# Patient Record
Sex: MALE | Race: WHITE | NOT HISPANIC OR LATINO | Employment: OTHER | URBAN - METROPOLITAN AREA
[De-identification: names, ages, dates, MRNs, and addresses within clinical notes are randomized per-mention and may not be internally consistent; named-entity substitution may affect disease eponyms.]

---

## 2017-01-27 ENCOUNTER — TELEPHONE (OUTPATIENT)
Dept: GASTROENTEROLOGY | Facility: CLINIC | Age: 32
End: 2017-01-27

## 2017-02-07 ENCOUNTER — HOSPITAL ENCOUNTER (OUTPATIENT)
Dept: RADIOLOGY | Facility: HOSPITAL | Age: 32
Discharge: HOME | End: 2017-02-07
Attending: FAMILY MEDICINE
Payer: COMMERCIAL

## 2017-02-07 ENCOUNTER — OFFICE VISIT (OUTPATIENT)
Dept: FAMILY MEDICINE | Facility: CLINIC | Age: 32
End: 2017-02-07
Payer: COMMERCIAL

## 2017-02-07 VITALS
BODY MASS INDEX: 30.52 KG/M2 | SYSTOLIC BLOOD PRESSURE: 118 MMHG | HEART RATE: 112 BPM | TEMPERATURE: 99 F | DIASTOLIC BLOOD PRESSURE: 78 MMHG | HEIGHT: 72 IN | WEIGHT: 225.31 LBS

## 2017-02-07 DIAGNOSIS — Z90.49 STATUS POST CHOLECYSTECTOMY: ICD-10-CM

## 2017-02-07 DIAGNOSIS — N39.0 URINARY TRACT INFECTION WITHOUT HEMATURIA, SITE UNSPECIFIED: ICD-10-CM

## 2017-02-07 DIAGNOSIS — R11.0 NAUSEA: ICD-10-CM

## 2017-02-07 DIAGNOSIS — H66.90 OTITIS MEDIA, UNSPECIFIED CHRONICITY, UNSPECIFIED LATERALITY, UNSPECIFIED OTITIS MEDIA TYPE: ICD-10-CM

## 2017-02-07 DIAGNOSIS — L84 CALLUS: ICD-10-CM

## 2017-02-07 DIAGNOSIS — R07.9 CHEST PAIN, UNSPECIFIED TYPE: ICD-10-CM

## 2017-02-07 DIAGNOSIS — Z83.3 FAMILY HISTORY OF DIABETES MELLITUS: ICD-10-CM

## 2017-02-07 DIAGNOSIS — R50.9 FEVER, UNSPECIFIED FEVER CAUSE: Primary | ICD-10-CM

## 2017-02-07 LAB — GLUCOSE SERPL-MCNC: 99 MG/DL (ref 70–110)

## 2017-02-07 PROCEDURE — 93005 ELECTROCARDIOGRAM TRACING: CPT | Mod: S$GLB,,, | Performed by: FAMILY MEDICINE

## 2017-02-07 PROCEDURE — 99000 SPECIMEN HANDLING OFFICE-LAB: CPT | Mod: S$GLB,,, | Performed by: FAMILY MEDICINE

## 2017-02-07 PROCEDURE — 82948 REAGENT STRIP/BLOOD GLUCOSE: CPT | Mod: S$GLB,,, | Performed by: FAMILY MEDICINE

## 2017-02-07 PROCEDURE — 36416 COLLJ CAPILLARY BLOOD SPEC: CPT | Mod: 59,S$GLB,, | Performed by: FAMILY MEDICINE

## 2017-02-07 PROCEDURE — 96372 THER/PROPH/DIAG INJ SC/IM: CPT | Mod: S$GLB,,, | Performed by: FAMILY MEDICINE

## 2017-02-07 PROCEDURE — 93010 ELECTROCARDIOGRAM REPORT: CPT | Mod: S$GLB,,, | Performed by: INTERNAL MEDICINE

## 2017-02-07 PROCEDURE — 99204 OFFICE O/P NEW MOD 45 MIN: CPT | Mod: 25,S$GLB,, | Performed by: FAMILY MEDICINE

## 2017-02-07 PROCEDURE — 71020 XR CHEST PA AND LATERAL: CPT | Mod: TC,PO

## 2017-02-07 PROCEDURE — 71020 XR CHEST PA AND LATERAL: CPT | Mod: 26,,, | Performed by: RADIOLOGY

## 2017-02-07 PROCEDURE — 99999 PR PBB SHADOW E&M-EST. PATIENT-LVL IV: CPT | Mod: PBBFAC,,, | Performed by: FAMILY MEDICINE

## 2017-02-07 PROCEDURE — 87086 URINE CULTURE/COLONY COUNT: CPT

## 2017-02-07 RX ORDER — CEFTRIAXONE 250 MG/1
250 INJECTION, POWDER, FOR SOLUTION INTRAMUSCULAR; INTRAVENOUS ONCE
Status: COMPLETED | OUTPATIENT
Start: 2017-02-07 | End: 2017-02-07

## 2017-02-07 RX ORDER — CEPHALEXIN 500 MG/1
500 CAPSULE ORAL 4 TIMES DAILY
Qty: 40 CAPSULE | Refills: 0 | Status: SHIPPED | OUTPATIENT
Start: 2017-02-07 | End: 2017-02-11

## 2017-02-07 RX ORDER — ONDANSETRON 8 MG/1
8 TABLET, ORALLY DISINTEGRATING ORAL EVERY 12 HOURS PRN
Qty: 12 TABLET | Refills: 2 | Status: SHIPPED | OUTPATIENT
Start: 2017-02-07 | End: 2017-03-01

## 2017-02-07 RX ORDER — LIDOCAINE HYDROCHLORIDE 10 MG/ML
1 INJECTION, SOLUTION EPIDURAL; INFILTRATION; INTRACAUDAL; PERINEURAL
Status: COMPLETED | OUTPATIENT
Start: 2017-02-07 | End: 2017-02-07

## 2017-02-07 RX ADMIN — LIDOCAINE HYDROCHLORIDE 10 MG: 10 INJECTION, SOLUTION EPIDURAL; INFILTRATION; INTRACAUDAL; PERINEURAL at 09:02

## 2017-02-07 RX ADMIN — CEFTRIAXONE 250 MG: 250 INJECTION, POWDER, FOR SOLUTION INTRAMUSCULAR; INTRAVENOUS at 09:02

## 2017-02-07 NOTE — PROGRESS NOTES
Nguyễn Gale Jr. is a 31 y.o. male     Chief Complaint:  Patient is suffering from fevers sweating ear pain and abdominal pain on and off over the last 3-4 weeks.  Source of history: Patient  Past Medical History   Diagnosis Date    Anxiety     GERD (gastroesophageal reflux disease)     Tobacco use     patient mentioned that he had a cholecystectomy done over a year ago and still had some pain and was supposed to go back to have a duct explored for possible trapped stones.  Patient admits that he never went to have this done and is concerned that maybe some of the pain is feeling now might be related to a trapped stones in a residual bile duct  Patient  reports that he has been smoking Cigarettes.  He has a 5.00 pack-year smoking history. He does not have any smokeless tobacco history on file. He reports that he drinks alcohol. He reports that he uses illicit drugs, including Marijuana, about 7 times per week.  Family History   Problem Relation Age of Onset    No Known Problems Mother     Peripheral vascular disease Father     Diabetes Father     Heart disease Father     Hypertension Father     Hyperlipidemia Father     Cirrhosis Maternal Grandfather     Liver disease Maternal Grandfather     Heart disease Paternal Grandfather     Diabetes Paternal Grandfather     Hypertension Paternal Grandfather     Hyperlipidemia Paternal Grandfather     Colon cancer Paternal Grandfather     Colon polyps Paternal Grandfather     Glaucoma Maternal Grandmother     Amblyopia Neg Hx     Blindness Neg Hx     Cataracts Neg Hx     Macular degeneration Neg Hx     Retinal detachment Neg Hx     Strabismus Neg Hx     Celiac disease Neg Hx     Crohn's disease Neg Hx     Esophageal cancer Neg Hx     Stomach cancer Neg Hx     Ulcerative colitis Neg Hx      ROS:                                                                                GENERAL: No fever, chills, fatigability or weight loss. .  SKIN: No  rashes, itching or changes in color or texture of skin.  HEAD: No headaches or recent head trauma.  EYES: Visual acuity fine. No photophobia, ocular pain or diplopia.  EARS: Denies ear pain, discharge or vertigo.  Patient is been experiencing dizziness on and off over the last few days  NOSE: No loss of smell, no epistaxis or postnasal drip.  MOUTH & THROAT: No hoarseness or change in voice. No excessive gum bleeding.  NODES: Denies swollen glands.  CHEST: Denies GARZA, cyanosis, wheezing, cough and sputum production.  CARDIOVASCULAR: Denies chest pain, PND, orthopnea or reduced exercise tolerance.  ABDOMEN: Appetite fine. No weight loss. Denies diarrhea, abdominal pain, hematemesis or blood in stool.  URINARY: No flank pain, dysuria or hematuria. Frequency of urination has developed over the last few days.  Patient denies any blood or penile discharge.  PERIPHERAL VASCULAR: No claudication or cyanosis.  MUSCULOSKELETAL: No complaints  NEUROLOGIC: No history of seizures, paralysis, alteration of gait or coordination.    OBJECTIVE:  APPEARANCE: Well nourished, well developed, in no acute distress.   VS:  see nurses notes 2/7/2017  SKIN: No lesions, good turgor, no rashes.   HEENT: Right TM erythematous swollen and bulging, left TM slightly pink, ear canals clear bilaterally nasal mucosa injected maxillary sinuses mildly tender percussion throughout mild postnasal drip  NECK: Supple nontender, no carotid bruits, no thyromegaly.  NODES: Normal.  CHEST: Clear bilaterally, with good respiratory excursion, no evidence of respiratory distress.  CARDIOVASCULAR: S1, S2, regular rate and rhythm without murmur.  ABDOMEN: Soft nontender no hepatosplenomegaly, no guarding or rebound, no pulsatile mass.  PERIPHERAL VASCULAR: Distal pulses palpable throughout, normal capillary refill in all distal extremities, no edema.  MUSCULOSKELETAL: No abnormalities noted.  BACK: No scoliosis, no spasm, cervical, thoracic, lumbar spine  nontender to palpation  NEUROLOGIC: Cranial nerves II through XII grossly intact, motor exam 5 out of 5 on distal extremities, no gait disturbances, sensation intact in all distal extremities  MENTAL STATUS: Patient oriented x3, normal affect, normal mood    ASSESSMENT/PLAN:   Nguyễn was seen today for fever, gi problem and constant sweating.    Diagnoses and all orders for this visit:    Fever, unspecified fever cause  -     CBC auto differential; Future  -     TSH; Future  -     Lipid panel; Future  -     Comprehensive metabolic panel; Future  -     Hemoglobin A1c; Future    Chest pain, unspecified type  -     X-Ray Chest PA And Lateral; Future  -     IN OFFICE EKG 12-LEAD (to Muse)    Urinary tract infection without hematuria, site unspecified  -     cephALEXin (KEFLEX) 500 MG capsule; Take 1 capsule (500 mg total) by mouth 4 (four) times daily.  -     cefTRIAXone injection 250 mg; Inject 250 mg into the muscle once.  -     Urine culture  -     lidocaine (PF) 10 mg/ml (1%) injection 10 mg; 1 mL (10 mg total) by Other route one time.    Otitis media, unspecified chronicity, unspecified laterality, unspecified otitis media type  -     cephALEXin (KEFLEX) 500 MG capsule; Take 1 capsule (500 mg total) by mouth 4 (four) times daily.  -     cefTRIAXone injection 250 mg; Inject 250 mg into the muscle once.  -     lidocaine (PF) 10 mg/ml (1%) injection 10 mg; 1 mL (10 mg total) by Other route one time.    Nausea  -     ondansetron (ZOFRAN-ODT) 8 MG TbDL; Take 1 tablet (8 mg total) by mouth every 12 (twelve) hours as needed.    Status post cholecystectomy  -     Ambulatory consult to Gastroenterology    Family history of diabetes mellitus  -     POCT glucose    Callus  -     Ambulatory consult to Podiatry       we'll contact the patient results of additional testing are available we'll make further determinations at that time.

## 2017-02-07 NOTE — PROGRESS NOTES
Ceftriaxone 250mg given to left upper outer quad gluteus, per MD's orders, patient tolerated well, advise patient to wait 15min after shot is given for any adverse reaction.

## 2017-02-07 NOTE — MR AVS SNAPSHOT
[facility]  [address] [facility], [address]  [facility] [address]  Phone: [phone]  Fax: [phone]                  Nguyễn Gale Jr.   2017 8:00 AM   Office Visit    Description:  Male : 1985   Provider:  Rosa Clinton MD   Department:  [facility]           Reason for Visit     Fever     GI Problem     constant sweating           Diagnoses this Visit        Comments    Fever, unspecified fever cause    -  Primary     Chest pain, unspecified type         Urinary tract infection without hematuria, site unspecified         Otitis media, unspecified chronicity, unspecified laterality, unspecified otitis media type         Nausea         Status post cholecystectomy         Family history of diabetes mellitus                To Do List           Goals (5 Years of Data)     None       These Medications        Disp Refills Start End    cephALEXin (KEFLEX) 500 MG capsule 40 capsule 0 2017    Take 1 capsule (500 mg total) by mouth 4 (four) times daily. - Oral    Pharmacy: [facility] [address] [facility] Ph #: [phone]       ondansetron (ZOFRAN-ODT) 8 MG TbDL 12 tablet 2 2017     Take 1 tablet (8 mg total) by mouth every 12 (twelve) hours as needed. - Oral    Pharmacy: [facility] [address] AT [facility] Ph #: [phone]         [facility] On Call     [facility] On [facility] Nurse Care Line -  Assistance  Registered nurses in the [facility] Center provide clinical advisement, health education, appointment booking, and other advisory services.  Call for this free service at [phone].             Medications           Message regarding Medications     Verify the changes and/or additions to your medication regime listed below are the same as discussed with your  clinician today.  If any of these changes or additions are incorrect, please notify your healthcare provider.        START taking these NEW medications        Refills    cephALEXin (KEFLEX) 500 MG capsule 0    Sig: Take 1 capsule (500 mg total) by mouth 4 (four) times daily.    Class: Normal    Route: Oral    ondansetron (ZOFRAN-ODT) 8 MG TbDL 2    Sig: Take 1 tablet (8 mg total) by mouth every 12 (twelve) hours as needed.    Class: Normal    Route: Oral      These medications were administered today        Dose Freq    cefTRIAXone injection 250 mg 250 mg Once    Sig: Inject 250 mg into the muscle once.    Class: Normal    Route: Intramuscular           Verify that the below list of medications is an accurate representation of the medications you are currently taking.  If none reported, the list may be blank. If incorrect, please contact your healthcare provider. Carry this list with you in case of emergency.           Current Medications     bacitracin 500 unit/gram Oint Apply topically 2 (two) times daily.    levocetirizine (XYZAL) 5 MG tablet Take 1 tablet (5 mg total) by mouth every evening.    cephALEXin (KEFLEX) 500 MG capsule Take 1 capsule (500 mg total) by mouth 4 (four) times daily.    ondansetron (ZOFRAN-ODT) 8 MG TbDL Take 1 tablet (8 mg total) by mouth every 12 (twelve) hours as needed.           Clinical Reference Information           Your Vitals Were     BP Pulse Temp Height Weight BMI    118/78 (BP Location: Left arm) 112 99.3 °F (37.4 °C) 6' (1.829 m) 102.2 kg (225 lb 5 oz) 30.56 kg/m2      Blood Pressure          Most Recent Value    BP  118/78      Allergies as of 2/7/2017     No Known Allergies      Immunizations Administered on Date of Encounter - 2/7/2017     None      Orders Placed During Today's Visit      Normal Orders This Visit    Ambulatory consult to Gastroenterology     IN OFFICE EKG 12-LEAD (to Muse)     POCT glucose     Urine culture     Future Labs/Procedures Expected by Expires     CBC auto differential  2/7/2017 4/8/2018    Comprehensive metabolic panel  2/7/2017 4/8/2018    Hemoglobin A1c  2/7/2017 4/8/2018    Lipid panel  2/7/2017 4/8/2018    TSH  2/7/2017 4/8/2018    X-Ray Chest PA And Lateral  2/7/2017 2/7/2018 2/7/2017  9:03 AM - Norma Sen LPN      Component Results     Component Value Flag Ref Range Units Status    POC Glucose 99  70 - 110 mg/dL Final            [facility] Sign-Up     Activating your [facility] account is as easy as 1-2-3!     1) Visit [email or web address], select Sign Up Now, enter this activation code and your date of birth, then select Next.  [email or web address]  Expires: 2/13/2017  4:05 PM      2) Create a username and password to use when you visit [facility] in the future and select a security question in case you lose your password and select Next.    3) Enter your e-mail address and click Sign Up!    Additional Information  If you have questions, please e-mail [email or web address] or call [phone] to talk to our [facility] staff. Remember, [facility] is NOT to be used for urgent needs. For medical emergencies, dial 911.         Smoking Cessation     If you would like to quit smoking:   You may be eligible for free services if you are a [place] resident and started smoking cigarettes before September 1, 1988.  Call the Smoking Cessation Trust ([facility]) toll free at (513) 784-3390 or (647) 883-6262.   Call 5-190-QUIT-NOW if you do not meet the above criteria.            Language Assistance Services     ATTENTION: Language assistance services are available, free of charge. Please call [phone].      ATENCIÓN: Si habla español, tiene a samson disposición servicios gratuitos de asistencia lingüística. Llame al [phone].     CHÚ Ý: N?u b?n nói Ti?ng Vi?t, có các d?ch v? h? tr? ngôn ng? mi?n phí dành cho b?n. G?i s? [phone].         [facility] complies with applicable Federal civil rights laws and does not discriminate on the  basis of race, color, national origin, age, disability, or sex.

## 2017-02-08 LAB — BACTERIA UR CULT: NO GROWTH

## 2017-02-09 ENCOUNTER — OFFICE VISIT (OUTPATIENT)
Dept: GASTROENTEROLOGY | Facility: CLINIC | Age: 32
End: 2017-02-09
Payer: COMMERCIAL

## 2017-02-09 VITALS
WEIGHT: 226.44 LBS | HEIGHT: 72 IN | HEART RATE: 111 BPM | DIASTOLIC BLOOD PRESSURE: 77 MMHG | BODY MASS INDEX: 30.67 KG/M2 | SYSTOLIC BLOOD PRESSURE: 131 MMHG

## 2017-02-09 DIAGNOSIS — R10.9 ABDOMINAL PAIN, UNSPECIFIED LOCATION: ICD-10-CM

## 2017-02-09 DIAGNOSIS — R79.89 LFT ELEVATION: Primary | ICD-10-CM

## 2017-02-09 PROCEDURE — 99214 OFFICE O/P EST MOD 30 MIN: CPT | Mod: S$GLB,,, | Performed by: INTERNAL MEDICINE

## 2017-02-09 PROCEDURE — 99999 PR PBB SHADOW E&M-EST. PATIENT-LVL III: CPT | Mod: PBBFAC,,, | Performed by: INTERNAL MEDICINE

## 2017-02-09 PROCEDURE — 1160F RVW MEDS BY RX/DR IN RCRD: CPT | Mod: S$GLB,,, | Performed by: INTERNAL MEDICINE

## 2017-02-09 RX ORDER — DICYCLOMINE HYDROCHLORIDE 20 MG/1
20 TABLET ORAL EVERY 6 HOURS
COMMUNITY
End: 2017-07-06 | Stop reason: ALTCHOICE

## 2017-02-09 NOTE — LETTER
February 13, 2017      Rosa Clinton MD  [address] [facility]  [address]  [facility] [address]           [facility]  [address] [facility]  [address]  [facility] [address]  Phone: [phone]          Patient: Nguyễn Gale Jr.   MR Number: [id number]   YOB: 1985   Date of Visit: 2/9/2017       Dear Dr. Rosa Clinton:    Thank you for referring Nguyễn Gale to me for evaluation. Attached you will find relevant portions of my assessment and plan of care.    If you have questions, please do not hesitate to call me. I look forward to following Nguyễn Gale along with you.    Sincerely,    Drake Sanderson MD    Enclosure  CC:  No Recipients    If you would like to receive this communication electronically, please contact [email or web address] or [phone] to request more information on [employer] access.    For providers and/or their staff who would like to refer a patient to [facility], please contact us through our one-stop-shop provider referral line, [facility], [place], at [phone].    If you feel you have received this communication in error or would no longer like to receive these types of communications, please e-mail [email or web address]

## 2017-02-09 NOTE — PROGRESS NOTES
Subjective:       Patient ID: Nguyễn Gale Jr. is a 31 y.o. male.    Chief Complaint: Abdominal Pain and Gastroesophageal Reflux    This is a 32yo male here for evaluation of abdominal pain.      Abdominal Pain   This is a recurrent problem. The current episode started more than 1 month ago. The onset quality is sudden. The problem occurs intermittently. The problem has been unchanged. The pain is located in the RUQ (feels similar to pain with prior cholelithiasis). The pain is at a severity of 5/10. The pain is moderate. The quality of the pain is colicky. The abdominal pain does not radiate. Pertinent negatives include no constipation, diarrhea, fever, hematuria, nausea or vomiting. Nothing aggravates the pain. The pain is relieved by nothing. He has tried nothing for the symptoms. The treatment provided no relief. Prior diagnostic workup includes GI consult. His past medical history is significant for abdominal surgery. There is no history of PUD.     Review of Systems   Constitutional: Negative for chills and fever.   HENT: Negative for postnasal drip and trouble swallowing.    Eyes: Negative for pain and visual disturbance.   Respiratory: Negative for cough, choking and shortness of breath.    Cardiovascular: Negative for chest pain and leg swelling.   Gastrointestinal: Negative for abdominal distention, abdominal pain, anal bleeding, blood in stool, constipation, diarrhea, nausea, rectal pain and vomiting.   Endocrine: Negative for cold intolerance and heat intolerance.   Genitourinary: Negative for difficulty urinating and hematuria.   Neurological: Negative for dizziness and numbness.   Hematological: Negative for adenopathy. Does not bruise/bleed easily.   Psychiatric/Behavioral: Negative for agitation and confusion.       Objective:      Physical Exam   Constitutional: He is oriented to person, place, and time. He appears well-developed and well-nourished. No distress.   HENT:   Head: Normocephalic.    Eyes: Conjunctivae are normal. No scleral icterus.   Neck: No tracheal deviation present. No thyromegaly present.   Cardiovascular: Normal rate, regular rhythm and normal heart sounds.  Exam reveals no gallop and no friction rub.    No murmur heard.  Pulmonary/Chest: Effort normal and breath sounds normal. He has no wheezes. He has no rales.   Abdominal: Soft. Bowel sounds are normal. He exhibits no distension. There is no tenderness. There is no rebound and no guarding.   Musculoskeletal: Normal range of motion. He exhibits no edema or tenderness.   Neurological: He is alert and oriented to person, place, and time.   Skin: He is not diaphoretic.   Psychiatric: He has a normal mood and affect. His behavior is normal.       Labs:   Assessment:       1. LFT elevation    2. Abdominal pain, unspecified location        Plan:   1. MRCP  2. Continue Bentyl

## 2017-02-11 ENCOUNTER — OFFICE VISIT (OUTPATIENT)
Dept: INTERNAL MEDICINE | Facility: CLINIC | Age: 32
End: 2017-02-11
Payer: COMMERCIAL

## 2017-02-11 VITALS
WEIGHT: 226.88 LBS | DIASTOLIC BLOOD PRESSURE: 83 MMHG | HEIGHT: 72 IN | TEMPERATURE: 98 F | SYSTOLIC BLOOD PRESSURE: 151 MMHG | BODY MASS INDEX: 30.73 KG/M2

## 2017-02-11 DIAGNOSIS — L03.315 CELLULITIS OF PERINEUM: Primary | ICD-10-CM

## 2017-02-11 PROCEDURE — 99214 OFFICE O/P EST MOD 30 MIN: CPT | Mod: S$GLB,,, | Performed by: FAMILY MEDICINE

## 2017-02-11 PROCEDURE — 99999 PR PBB SHADOW E&M-EST. PATIENT-LVL III: CPT | Mod: PBBFAC,,, | Performed by: FAMILY MEDICINE

## 2017-02-11 RX ORDER — METHYLPREDNISOLONE 4 MG/1
TABLET ORAL
Qty: 30 TABLET | Refills: 0 | Status: SHIPPED | OUTPATIENT
Start: 2017-02-11 | End: 2017-03-01 | Stop reason: ALTCHOICE

## 2017-02-11 RX ORDER — SULFAMETHOXAZOLE AND TRIMETHOPRIM 800; 160 MG/1; MG/1
1 TABLET ORAL 2 TIMES DAILY
Qty: 20 TABLET | Refills: 0 | Status: SHIPPED | OUTPATIENT
Start: 2017-02-11 | End: 2017-02-16 | Stop reason: ALTCHOICE

## 2017-02-11 NOTE — PROGRESS NOTES
Subjective:       Patient ID: Nguyễn Gale Jr. is a 31 y.o. male.    Chief Complaint: Penis Pain (no pain red and swollen with rash)    HPI Comments: Disclaimer: This note has been generated using voice-recognition software. There may be typographical errors that have been missed during proof-reading    Vision is 31-year-old who was previously seen for a possible UTI and ear infection, and is presently taking Keflex 500 mg 3 times a day.  He developed a pruritic rash several days ago, and states that his girlfriend has a similar rash, which is previously treated as scabies.  He had noted the lesions over the genital area, and over the last 24-48 hours, noted the onset of swelling, redness along with pain and itching over the genital area.  No penile discharge.  No vesicular lesions noted by the patient.    Penis Pain   The patient's primary symptoms include penile pain and scrotal swelling. The patient's pertinent negatives include no testicular pain. Associated symptoms include a rash.     Review of Systems   Genitourinary: Positive for penile pain, penile swelling and scrotal swelling. Negative for decreased urine volume, difficulty urinating and testicular pain.   Skin: Positive for color change and rash.       Objective:      Physical Exam   Constitutional: He appears well-developed and well-nourished. No distress.   HENT:   Right Ear: Tympanic membrane and ear canal normal.   Left Ear: Tympanic membrane and ear canal normal.   Mouth/Throat: Uvula is midline, oropharynx is clear and moist and mucous membranes are normal. No posterior oropharyngeal erythema.   Genitourinary:   Genitourinary Comments: Confluent, erythematous rash involving penis and scrotal area, no vesicular lesions   Skin:   Several lesions, slightly raised, erythematous, in linear fashion over upper, lower body       Assessment:       1. Cellulitis of perineum        Plan:       1.  Stop Keflex, start Bactrim  2.  Medrol dosepak  3.  F/u  2-3 days

## 2017-02-11 NOTE — MR AVS SNAPSHOT
[facility]   [facility]  [place] [address]  Phone: [phone]  Fax: [phone]                  Nguyễn Gale Jr.   2017 8:20 AM   Office Visit    Description:  Male : 1985   Provider:  Natan Murphy MD   Department:  [facility]           Reason for Visit     Penis Pain           Diagnoses this Visit        Comments    Cellulitis of perineum    -  Primary            To Do List           Future Appointments        Provider Department Dept Phone    2017 8:45 AM Adamaris Cohen DPM [facility] [phone]    2017 9:45 AM [facility] [phone]      Goals (5 Years of Data)     None       These Medications        Disp Refills Start End    sulfamethoxazole-trimethoprim 800-160mg (BACTRIM DS) 800-160 mg Tab 20 tablet 0 2017    Take 1 tablet by mouth 2 (two) times daily. - Oral    [facility] [address] [facility] [address] Ph #: [phone]       methylPREDNISolone (MEDROL DOSEPACK) 4 mg tablet 30 tablet 0 2017     Take as directed    [facility] [address] [facility] [address] Ph #: [phone]         [facility]     [facility] On [facility] Nurse Care Line -  Assistance  Registered nurses in the [facility] Center provide clinical advisement, health education, appointment booking, and other advisory services.  Call for this free service at [phone].             Medications           Message regarding Medications     Verify the changes and/or additions to your medication regime listed below are the same as discussed with your clinician today.  If any of these changes or additions are incorrect, please notify your healthcare provider.        START taking these NEW medications        Refills     sulfamethoxazole-trimethoprim 800-160mg (BACTRIM DS) 800-160 mg Tab 0    Sig: Take 1 tablet by mouth 2 (two) times daily.    Class: Normal    Route: Oral    methylPREDNISolone (MEDROL DOSEPACK) 4 mg tablet 0    Sig: Take as directed    Class: Normal      STOP taking these medications     cephALEXin (KEFLEX) 500 MG capsule Take 1 capsule (500 mg total) by mouth 4 (four) times daily.           Verify that the below list of medications is an accurate representation of the medications you are currently taking.  If none reported, the list may be blank. If incorrect, please contact your healthcare provider. Carry this list with you in case of emergency.           Current Medications     dicyclomine (BENTYL) 20 mg tablet Take 20 mg by mouth every 6 (six) hours.    ondansetron (ZOFRAN-ODT) 8 MG TbDL Take 1 tablet (8 mg total) by mouth every 12 (twelve) hours as needed.    methylPREDNISolone (MEDROL DOSEPACK) 4 mg tablet Take as directed    sulfamethoxazole-trimethoprim 800-160mg (BACTRIM DS) 800-160 mg Tab Take 1 tablet by mouth 2 (two) times daily.           Clinical Reference Information           Your Vitals Were     BP Temp Height Weight BMI    151/83 (BP Location: Right arm, Patient Position: Sitting, BP Method: Automatic) 98.3 °F (36.8 °C) (Oral) 6' (1.829 m) 102.9 kg (226 lb 13.7 oz) 30.77 kg/m2      Blood Pressure          Most Recent Value    BP  (!)  151/83      Allergies as of 2/11/2017     No Known Allergies      Immunizations Administered on Date of Encounter - 2/11/2017     None      [facility] Sign-Up     Activating your [facility] account is as easy as 1-2-3!     1) Visit [email or web address], select Sign Up Now, enter this activation code and your date of birth, then select Next.  [email or web address]  Expires: 2/13/2017  4:05 PM      2) Create a username and password to use when you visit [facility] in the future and select a security question in case you lose your password and select Next.    3) Enter your e-mail  address and click Sign Up!    Additional Information  If you have questions, please e-mail [email or web address] or call [phone] to talk to our [facility] staff. Remember, [facility] is NOT to be used for urgent needs. For medical emergencies, dial 911.         Instructions    Call in 2-3 days       Smoking Cessation     If you would like to quit smoking:   You may be eligible for free services if you are a [place] resident and started smoking cigarettes before September 1, 1988.  Call the Smoking Cessation Trust ([employer]) toll free at (206) 097-8822 or (589) 827-2926.   Call 6-688-QUIT-NOW if you do not meet the above criteria.            Language Assistance Services     ATTENTION: Language assistance services are available, free of charge. Please call [phone].      ATENCIÓN: Si josefina stephens, tiene a samson disposición servicios gratuitos de asistencia lingüística. Llame al [phone].     CHÚ Ý: N?u b?n nói Ti?ng Vi?t, có các d?ch v? h? tr? ngôn ng? mi?n phí dành cho b?n. G?i s? [phone].         [facility] complies with applicable Federal civil rights laws and does not discriminate on the basis of race, color, national origin, age, disability, or sex.

## 2017-02-16 ENCOUNTER — OFFICE VISIT (OUTPATIENT)
Dept: PODIATRY | Facility: CLINIC | Age: 32
End: 2017-02-16
Payer: COMMERCIAL

## 2017-02-16 VITALS
HEART RATE: 96 BPM | HEIGHT: 72 IN | SYSTOLIC BLOOD PRESSURE: 135 MMHG | BODY MASS INDEX: 30.07 KG/M2 | WEIGHT: 222 LBS | RESPIRATION RATE: 18 BRPM | DIASTOLIC BLOOD PRESSURE: 82 MMHG

## 2017-02-16 DIAGNOSIS — B07.0 PLANTAR WART: Primary | ICD-10-CM

## 2017-02-16 DIAGNOSIS — L85.1 PLANTAR POROKERATOSIS, ACQUIRED: ICD-10-CM

## 2017-02-16 PROCEDURE — 99999 PR PBB SHADOW E&M-EST. PATIENT-LVL III: CPT | Mod: PBBFAC,,, | Performed by: PODIATRIST

## 2017-02-16 PROCEDURE — 99204 OFFICE O/P NEW MOD 45 MIN: CPT | Mod: S$GLB,,, | Performed by: PODIATRIST

## 2017-02-16 NOTE — MR AVS SNAPSHOT
[facility]   [facility]  [place] [address]  Phone: [phone]                  Nguyễn Gale Jr.   2017 8:45 AM   Office Visit    Description:  Male : 1985   Provider:  Adamaris Cohen DPM   Department:  [facility]           Reason for Visit     PCP     Foot Problem     Callouses           Diagnoses this Visit        Comments    Plantar wart    -  Primary     Plantar porokeratosis, acquired                To Do List           Future Appointments        Provider Department Dept Phone    2017 9:45 AM [facility] [phone]    3/2/2017 8:45 AM Adamaris Cohen DPM [facility] [phone]      Goals (5 Years of Data)     None       These Medications        Disp Refills Start End    salicylic acid-lactic acid 17 % external solution 14 mL 1 2017     Apply topically once daily. Apply to the left heel daily, occlude with a small piece of duct tape.  Change daily. - Topical (Top)    Pharmacy: [facility] [id number]  [address] AT [facility] #: [phone]         [facility] On Call     [facility] On [facility] Nurse Care Line -  Assistance  Registered nurses in the [facility] On Call Center provide clinical advisement, health education, appointment booking, and other advisory services.  Call for this free service at [phone].             Medications           Message regarding Medications     Verify the changes and/or additions to your medication regime listed below are the same as discussed with your clinician today.  If any of these changes or additions are incorrect, please notify your healthcare provider.        START taking these NEW medications        Refills    salicylic acid-lactic acid 17 % external solution 1    Sig: Apply topically once daily. Apply to the left heel daily, occlude with a small piece of duct tape.  Change daily.    Class:  Normal    Route: Topical (Top)      STOP taking these medications     sulfamethoxazole-trimethoprim 800-160mg (BACTRIM DS) 800-160 mg Tab Take 1 tablet by mouth 2 (two) times daily.           Verify that the below list of medications is an accurate representation of the medications you are currently taking.  If none reported, the list may be blank. If incorrect, please contact your healthcare provider. Carry this list with you in case of emergency.           Current Medications     dicyclomine (BENTYL) 20 mg tablet Take 20 mg by mouth every 6 (six) hours.    methylPREDNISolone (MEDROL DOSEPACK) 4 mg tablet Take as directed    ondansetron (ZOFRAN-ODT) 8 MG TbDL Take 1 tablet (8 mg total) by mouth every 12 (twelve) hours as needed.    salicylic acid-lactic acid 17 % external solution Apply topically once daily. Apply to the left heel daily, occlude with a small piece of duct tape.  Change daily.           Clinical Reference Information           Your Vitals Were     BP Pulse Resp Height Weight BMI    135/82 96 18 6' (1.829 m) 100.7 kg (222 lb) 30.11 kg/m2      Blood Pressure          Most Recent Value    BP  135/82      Allergies as of 2/16/2017     No Known Allergies      Immunizations Administered on Date of Encounter - 2/16/2017     None      [facility] Sign-Up     Activating your [facility] account is as easy as 1-2-3!     1) Visit [email or web address], select Sign Up Now, enter this activation code and your date of birth, then select Next.  [email or web address]  Expires: 4/2/2017  9:20 AM      2) Create a username and password to use when you visit [facility] in the future and select a security question in case you lose your password and select Next.    3) Enter your e-mail address and click Sign Up!    Additional Information  If you have questions, please e-mail [email or web address] or call [phone] to talk to our [facility] staff. Remember, [facility] is NOT to be used for urgent needs. For medical emergencies, dial  601.         Smoking Cessation     If you would like to quit smoking:   You may be eligible for free services if you are a [place] resident and started smoking cigarettes before September 1, 1988.  Call the Smoking Cessation Trust (SCT) toll free at (200) 066-9515 or (071) 294-4678.   Call 1-800-QUIT-NOW if you do not meet the above criteria.            Language Assistance Services     ATTENTION: Language assistance services are available, free of charge. Please call [phone].      ATENCIÓN: Si habla angelo, tiene a samson disposición servicios gratuitos de asistencia lingüística. Llame al [phone].     CHÚ Ý: N?u b?n nói Ti?ng Vi?t, có các d?ch v? h? tr? ngôn ng? mi?n phí dành cho b?n. G?i s? [phone].         [facility] complies with applicable Federal civil rights laws and does not discriminate on the basis of race, color, national origin, age, disability, or sex.

## 2017-02-16 NOTE — PROGRESS NOTES
CC:   Painful Left heel      HPI:   Nguyễn Gale Jr. is a 31 y.o. male with complaints of  left heel pain, which appears to be present for about 15 years.   Patient reports denies injury to the area.  Patient states the pain occurs when weight bearin.    Treatment tried at home: none yet.  He has not seen a doctor for this pain yet.          Past Medical History   Diagnosis Date    Anxiety     GERD (gastroesophageal reflux disease)     Tobacco use            Review of patient's allergies indicates:  No Known Allergies          ROS:   General ROS: negative  Respiratory ROS: no cough, shortness of breath, or wheezing  Cardiovascular ROS: no chest pain or dyspnea on exertion  Musculoskeletal ROS: negative  Neurological ROS: no TIA or stroke symptoms  Dermatological ROS: negative      EXAM:     Vitals:    02/16/17 0903   BP: 135/82   Pulse: 96   Resp: 18   Weight: 100.7 kg (222 lb)   Height: 6' (1.829 m)        General: Patient is WD/WN, alert and oriented x 3 and in no apparent distress.     Left  Lower extremity exam:  Vascular: Dorsalis pedis and posterior tibial pulses are 2/4 .  3 seconds capillary refill time and toes are warm to touch.   There is no edema.   Neurological:  Light touch, proprioception, and sharp/dull sensation are all intact.   No focal deficits  Dermatological:      left heel: nucleated hyperkeratosis, flat verrucous-appearing but no pinpoint bleeding noted.  +tenderness to direct palpation.  approx 2mm x 2mm.   No redness, bruising or swelling.   Musculoskeletal:  Muscle strength is 5/5 in all groups .  Metatarsophalangeal, subtalar, and ankle range of motion are within normal limits without crepitus.         ASSESSMENT:  1. Plantar wart  salicylic acid-lactic acid 17 % external solution   2. Plantar porokeratosis, acquired  salicylic acid-lactic acid 17 % external solution          PLAN:  Patient education/counseling.   Salicylic acid to be applied daily by patient at home, occlude with  duct tape and change daily.  Return in about 2 weeks (around 3/2/2017) for follow up heel pain, or sooner if concerned. may consider surgical excision if no improvement.   Patient is amenable to plan.

## 2017-02-20 ENCOUNTER — LAB VISIT (OUTPATIENT)
Dept: LAB | Facility: HOSPITAL | Age: 32
End: 2017-02-20
Attending: FAMILY MEDICINE
Payer: COMMERCIAL

## 2017-02-20 ENCOUNTER — OFFICE VISIT (OUTPATIENT)
Dept: FAMILY MEDICINE | Facility: CLINIC | Age: 32
End: 2017-02-20
Payer: COMMERCIAL

## 2017-02-20 VITALS
SYSTOLIC BLOOD PRESSURE: 112 MMHG | DIASTOLIC BLOOD PRESSURE: 70 MMHG | BODY MASS INDEX: 29.38 KG/M2 | HEART RATE: 120 BPM | TEMPERATURE: 99 F | WEIGHT: 216.94 LBS | HEIGHT: 72 IN

## 2017-02-20 DIAGNOSIS — R94.5 ABNORMAL LIVER FUNCTION: ICD-10-CM

## 2017-02-20 DIAGNOSIS — H66.90 OTITIS, UNSPECIFIED LATERALITY: ICD-10-CM

## 2017-02-20 DIAGNOSIS — E87.1 HYPONATREMIA: ICD-10-CM

## 2017-02-20 DIAGNOSIS — R21 RASH: ICD-10-CM

## 2017-02-20 DIAGNOSIS — R50.9 FEVER, UNSPECIFIED FEVER CAUSE: ICD-10-CM

## 2017-02-20 DIAGNOSIS — R79.89 ABNORMAL LIVER FUNCTION TESTS: ICD-10-CM

## 2017-02-20 DIAGNOSIS — H66.90 OTITIS MEDIA, UNSPECIFIED CHRONICITY, UNSPECIFIED LATERALITY, UNSPECIFIED OTITIS MEDIA TYPE: ICD-10-CM

## 2017-02-20 DIAGNOSIS — R21 RASH AND NONSPECIFIC SKIN ERUPTION: Primary | ICD-10-CM

## 2017-02-20 LAB
ALBUMIN SERPL BCP-MCNC: 3.8 G/DL
ALBUMIN SERPL BCP-MCNC: 3.8 G/DL
ALP SERPL-CCNC: 109 U/L
ALP SERPL-CCNC: 109 U/L
ALT SERPL W/O P-5'-P-CCNC: 75 U/L
ALT SERPL W/O P-5'-P-CCNC: 75 U/L
ANION GAP SERPL CALC-SCNC: 9 MMOL/L
ANION GAP SERPL CALC-SCNC: 9 MMOL/L
AST SERPL-CCNC: 45 U/L
AST SERPL-CCNC: 45 U/L
BASOPHILS # BLD AUTO: 0.11 K/UL
BASOPHILS NFR BLD: 1.2 %
BILIRUB SERPL-MCNC: 0.8 MG/DL
BILIRUB SERPL-MCNC: 0.8 MG/DL
BUN SERPL-MCNC: 11 MG/DL
BUN SERPL-MCNC: 11 MG/DL
CALCIUM SERPL-MCNC: 9.3 MG/DL
CALCIUM SERPL-MCNC: 9.3 MG/DL
CHLORIDE SERPL-SCNC: 102 MMOL/L
CHLORIDE SERPL-SCNC: 102 MMOL/L
CO2 SERPL-SCNC: 24 MMOL/L
CO2 SERPL-SCNC: 24 MMOL/L
CREAT SERPL-MCNC: 1 MG/DL
CREAT SERPL-MCNC: 1 MG/DL
DIFFERENTIAL METHOD: ABNORMAL
EOSINOPHIL # BLD AUTO: 0 K/UL
EOSINOPHIL NFR BLD: 0.4 %
ERYTHROCYTE [DISTWIDTH] IN BLOOD BY AUTOMATED COUNT: 13.3 %
EST. GFR  (AFRICAN AMERICAN): >60 ML/MIN/1.73 M^2
EST. GFR  (AFRICAN AMERICAN): >60 ML/MIN/1.73 M^2
EST. GFR  (NON AFRICAN AMERICAN): >60 ML/MIN/1.73 M^2
EST. GFR  (NON AFRICAN AMERICAN): >60 ML/MIN/1.73 M^2
GLUCOSE SERPL-MCNC: 97 MG/DL
GLUCOSE SERPL-MCNC: 97 MG/DL
HCT VFR BLD AUTO: 47.6 %
HGB BLD-MCNC: 16 G/DL
LYMPHOCYTES # BLD AUTO: 3.6 K/UL
LYMPHOCYTES NFR BLD: 37.9 %
MCH RBC QN AUTO: 31.8 PG
MCHC RBC AUTO-ENTMCNC: 33.6 %
MCV RBC AUTO: 95 FL
MONOCYTES # BLD AUTO: 1.1 K/UL
MONOCYTES NFR BLD: 12 %
NEUTROPHILS # BLD AUTO: 4.5 K/UL
NEUTROPHILS NFR BLD: 48.5 %
PLATELET # BLD AUTO: 295 K/UL
PMV BLD AUTO: 10.7 FL
POTASSIUM SERPL-SCNC: 4.7 MMOL/L
POTASSIUM SERPL-SCNC: 4.7 MMOL/L
PROT SERPL-MCNC: 8.2 G/DL
PROT SERPL-MCNC: 8.2 G/DL
RBC # BLD AUTO: 5.03 M/UL
SODIUM SERPL-SCNC: 135 MMOL/L
SODIUM SERPL-SCNC: 135 MMOL/L
WBC # BLD AUTO: 9.42 K/UL

## 2017-02-20 PROCEDURE — 99214 OFFICE O/P EST MOD 30 MIN: CPT | Mod: 25,S$GLB,, | Performed by: FAMILY MEDICINE

## 2017-02-20 PROCEDURE — 85025 COMPLETE CBC W/AUTO DIFF WBC: CPT

## 2017-02-20 PROCEDURE — 96372 THER/PROPH/DIAG INJ SC/IM: CPT | Mod: S$GLB,,, | Performed by: FAMILY MEDICINE

## 2017-02-20 PROCEDURE — 99999 PR PBB SHADOW E&M-EST. PATIENT-LVL III: CPT | Mod: PBBFAC,,, | Performed by: FAMILY MEDICINE

## 2017-02-20 PROCEDURE — 80053 COMPREHEN METABOLIC PANEL: CPT

## 2017-02-20 PROCEDURE — 36415 COLL VENOUS BLD VENIPUNCTURE: CPT | Mod: PO

## 2017-02-20 RX ORDER — TRIAMCINOLONE ACETONIDE 40 MG/ML
40 INJECTION, SUSPENSION INTRA-ARTICULAR; INTRAMUSCULAR
Status: COMPLETED | OUTPATIENT
Start: 2017-02-20 | End: 2017-02-20

## 2017-02-20 RX ORDER — CEFTRIAXONE 250 MG/1
250 INJECTION, POWDER, FOR SOLUTION INTRAMUSCULAR; INTRAVENOUS
Status: COMPLETED | OUTPATIENT
Start: 2017-02-20 | End: 2017-02-20

## 2017-02-20 RX ADMIN — CEFTRIAXONE 250 MG: 250 INJECTION, POWDER, FOR SOLUTION INTRAMUSCULAR; INTRAVENOUS at 04:02

## 2017-02-20 RX ADMIN — LIDOCAINE HYDROCHLORIDE 10 MG: 10 INJECTION, SOLUTION EPIDURAL; INFILTRATION; INTRACAUDAL; PERINEURAL at 04:02

## 2017-02-20 RX ADMIN — TRIAMCINOLONE ACETONIDE 40 MG: 40 INJECTION, SUSPENSION INTRA-ARTICULAR; INTRAMUSCULAR at 04:02

## 2017-02-20 NOTE — PROGRESS NOTES
Ceftriaxone 250mg and Kenalog 40mg given IM per MD's orders, patient tolerated well. advise patient to wait 15min after shot is given for any adverse reaction.

## 2017-02-20 NOTE — MR AVS SNAPSHOT
[facility]  [address] [facility], [address]  [place] [address]  Phone: [phone]  Fax: [phone]                  Nguyễn Gale Jr.   2017 4:00 PM   Office Visit    Description:  Male : 1985   Provider:  Rosa Clinton MD   Department:  [facility]           Reason for Visit     Rash           Diagnoses this Visit        Comments    Rash and nonspecific skin eruption    -  Primary     Abnormal liver function         Otitis, unspecified laterality                To Do List           Future Appointments        Provider Department Dept Phone    3/2/2017 8:45 AM Adamaris Cohen DPM [place] [facility] [phone]      Goals (5 Years of Data)     None      PURCHASE these Medications (No prescription required)        Start End    colloidal oatmeal 1 % Lotn 2017     Sig - Route: Apply 1 application topically 2 (two) times daily as needed (aveeno). - Topical (Top)    Class: OTC      [facility] On [facility]     [facility] On Call Nurse Care Line -  Assistance  Registered nurses in the [facility] Center provide clinical advisement, health education, appointment booking, and other advisory services.  Call for this free service at [phone].             Medications           Message regarding Medications     Verify the changes and/or additions to your medication regime listed below are the same as discussed with your clinician today.  If any of these changes or additions are incorrect, please notify your healthcare provider.        START taking these NEW medications        Refills    colloidal oatmeal 1 % Lotn 6    Sig: Apply 1 application topically 2 (two) times daily as needed (aveeno).    Class: OTC    Route: Topical (Top)      These medications were administered today        Dose Freq    triamcinolone acetonide injection 40 mg 40 mg Clinic/HOD 1 time    Sig: Inject 1 mL (40 mg total) into the muscle one time.    Class: Normal    Route:  Intramuscular    cefTRIAXone injection 250 mg 250 mg Clinic/HOD 1 time    Sig: Inject 250 mg into the muscle one time.    Class: Normal    Route: Intramuscular           Verify that the below list of medications is an accurate representation of the medications you are currently taking.  If none reported, the list may be blank. If incorrect, please contact your healthcare provider. Carry this list with you in case of emergency.           Current Medications     colloidal oatmeal 1 % Lotn Apply 1 application topically 2 (two) times daily as needed (aveeno).    dicyclomine (BENTYL) 20 mg tablet Take 20 mg by mouth every 6 (six) hours.    methylPREDNISolone (MEDROL DOSEPACK) 4 mg tablet Take as directed    ondansetron (ZOFRAN-ODT) 8 MG TbDL Take 1 tablet (8 mg total) by mouth every 12 (twelve) hours as needed.    salicylic acid-lactic acid 17 % external solution Apply topically once daily. Apply to the left heel daily, occlude with a small piece of duct tape.  Change daily.           Clinical Reference Information           Your Vitals Were     BP Pulse Temp Height Weight BMI    112/70 (BP Location: Right arm) 120 99.1 °F (37.3 °C) 6' (1.829 m) 98.4 kg (216 lb 14.9 oz) 29.42 kg/m2      Blood Pressure          Most Recent Value    BP  112/70      Allergies as of 2/20/2017     Plantain (Abdiaziz)      Immunizations Administered on Date of Encounter - 2/20/2017     None      Orders Placed During Today's Visit     Future Labs/Procedures Expected by Expires    Comprehensive metabolic panel  2/20/2017 4/21/2018      Smoking Cessation     If you would like to quit smoking:   You may be eligible for free services if you are a [place] resident and started smoking cigarettes before September 1, 1988.  Call the Smoking Cessation Trust (SCT) toll free at (222) 054-6462 or (799) 154-7060.   Call -800-QUIT-NOW if you do not meet the above criteria.            Language Assistance Services     ATTENTION: Language assistance services  are available, free of charge. Please call [phone].      ATENCIÓN: Si habla jose jañol, tiene a samson disposición servicios gratuitos de asistencia lingüística. Llame al [phone].     CHÚ Ý: N?u b?n nói Ti?ng Vi?t, có các d?ch v? h? tr? ngôn ng? mi?n phí dành cho b?n. G?i s? [phone].         [facility] complies with applicable Federal civil rights laws and does not discriminate on the basis of race, color, national origin, age, disability, or sex.

## 2017-02-21 RX ORDER — LIDOCAINE HYDROCHLORIDE 10 MG/ML
1 INJECTION, SOLUTION EPIDURAL; INFILTRATION; INTRACAUDAL; PERINEURAL
Status: COMPLETED | OUTPATIENT
Start: 2017-02-21 | End: 2017-02-20

## 2017-02-21 NOTE — PROGRESS NOTES
Subjective:       Patient ID: Nguyễn Gale Jr. is a 31 y.o. male.    Chief Complaint: Rash (entire body) broke out the night  after cutting down some mainor off a fence in his yard. Pt went in the   Hot tub and said it made worse after the heat. Pt still having ear pain and fever  HPI see above  Review of Systems   Constitutional: Positive for fever.   HENT: Positive for congestion, ear pain and rhinorrhea.    Eyes: Negative.    Respiratory: Positive for cough.    Cardiovascular: Negative.    Gastrointestinal: Negative.    Endocrine: Negative.    Genitourinary: Negative.    Musculoskeletal: Negative.    Skin: Positive for color change and rash.   Allergic/Immunologic: Positive for environmental allergies.   Neurological: Negative.    Hematological: Negative.    Psychiatric/Behavioral: Negative.        Objective:      Physical Exam   Constitutional: He appears well-developed and well-nourished.   HENT:   Head: Normocephalic and atraumatic.   Right Ear: Hearing, external ear and ear canal normal. Tympanic membrane is injected and erythematous.   Left Ear: Hearing, external ear and ear canal normal. Tympanic membrane is injected and erythematous.   Nose: No mucosal edema. Right sinus exhibits no maxillary sinus tenderness and no frontal sinus tenderness. Left sinus exhibits no maxillary sinus tenderness and no frontal sinus tenderness.   Eyes: Conjunctivae, EOM and lids are normal. Pupils are equal, round, and reactive to light. Lids are everted and swept, no foreign bodies found.   Neck: Normal range of motion. Neck supple. No thyromegaly present.   Cardiovascular: Normal rate, regular rhythm, normal heart sounds and intact distal pulses.    Pulmonary/Chest: Effort normal and breath sounds normal. No respiratory distress. He has no wheezes. He has no rales. He exhibits no tenderness.   Abdominal: Soft. Bowel sounds are normal. He exhibits no distension. There is no tenderness.   Skin: Skin is warm, dry and intact.  Rash noted. Rash is maculopapular.        Areas demarcated on the graph represent areas of maculopapular rash with some confluence   Nursing note and vitals reviewed.      Assessment:       1. Rash and nonspecific skin eruption    2. Abnormal liver function    3. Otitis, unspecified laterality    4. Abnormal liver function tests    5. Hyponatremia    6. Fever, unspecified fever cause        Plan:     we'll obtain, comprehensive metabolic profile, CBC to reassess abnormal labs.  Refer to the med card dated 2/20/2017 for medications recommended  We will contact patient with results when available.  triamcinolone acetonide injection 40 mg 40 mg Clinic/HOD 1 time      Sig: Inject 1 mL (40 mg total) into the muscle one time.     Class: Normal     Route: Intramuscular     cefTRIAXone injection 250 mg 250 mg Clinic/HOD 1 time     Sig: Inject 250 mg into the muscle one time.     Class: Normal     Route: Intramuscular

## 2017-02-24 ENCOUNTER — TELEPHONE (OUTPATIENT)
Dept: GASTROENTEROLOGY | Facility: CLINIC | Age: 32
End: 2017-02-24

## 2017-02-24 NOTE — TELEPHONE ENCOUNTER
Left message on patient's voicemail to inform him of approval and that someone from scheduling should be contacting him in regards to his portion of MRI. Asked that patient return phone call to office if any questions or concerns.  ----- Message from Drake Sanderson MD sent at 2/24/2017  8:45 AM CST -----  Approval number for MRI [id number]

## 2017-02-27 ENCOUNTER — HOSPITAL ENCOUNTER (OUTPATIENT)
Dept: RADIOLOGY | Facility: HOSPITAL | Age: 32
Discharge: HOME | End: 2017-02-27
Attending: INTERNAL MEDICINE
Payer: COMMERCIAL

## 2017-02-27 DIAGNOSIS — R79.89 LFT ELEVATION: ICD-10-CM

## 2017-02-27 PROCEDURE — 76376 3D RENDER W/INTRP POSTPROCES: CPT | Mod: 26,,, | Performed by: RADIOLOGY

## 2017-02-27 PROCEDURE — 74181 MRI ABDOMEN W/O CONTRAST: CPT | Mod: 26,,, | Performed by: RADIOLOGY

## 2017-02-27 PROCEDURE — 74181 MRI ABDOMEN W/O CONTRAST: CPT | Mod: TC

## 2017-03-01 ENCOUNTER — OFFICE VISIT (OUTPATIENT)
Dept: INTERNAL MEDICINE | Facility: CLINIC | Age: 32
End: 2017-03-01
Payer: COMMERCIAL

## 2017-03-01 VITALS
HEIGHT: 71 IN | WEIGHT: 222.69 LBS | BODY MASS INDEX: 31.18 KG/M2 | HEART RATE: 102 BPM | SYSTOLIC BLOOD PRESSURE: 100 MMHG | TEMPERATURE: 99 F | DIASTOLIC BLOOD PRESSURE: 62 MMHG

## 2017-03-01 DIAGNOSIS — L50.8 URTICARIA, ACUTE: ICD-10-CM

## 2017-03-01 DIAGNOSIS — Z20.7 SCABIES EXPOSURE: Primary | ICD-10-CM

## 2017-03-01 PROCEDURE — 99214 OFFICE O/P EST MOD 30 MIN: CPT | Mod: S$GLB,,, | Performed by: FAMILY MEDICINE

## 2017-03-01 PROCEDURE — 99999 PR PBB SHADOW E&M-EST. PATIENT-LVL III: CPT | Mod: PBBFAC,,, | Performed by: FAMILY MEDICINE

## 2017-03-01 PROCEDURE — 1160F RVW MEDS BY RX/DR IN RCRD: CPT | Mod: S$GLB,,, | Performed by: FAMILY MEDICINE

## 2017-03-01 RX ORDER — PERMETHRIN 50 MG/G
CREAM TOPICAL ONCE
Qty: 60 G | Refills: 1 | Status: SHIPPED | OUTPATIENT
Start: 2017-03-01 | End: 2017-03-01

## 2017-03-01 RX ORDER — PREDNISONE 20 MG/1
TABLET ORAL
Qty: 15 TABLET | Refills: 0 | Status: SHIPPED | OUTPATIENT
Start: 2017-03-01 | End: 2017-03-10

## 2017-03-01 NOTE — PROGRESS NOTES
Subjective:   Patient ID: Nguyễn Gale Jr. is a 31 y.o. male.    Chief Complaint: Rash    Cordial 32 yo male with ongoing rash with multiple doctor visits and rash is still not better. What is news is that his girlfriend was diagnosed with scabies recently. He shows me many lesions that are consistent with scabies but also has a neck, trunk, and ext rashes that are fine and maculopapular in nature and red.  Very pruritic. No fevers or chills. No rash on palms soles.  Rash   This is a chronic problem. The current episode started 1 to 4 weeks ago. The problem is unchanged. The affected locations include the neck, chest, torso, back, left elbow, left arm, left shoulder, abdomen, left wrist, left hand, left fingers, left hip, left buttock, left upper leg, left lower leg, left ankle, left foot, right wrist, right hand, right elbow, right arm, right axilla, right shoulder, right foot, right ankle, right lowerleg, right upper leg, right buttock, right hip and right fingers. The rash is characterized by redness and itchiness. Pertinent negatives include no anorexia, congestion, cough, diarrhea, eye pain, facial edema, fatigue, fever, joint pain, nail changes, rhinorrhea, shortness of breath, sore throat or vomiting. Past treatments include oral steroids, antibiotics and antihistamine. The treatment provided no relief. There is no history of allergies, asthma, eczema or varicella.         Patient queried and denies any further complaints.    ALLERGIES AND MEDICATIONS: updated and reviewed.  Review of patient's allergies indicates:   Allergen Reactions    Plantain (jaime)      Butterfly vine       Current Outpatient Prescriptions:     dicyclomine (BENTYL) 20 mg tablet, Take 20 mg by mouth every 6 (six) hours., Disp: , Rfl:     permethrin (ELIMITE) 5 % cream, Apply topically once., Disp: 60 g, Rfl: 1    predniSONE (DELTASONE) 20 MG tablet, 3 tablets daily for 5 days then stop., Disp: 15 tablet, Rfl: 0    Review of  "Systems   Constitutional: Negative for fatigue and fever.   HENT: Negative for congestion, rhinorrhea and sore throat.    Eyes: Negative for pain and redness.   Respiratory: Negative for cough and shortness of breath.    Cardiovascular: Negative for chest pain and leg swelling.   Gastrointestinal: Negative for anorexia, diarrhea and vomiting.   Endocrine: Negative for polyphagia and polyuria.   Genitourinary: Negative for dysuria and flank pain.   Musculoskeletal: Negative for joint pain, myalgias and neck stiffness.   Skin: Positive for rash. Negative for nail changes and wound.   Allergic/Immunologic: Negative for food allergies and immunocompromised state.   Neurological: Negative for facial asymmetry, light-headedness and headaches.   Hematological: Negative for adenopathy. Does not bruise/bleed easily.   Psychiatric/Behavioral: Negative for dysphoric mood and hallucinations.       Objective:     Vitals:    03/01/17 1627   BP: 100/62   Pulse: 102   Temp: 98.5 °F (36.9 °C)   Weight: 101 kg (222 lb 10.6 oz)   Height: 5' 11" (1.803 m)   PainSc:   2     Body mass index is 31.06 kg/(m^2).    Physical Exam   Constitutional: He is oriented to person, place, and time. He appears well-developed and well-nourished.   HENT:   Head: Normocephalic and atraumatic.   Right Ear: External ear normal.   Left Ear: External ear normal.   Nose: Nose normal.   Mouth/Throat: Oropharynx is clear and moist.   Cardiovascular: Normal rate, regular rhythm and normal heart sounds.    Pulmonary/Chest: Effort normal and breath sounds normal.   Neurological: He is alert and oriented to person, place, and time.   Skin: Skin is warm and dry. Burn and rash noted. No abrasion, no bruising, no ecchymosis, no laceration, no lesion, no petechiae and no purpura noted. Rash is maculopapular and urticarial. Rash is not macular, not papular, not nodular, not pustular and not vesicular. No erythema.        Pt with some lesions on hands and arms that are " maculopapular and scaling and are not inconsistent with scabies. All the other areas appear urticarial.    Psychiatric: He has a normal mood and affect. His behavior is normal.   Nursing note and vitals reviewed.      Assessment and Plan:   Nguyễn was seen today for rash.    Diagnoses and all orders for this visit:    Scabies exposure  Education on eradication given in detail and pt expressed understanding as evidenced by his brief summary back to me.  -     permethrin (ELIMITE) 5 % cream; Apply topically once.  FU if not better at all in 2 days--ok to call.    Urticaria, acute--as a result of the scabies bites.  --otc benadryl 50mg po q4 hrs when not working or driving  --otc zyrtec 10mg daily for 10-14 days  --o0tc zantac 150mg po bid for 10-14 days.  -     predniSONE (DELTASONE) 20 MG tablet; 3 tablets daily for 5 days then stop.    Time spent in the evaluation and management of this patient exceeded 45 min and greater than 50% of this time was in face-to-face education regarding diagnoses, medications, plan, and follow-up.      Return in about 2 days (around 3/3/2017).    THIS NOTE WILL BE SHARED WITH THE PATIENT.

## 2017-03-10 ENCOUNTER — LAB VISIT (OUTPATIENT)
Dept: LAB | Facility: HOSPITAL | Age: 32
End: 2017-03-10
Attending: INTERNAL MEDICINE
Payer: COMMERCIAL

## 2017-03-10 ENCOUNTER — OFFICE VISIT (OUTPATIENT)
Dept: GASTROENTEROLOGY | Facility: CLINIC | Age: 32
End: 2017-03-10
Payer: COMMERCIAL

## 2017-03-10 VITALS
WEIGHT: 225.5 LBS | BODY MASS INDEX: 31.57 KG/M2 | HEIGHT: 71 IN | SYSTOLIC BLOOD PRESSURE: 151 MMHG | HEART RATE: 84 BPM | DIASTOLIC BLOOD PRESSURE: 73 MMHG

## 2017-03-10 DIAGNOSIS — R79.89 LFT ELEVATION: ICD-10-CM

## 2017-03-10 DIAGNOSIS — K80.20 SYMPTOMATIC CHOLELITHIASIS: Primary | ICD-10-CM

## 2017-03-10 LAB
ALBUMIN SERPL BCP-MCNC: 3.8 G/DL
ALP SERPL-CCNC: 71 U/L
ALT SERPL W/O P-5'-P-CCNC: 41 U/L
AST SERPL-CCNC: 24 U/L
BILIRUB DIRECT SERPL-MCNC: 0.2 MG/DL
BILIRUB SERPL-MCNC: 0.6 MG/DL
PROT SERPL-MCNC: 7.3 G/DL

## 2017-03-10 PROCEDURE — 99214 OFFICE O/P EST MOD 30 MIN: CPT | Mod: S$GLB,,, | Performed by: INTERNAL MEDICINE

## 2017-03-10 PROCEDURE — 99999 PR PBB SHADOW E&M-EST. PATIENT-LVL II: CPT | Mod: PBBFAC,,, | Performed by: INTERNAL MEDICINE

## 2017-03-10 PROCEDURE — 1160F RVW MEDS BY RX/DR IN RCRD: CPT | Mod: S$GLB,,, | Performed by: INTERNAL MEDICINE

## 2017-03-10 PROCEDURE — 80076 HEPATIC FUNCTION PANEL: CPT

## 2017-03-10 PROCEDURE — 36415 COLL VENOUS BLD VENIPUNCTURE: CPT

## 2017-03-10 NOTE — PROGRESS NOTES
Subjective:       Patient ID: Nguyễn Gale Jr. is a 31 y.o. male.    Chief Complaint: Medicare AWV Follow Up    This is a 32yo male here for evaluation of abdominal pain.  He underwent an MRCP which was negative for retained stone. His symptoms are much improved, no current pain. He averages two alcoholic beverages a day.     Abdominal Pain   This is a recurrent problem. The current episode started more than 1 month ago. The onset quality is sudden. The problem occurs intermittently. The problem has been unchanged. The pain is located in the RUQ (feels similar to pain with prior cholelithiasis). The pain is at a severity of 5/10. The pain is moderate. The quality of the pain is colicky. The abdominal pain does not radiate. Pertinent negatives include no constipation, diarrhea, fever, hematuria, nausea or vomiting. Nothing aggravates the pain. The pain is relieved by nothing. He has tried nothing for the symptoms. The treatment provided no relief. Prior diagnostic workup includes GI consult. His past medical history is significant for abdominal surgery. There is no history of PUD.     Review of Systems   Constitutional: Negative for chills and fever.   HENT: Negative for postnasal drip and trouble swallowing.    Eyes: Negative for pain and visual disturbance.   Respiratory: Negative for cough, choking and shortness of breath.    Cardiovascular: Negative for chest pain and leg swelling.   Gastrointestinal: Positive for abdominal pain. Negative for abdominal distention, anal bleeding, blood in stool, constipation, diarrhea, nausea, rectal pain and vomiting.   Endocrine: Negative for cold intolerance and heat intolerance.   Genitourinary: Negative for difficulty urinating and hematuria.   Neurological: Negative for dizziness and numbness.   Hematological: Negative for adenopathy. Does not bruise/bleed easily.   Psychiatric/Behavioral: Negative for agitation and confusion.       Objective:      Physical Exam    Constitutional: He is oriented to person, place, and time. He appears well-developed and well-nourished. No distress.   HENT:   Head: Normocephalic.   Eyes: Conjunctivae are normal. No scleral icterus.   Neck: No tracheal deviation present. No thyromegaly present.   Cardiovascular: Normal rate, regular rhythm and normal heart sounds.  Exam reveals no gallop and no friction rub.    No murmur heard.  Pulmonary/Chest: Effort normal and breath sounds normal. He has no wheezes. He has no rales.   Abdominal: Soft. Bowel sounds are normal. He exhibits no distension. There is no tenderness. There is no rebound and no guarding.   Musculoskeletal: Normal range of motion. He exhibits no edema or tenderness.   Neurological: He is alert and oriented to person, place, and time.   Skin: He is not diaphoretic.   Psychiatric: He has a normal mood and affect. His behavior is normal.       MRCP reviewed, images/report - no stone visualized  Labs: ALT 75  Assessment:       1. Symptomatic cholelithiasis    2. LFT elevation        Plan:   1. Repeat LFTs  2. Passed stone, false negative MRCP, SOD all possibilities  3. Continue Bentyl

## 2017-03-13 ENCOUNTER — TELEPHONE (OUTPATIENT)
Dept: GASTROENTEROLOGY | Facility: CLINIC | Age: 32
End: 2017-03-13

## 2017-03-13 NOTE — TELEPHONE ENCOUNTER
Spoke with patient to inform him that liver function has normalized. Advised patient to contact office if he is still having symptoms. Patient verbalized understanding.    ----- Message from Drake Sanderson MD sent at 3/10/2017 12:50 PM CST -----  Liver function has normalized, with further pain I'd like him to contact me

## 2017-07-06 ENCOUNTER — HOSPITAL ENCOUNTER (OUTPATIENT)
Dept: RADIOLOGY | Facility: HOSPITAL | Age: 32
Discharge: HOME | End: 2017-07-06
Attending: FAMILY MEDICINE
Payer: COMMERCIAL

## 2017-07-06 ENCOUNTER — OFFICE VISIT (OUTPATIENT)
Dept: FAMILY MEDICINE | Facility: CLINIC | Age: 32
End: 2017-07-06
Payer: COMMERCIAL

## 2017-07-06 VITALS
BODY MASS INDEX: 32.55 KG/M2 | HEIGHT: 72 IN | DIASTOLIC BLOOD PRESSURE: 92 MMHG | TEMPERATURE: 99 F | WEIGHT: 240.31 LBS | SYSTOLIC BLOOD PRESSURE: 120 MMHG

## 2017-07-06 DIAGNOSIS — S29.9XXS TRAUMA OF CHEST, SEQUELA: Primary | ICD-10-CM

## 2017-07-06 DIAGNOSIS — S99.921A FOOT TRAUMA, RIGHT, INITIAL ENCOUNTER: ICD-10-CM

## 2017-07-06 PROCEDURE — 73630 X-RAY EXAM OF FOOT: CPT | Mod: TC,PO,RT

## 2017-07-06 PROCEDURE — 99213 OFFICE O/P EST LOW 20 MIN: CPT | Mod: S$GLB,,, | Performed by: FAMILY MEDICINE

## 2017-07-06 PROCEDURE — 99999 PR PBB SHADOW E&M-EST. PATIENT-LVL III: CPT | Mod: PBBFAC,,, | Performed by: FAMILY MEDICINE

## 2017-07-06 PROCEDURE — 73630 X-RAY EXAM OF FOOT: CPT | Mod: 26,RT,, | Performed by: RADIOLOGY

## 2017-07-06 RX ORDER — TRAMADOL HYDROCHLORIDE 50 MG/1
50 TABLET ORAL EVERY 8 HOURS PRN
Qty: 60 TABLET | Refills: 0 | Status: SHIPPED | OUTPATIENT
Start: 2017-07-06 | End: 2017-07-16

## 2017-07-07 NOTE — PROGRESS NOTES
Subjective:       Patient ID: Nguyễn Gale Jr. is a 31 y.o. male.    Chief Complaint: Foot Pain (right foot)   patient lost his footing while walking and twisted his foot approximately 2 weeks ago and noticed   pain on the dorsal lateral portion of his foot is not getting any better, and gets worse throughout the day with standing.  HPI see above  Review of Systems   Constitutional: Negative.    HENT: Negative.    Eyes: Negative.    Respiratory: Negative.    Cardiovascular: Negative.    Gastrointestinal: Negative.    Endocrine: Negative.    Genitourinary: Negative.    Musculoskeletal: Positive for arthralgias and joint swelling.   Skin: Negative.    Allergic/Immunologic: Negative.    Neurological: Negative.    Hematological: Negative.    Psychiatric/Behavioral: Negative.        Objective:      Physical Exam   Constitutional: He appears well-developed and well-nourished.   HENT:   Head: Normocephalic and atraumatic.   Eyes: Conjunctivae and EOM are normal. Pupils are equal, round, and reactive to light.   Pulmonary/Chest: Effort normal.   Musculoskeletal: He exhibits tenderness and deformity.        Right ankle: Normal. He exhibits normal range of motion, no swelling and no deformity. No tenderness.        Left ankle: Normal.        Right foot: There is decreased range of motion, tenderness, swelling and deformity.        Left foot: Normal.   Nursing note and vitals reviewed.      Assessment:       1. Foot pain   2. Foot trauma, right, initial encounter        Plan:     results of the x-rays were discussed with the patient at this appointment    x-rays obtained of the right foot did not show any specific fractures  Refer to the med card dated 7/6/2017   tramadol (ULTRAM) 50 mg tablet prescribed to be used for pain as directed.  Recommend patient wear a good firm shoe elevate foot when able

## 2017-08-29 ENCOUNTER — OFFICE VISIT (OUTPATIENT)
Dept: FAMILY MEDICINE | Facility: CLINIC | Age: 32
End: 2017-08-29
Payer: COMMERCIAL

## 2017-08-29 ENCOUNTER — OFFICE VISIT (OUTPATIENT)
Dept: ORTHOPEDICS | Facility: CLINIC | Age: 32
End: 2017-08-29
Payer: COMMERCIAL

## 2017-08-29 ENCOUNTER — HOSPITAL ENCOUNTER (OUTPATIENT)
Dept: RADIOLOGY | Facility: HOSPITAL | Age: 32
Discharge: HOME | End: 2017-08-29
Attending: NURSE PRACTITIONER
Payer: COMMERCIAL

## 2017-08-29 VITALS
SYSTOLIC BLOOD PRESSURE: 123 MMHG | HEIGHT: 72 IN | BODY MASS INDEX: 31.74 KG/M2 | HEART RATE: 75 BPM | WEIGHT: 234.38 LBS | DIASTOLIC BLOOD PRESSURE: 68 MMHG

## 2017-08-29 VITALS
HEART RATE: 90 BPM | HEIGHT: 72 IN | SYSTOLIC BLOOD PRESSURE: 120 MMHG | TEMPERATURE: 99 F | BODY MASS INDEX: 32.25 KG/M2 | WEIGHT: 238.13 LBS | DIASTOLIC BLOOD PRESSURE: 86 MMHG

## 2017-08-29 DIAGNOSIS — M25.521 RIGHT ELBOW PAIN: Primary | ICD-10-CM

## 2017-08-29 DIAGNOSIS — M25.521 RIGHT ELBOW PAIN: ICD-10-CM

## 2017-08-29 PROCEDURE — 99999 PR PBB SHADOW E&M-EST. PATIENT-LVL III: CPT | Mod: PBBFAC,,, | Performed by: PHYSICIAN ASSISTANT

## 2017-08-29 PROCEDURE — 73080 X-RAY EXAM OF ELBOW: CPT | Mod: 26,RT,, | Performed by: RADIOLOGY

## 2017-08-29 PROCEDURE — 99214 OFFICE O/P EST MOD 30 MIN: CPT | Mod: S$GLB,,, | Performed by: NURSE PRACTITIONER

## 2017-08-29 PROCEDURE — 3008F BODY MASS INDEX DOCD: CPT | Mod: S$GLB,,, | Performed by: NURSE PRACTITIONER

## 2017-08-29 PROCEDURE — 99499 UNLISTED E&M SERVICE: CPT | Mod: S$GLB,,, | Performed by: PHYSICIAN ASSISTANT

## 2017-08-29 PROCEDURE — 99999 PR PBB SHADOW E&M-EST. PATIENT-LVL IV: CPT | Mod: PBBFAC,,, | Performed by: NURSE PRACTITIONER

## 2017-08-29 PROCEDURE — 73080 X-RAY EXAM OF ELBOW: CPT | Mod: TC,PO,RT

## 2017-08-29 RX ORDER — INDOMETHACIN 75 MG/1
75 CAPSULE, EXTENDED RELEASE ORAL 2 TIMES DAILY WITH MEALS
Qty: 60 CAPSULE | Refills: 1 | Status: SHIPPED | OUTPATIENT
Start: 2017-08-29 | End: 2017-10-03

## 2017-08-29 NOTE — PROGRESS NOTES
Subjective:       Patient ID: Nguyễn Gale Jr. is a 31 y.o. male.    Chief Complaint: Elbow Pain (Right elbow)    Pt here with right elbow pain, pt states pain started 2-3d ago and has gotten worse.  Pt states that he went to play golf last week and was throwing paper baseballs and he thinks that is what aggravated his elbow.  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  No fever or chills, no redness.  Pt states that he took 3 Excedrin this am around 7 am for pain and it didn't help      Elbow Pain   Associated symptoms include arthralgias. Pertinent negatives include no chills, fatigue, fever, joint swelling, myalgias, neck pain or rash.     Past Medical History:   Diagnosis Date    Anxiety     GERD (gastroesophageal reflux disease)     Tobacco use      Past Surgical History:   Procedure Laterality Date    CHOLECYSTECTOMY       Social History     Social History Narrative    No narrative on file     Family History   Problem Relation Age of Onset    No Known Problems Mother     Peripheral vascular disease Father     Diabetes Father     Heart disease Father     Hypertension Father     Hyperlipidemia Father     Cirrhosis Maternal Grandfather     Liver disease Maternal Grandfather     Heart disease Paternal Grandfather     Diabetes Paternal Grandfather     Hypertension Paternal Grandfather     Hyperlipidemia Paternal Grandfather     Colon cancer Paternal Grandfather     Colon polyps Paternal Grandfather     Glaucoma Maternal Grandmother     Amblyopia Neg Hx     Blindness Neg Hx     Cataracts Neg Hx     Macular degeneration Neg Hx     Retinal detachment Neg Hx     Strabismus Neg Hx     Celiac disease Neg Hx     Crohn's disease Neg Hx     Esophageal cancer Neg Hx     Stomach cancer Neg Hx     Ulcerative colitis Neg Hx      Vitals:    08/29/17 0946   BP: 120/86   Pulse: 90  "  Temp: 98.5 °F (36.9 °C)   Weight: 108 kg (238 lb 1.6 oz)   Height: 6' (1.829 m)   PainSc:   6     Outpatient Encounter Prescriptions as of 8/29/2017   Medication Sig Dispense Refill    indomethacin (INDOCIN SR) 75 mg CpSR CR capsule Take 1 capsule (75 mg total) by mouth 2 (two) times daily with meals. 60 capsule 1     No facility-administered encounter medications on file as of 8/29/2017.      Wt Readings from Last 3 Encounters:   08/29/17 108 kg (238 lb 1.6 oz)   07/06/17 109 kg (240 lb 4.8 oz)   03/10/17 102.3 kg (225 lb 8.5 oz)     Last 3 sets of Vitals    Vitals - 1 value per visit 3/10/2017 7/6/2017 8/29/2017   SYSTOLIC 151 120 120   DIASTOLIC 73 92 86   PULSE 84 - 90   TEMPERATURE - 98.5 98.5   RESPIRATIONS - - -   Weight (lb) 225.53 240.3 238.1   Weight (kg) 102.3 109 108   HEIGHT 5' 11" 6' 0" 6' 0"   BODY MASS INDEX 31.46 32.59 32.29   VISIT REPORT - - -   Pain Score  0 6 6   Some recent data might be hidden     No results found for: CBC  Review of Systems   Constitutional: Negative for chills, fatigue and fever.   Musculoskeletal: Positive for arthralgias. Negative for joint swelling, myalgias, neck pain and neck stiffness.   Skin: Negative for color change and rash.   Hematological: Negative for adenopathy.   Psychiatric/Behavioral: Negative for sleep disturbance.       Objective:      Physical Exam   Constitutional: He is oriented to person, place, and time. He appears well-developed and well-nourished. No distress.   HENT:   Head: Normocephalic and atraumatic.   Eyes: Conjunctivae are normal. Pupils are equal, round, and reactive to light.   Pulmonary/Chest: Effort normal.   Musculoskeletal:        Right elbow: He exhibits normal range of motion, no swelling, no effusion, no deformity and no laceration. Tenderness found. Medial epicondyle tenderness noted. No radial head, no lateral epicondyle and no olecranon process tenderness noted.   Neurological: He is alert and oriented to person, place, and " time.   Skin: Skin is warm and dry. Capillary refill takes less than 2 seconds. He is not diaphoretic.   Psychiatric: He has a normal mood and affect. His behavior is normal. Judgment and thought content normal.   Nursing note and vitals reviewed.      Assessment:       1. Right elbow pain        Plan:         Ace wrap applied after xrays obtained    Nguyễn was seen today for elbow pain.    Diagnoses and all orders for this visit:    Right elbow pain  -     X-Ray Elbow Complete Right; Future  -     indomethacin (INDOCIN SR) 75 mg CpSR CR capsule; Take 1 capsule (75 mg total) by mouth 2 (two) times daily with meals.  -     Ambulatory referral to Orthopedics  -     POCT Apply ace wrap      Patient Instructions       Understanding Medial Epicondylitis    Several muscles attach to the arm at the elbow joint. The tough bands of tissue that attach muscle to bones are called tendons. The bone in the upper arm has knobs on the farthest end called epicondyles. Tendons attach some arm muscles to these knobs. The tissues in this area can become irritated.  Epicondylitis is the medical term for a painful elbow over the epicondyle. Medial refers to the inner side of the elbow. Medial epicondylitis is sometimes called golfers elbow.     How to say it  VIOLETA-traci-[facility]-OKAS-dye-lie-tis   Causes of medial epicondylitis  A painful inner elbow may be caused by:  · Using an elbow or hand the same way over and over  · Using poor form or too much force in a sport such as golf, tennis, or baseball  · Lifting too heavy a weight  · Other injuries to the arm or elbow  Symptoms of medial epicondylitis  · Pain or tenderness on the inside of the elbow that may travel down the forearm  · Pain when moving the wrist  · Pain or weakness when gripping something  · A crackling sound or grating feeling when moving the elbow  Treatment for medial epicondylitis  Treatments may include:  · Avoiding or changing the action that caused the problem.  This helps prevent irritating the tissues more.  · Prescription or over-the-counter pain medicines. These help reduce inflammation, swelling, and pain.  · Cold or heat packs. These help reduce pain and swelling.  · Stretching and other exercises. These improve flexibility and strength.  · Physical therapy. This may include exercises or other treatments.  · Injections of medicine. This may relieve symptoms.  If other treatments do not relieve symptoms, you may need surgery.  Possible complications  If you dont give your elbow time to heal, symptoms may return or get worse. Follow your healthcare providers instructions on resting and treating your elbow.     When to call your healthcare provider  Call your healthcare provider right away if you have any of these:  · Fever of 100.4°F (38°C) or higher, or as directed  · Redness, swelling, or warmth that gets worse  · Symptoms that dont get better with prescribed medicines, or get worse  · New symptoms   Date Last Reviewed: 3/10/2016  © 8630-5107 [employer]. [address] [place] [address], [place] [address]. All rights reserved. This information is not intended as a substitute for professional medical care. Always follow your healthcare professional's instructions.

## 2017-08-29 NOTE — PROGRESS NOTES
Patient currently being treated for medial epicondylitis in proper fashion.  Second concern revolve around old fractures of the radial head possibly on an inability to fully extend the elbow for this we will consult to Dr. Cano for evaluation.  This will be a no charge visit

## 2017-08-29 NOTE — PATIENT INSTRUCTIONS
Understanding Medial Epicondylitis    Several muscles attach to the arm at the elbow joint. The tough bands of tissue that attach muscle to bones are called tendons. The bone in the upper arm has knobs on the farthest end called epicondyles. Tendons attach some arm muscles to these knobs. The tissues in this area can become irritated.  Epicondylitis is the medical term for a painful elbow over the epicondyle. Medial refers to the inner side of the elbow. Medial epicondylitis is sometimes called golfers elbow.     How to say it  VIOLETA-traci-[facility]-ks[facility]-IREJ-dye-lie-tis   Causes of medial epicondylitis  A painful inner elbow may be caused by:  · Using an elbow or hand the same way over and over  · Using poor form or too much force in a sport such as golf, tennis, or baseball  · Lifting too heavy a weight  · Other injuries to the arm or elbow  Symptoms of medial epicondylitis  · Pain or tenderness on the inside of the elbow that may travel down the forearm  · Pain when moving the wrist  · Pain or weakness when gripping something  · A crackling sound or grating feeling when moving the elbow  Treatment for medial epicondylitis  Treatments may include:  · Avoiding or changing the action that caused the problem. This helps prevent irritating the tissues more.  · Prescription or over-the-counter pain medicines. These help reduce inflammation, swelling, and pain.  · Cold or heat packs. These help reduce pain and swelling.  · Stretching and other exercises. These improve flexibility and strength.  · Physical therapy. This may include exercises or other treatments.  · Injections of medicine. This may relieve symptoms.  If other treatments do not relieve symptoms, you may need surgery.  Possible complications  If you dont give your elbow time to heal, symptoms may return or get worse. Follow your healthcare providers instructions on resting and treating your elbow.     When to call your healthcare provider  Call your  healthcare provider right away if you have any of these:  · Fever of 100.4°F (38°C) or higher, or as directed  · Redness, swelling, or warmth that gets worse  · Symptoms that dont get better with prescribed medicines, or get worse  · New symptoms   Date Last Reviewed: 3/10/2016  © 4657-9228 [employer]. [address] [place] [address], [place] [address]. All rights reserved. This information is not intended as a substitute for professional medical care. Always follow your healthcare professional's instructions.

## 2017-08-29 NOTE — PROGRESS NOTES
Two identifiers verified, did complete right elbow x-rays. As per NP Filipe's orders. Ace wrap applied to right elbow with adequate allowance for circulation. Patient was  instructed on wrapping technique and checking allowance for circulation by inserting two fingers between skin and wrap. He verbalizes understanding.

## 2017-08-29 NOTE — LETTER
August 29, 2017      Christina Dent, FNP-C  [address] [facility]  [address]  [facility] [address]           [facility]  [address], [address]  [facility] [address]  Phone: [phone]          Patient: Nguyễn Gale Jr.   MR Number: [id number]   YOB: 1985   Date of Visit: 8/29/2017       Dear Christina Dent:    Thank you for referring Nguyễn Gale to me for evaluation. Attached you will find relevant portions of my assessment and plan of care.    If you have questions, please do not hesitate to call me. I look forward to following Nguyễn Gale along with you.    Sincerely,    Esvin Bhandari PA-C    Enclosure  CC:  No Recipients    If you would like to receive this communication electronically, please contact [email or web address] or [phone] to request more information on [employer] Link access.    For providers and/or their staff who would like to refer a patient to [facility], please contact us through our one-stop-shop provider referral line, [facility] [place], at [phone].    If you feel you have received this communication in error or would no longer like to receive these types of communications, please e-mail [email or web address]

## 2017-10-03 ENCOUNTER — OFFICE VISIT (OUTPATIENT)
Dept: ORTHOPEDICS | Facility: CLINIC | Age: 32
End: 2017-10-03
Payer: COMMERCIAL

## 2017-10-03 VITALS
SYSTOLIC BLOOD PRESSURE: 135 MMHG | HEIGHT: 72 IN | BODY MASS INDEX: 31.15 KG/M2 | HEART RATE: 102 BPM | DIASTOLIC BLOOD PRESSURE: 78 MMHG | WEIGHT: 230 LBS

## 2017-10-03 DIAGNOSIS — M25.521 RIGHT ELBOW PAIN: Primary | ICD-10-CM

## 2017-10-03 PROCEDURE — 99999 PR PBB SHADOW E&M-EST. PATIENT-LVL II: CPT | Mod: PBBFAC,,, | Performed by: ORTHOPAEDIC SURGERY

## 2017-10-03 PROCEDURE — 99203 OFFICE O/P NEW LOW 30 MIN: CPT | Mod: S$GLB,,, | Performed by: ORTHOPAEDIC SURGERY

## 2017-10-03 NOTE — PROGRESS NOTES
H&P  Orthopaedics    SUBJECTIVE:     History of Present Illness:  Patient is a 31 y.o. male RHD with right elbow pain and decreased ROM for ~15yrs.  He had questionable h/o trauma prior to that, but is unsure if he ever broke his arm or underwent treatment for an injury.  He has noticed a mechanical block to ROM, both in flexion/extension and pronation/supination.  He has pain when attempting to do recreational activities such as throw a baseball or golf.  Denies numbness/paresthesias.    Scheduled Meds:  Continuous Infusions:  PRN Meds:    Review of patient's allergies indicates:   Allergen Reactions    Plantain (jaime)      Butterfly vine       Past Medical History:   Diagnosis Date    Anxiety     GERD (gastroesophageal reflux disease)     Tobacco use      Past Surgical History:   Procedure Laterality Date    CHOLECYSTECTOMY       Family History   Problem Relation Age of Onset    No Known Problems Mother     Peripheral vascular disease Father     Diabetes Father     Heart disease Father     Hypertension Father     Hyperlipidemia Father     Cirrhosis Maternal Grandfather     Liver disease Maternal Grandfather     Heart disease Paternal Grandfather     Diabetes Paternal Grandfather     Hypertension Paternal Grandfather     Hyperlipidemia Paternal Grandfather     Colon cancer Paternal Grandfather     Colon polyps Paternal Grandfather     Glaucoma Maternal Grandmother     Amblyopia Neg Hx     Blindness Neg Hx     Cataracts Neg Hx     Macular degeneration Neg Hx     Retinal detachment Neg Hx     Strabismus Neg Hx     Celiac disease Neg Hx     Crohn's disease Neg Hx     Esophageal cancer Neg Hx     Stomach cancer Neg Hx     Ulcerative colitis Neg Hx      Social History   Substance Use Topics    Smoking status: Current Every Day Smoker     Packs/day: 1.00     Years: 5.00     Types: Cigarettes    Smokeless tobacco: Never Used    Alcohol use 0.0 oz/week      Comment: Socially, not weekly,  occasionally only         Review of Systems:  Patient denies constitutional symptoms, cardiac symptoms, respiratory symptoms, GI symptoms.  The remainder of the musculoskeletal ROS is included in the HPI.      OBJECTIVE:     Vital Signs (Most Recent)  Pulse: 102 (10/03/17 1347)  BP: 135/78 (10/03/17 1347)    Physical Exam:  Gen:  No acute distress  CV:  Peripherally well-perfused.  Pulses 2+ bilaterally.  Lungs:  Normal respiratory effort.  Abdomen:  Soft, non-tender, non-distended  Head/Neck:  Normocephalic.  Atraumatic. No TTP, AROM and PROM intact without pain  Neuro:  CN intact without deficit, SILT throughout B/L Upper & Lower Extremities      MSK:  RUE: no gross deformity; ttp to radial head, olecranon, medial epicondyle; ROM: lacks 30 of extension, flex to 130, pronation 60, supination 20; mechanical blocks at extremes of ROM; AIN/PIN/radial/ulnar/median nerves intact; NVI; cap refill <3sec; stable to varus/valgus stress    Laboratory:  No results found for this or any previous visit (from the past 72 hour(s)).    Diagnostic Results:  Xrays: diffuse osteophytes to right elbow; ~30 degree angular deformity to right radial neck    ASSESSMENT/PLAN:     A/P: Nguyễn Gale . is a 31 y.o. 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  This is likely 2/2 trauma, given his young age.  His mechanical block to motion and his pain are likely from the osteophytes and deformity.  Discussed treatment at length with patient.  OK procedure can be performed to increase ROM, this can be done arthroscopically.  Will refer to sports department for this.  Angular deformity of the radial neck may need to be addressed as well.  Discussed with patient that this would most likely need to be with resection vs radial head arthroplasty.          Chirag Auguste M.D. PGY3  Orthopedic Surgery Resident

## 2017-10-09 NOTE — PROGRESS NOTES
I have personally taken the history and examined this patient. I agree with the resident's note as stated above. Refer to Dr. So for OK procedure arthroscopic

## 2019-07-24 ENCOUNTER — HOSPITAL ENCOUNTER (OUTPATIENT)
Dept: RADIOLOGY | Facility: HOSPITAL | Age: 34
Discharge: HOME | End: 2019-07-24
Attending: FAMILY MEDICINE
Payer: COMMERCIAL

## 2019-07-24 ENCOUNTER — OFFICE VISIT (OUTPATIENT)
Dept: PRIMARY CARE CLINIC | Facility: CLINIC | Age: 34
End: 2019-07-24
Payer: COMMERCIAL

## 2019-07-24 ENCOUNTER — TELEPHONE (OUTPATIENT)
Dept: GASTROENTEROLOGY | Facility: CLINIC | Age: 34
End: 2019-07-24

## 2019-07-24 ENCOUNTER — TELEPHONE (OUTPATIENT)
Dept: PRIMARY CARE CLINIC | Facility: CLINIC | Age: 34
End: 2019-07-24

## 2019-07-24 VITALS
SYSTOLIC BLOOD PRESSURE: 108 MMHG | BODY MASS INDEX: 33.77 KG/M2 | RESPIRATION RATE: 20 BRPM | TEMPERATURE: 98 F | WEIGHT: 249.31 LBS | DIASTOLIC BLOOD PRESSURE: 80 MMHG | HEIGHT: 72 IN

## 2019-07-24 DIAGNOSIS — K21.9 GASTROESOPHAGEAL REFLUX DISEASE, ESOPHAGITIS PRESENCE NOT SPECIFIED: Primary | ICD-10-CM

## 2019-07-24 DIAGNOSIS — Z87.19 HISTORY OF GALLSTONES: ICD-10-CM

## 2019-07-24 DIAGNOSIS — M25.521 RIGHT ELBOW PAIN: ICD-10-CM

## 2019-07-24 PROCEDURE — 73080 X-RAY EXAM OF ELBOW: CPT | Mod: TC,PN,RT

## 2019-07-24 PROCEDURE — 99999 PR PBB SHADOW E&M-EST. PATIENT-LVL IV: CPT | Mod: PBBFAC,,, | Performed by: FAMILY MEDICINE

## 2019-07-24 PROCEDURE — 73080 XR ELBOW COMPLETE 3 VIEW RIGHT: ICD-10-PCS | Mod: 26,RT,, | Performed by: RADIOLOGY

## 2019-07-24 PROCEDURE — 99214 OFFICE O/P EST MOD 30 MIN: CPT | Mod: S$GLB,,, | Performed by: FAMILY MEDICINE

## 2019-07-24 PROCEDURE — 73080 X-RAY EXAM OF ELBOW: CPT | Mod: 26,RT,, | Performed by: RADIOLOGY

## 2019-07-24 PROCEDURE — 3008F BODY MASS INDEX DOCD: CPT | Mod: CPTII,S$GLB,, | Performed by: FAMILY MEDICINE

## 2019-07-24 PROCEDURE — 3008F PR BODY MASS INDEX (BMI) DOCUMENTED: ICD-10-PCS | Mod: CPTII,S$GLB,, | Performed by: FAMILY MEDICINE

## 2019-07-24 PROCEDURE — 99214 PR OFFICE/OUTPT VISIT, EST, LEVL IV, 30-39 MIN: ICD-10-PCS | Mod: S$GLB,,, | Performed by: FAMILY MEDICINE

## 2019-07-24 PROCEDURE — 99999 PR PBB SHADOW E&M-EST. PATIENT-LVL IV: ICD-10-PCS | Mod: PBBFAC,,, | Performed by: FAMILY MEDICINE

## 2019-07-24 RX ORDER — PANTOPRAZOLE SODIUM 40 MG/1
TABLET, DELAYED RELEASE ORAL
Qty: 90 TABLET | Refills: 0 | Status: SHIPPED | OUTPATIENT
Start: 2019-07-24 | End: 2019-11-25 | Stop reason: SDUPTHER

## 2019-07-24 RX ORDER — SUCRALFATE 1 G/1
1 TABLET ORAL
Qty: 28 TABLET | Refills: 0 | Status: SHIPPED | OUTPATIENT
Start: 2019-07-24 | End: 2019-07-31

## 2019-07-24 RX ORDER — DICYCLOMINE HYDROCHLORIDE 10 MG/1
10 CAPSULE ORAL
Qty: 120 CAPSULE | Refills: 0 | Status: SHIPPED | OUTPATIENT
Start: 2019-07-24 | End: 2019-08-23

## 2019-07-24 NOTE — TELEPHONE ENCOUNTER
----- Message from Federico Munoz MD sent at 7/24/2019  1:32 PM CDT -----  A chronic dislocation seen on the elbow xray per the radiology report. No fractures. F/u with orthopedist as scheduled today. Encourage signing up soon for Gary. thx

## 2019-07-24 NOTE — TELEPHONE ENCOUNTER
----- Message from Perla Matias sent at 7/24/2019 11:48 AM CDT -----  Contact: [phone]/ pt information  Dr.Michael Munoz has placed a referral for the patient to be seen with Gastroenterology. The patient is established with  and would like to resume care with provider. The patient is not in a rush but there was no availability with provider to schedule. The patient will be placed on the waitlist, however when schedule opens up please call patient to schedule.     Gastroesophageal reflux disease, esophagitis presence not specified [K21.9]  History of gallstones [Z87.19]

## 2019-07-24 NOTE — PROGRESS NOTES
Subjective:   Patient ID: Nguyễn Gale Jr. is a 33 y.o. male.    Chief Complaint: GI Problem (stomach pain)      HPI  32 yo male w ho lap chol about 2015 and subsequent mri ercp per Dr. Sanderson 2017 here with contd occ RUQ abd pain that often radiates to right shoulder blade. Past ercp did not show residual stone in cbd. Pt also having severe reflux not necessarily always related to ruq pain. Sometimes reflux is so bad he does have emesis.  No hematemesis. No hematochezia or melena.   No weight loss. BMs overall nl.     Also has chronic dislocation of right elbow and would like referral.      Patient queried and denies any further complaints.      ALLERGIES AND MEDICATIONS: updated and reviewed.  Review of patient's allergies indicates:   Allergen Reactions    Plantain (jaime)      Butterfly vine       Current Outpatient Medications:     dicyclomine (BENTYL) 10 MG capsule, Take 1 capsule (10 mg total) by mouth 4 (four) times daily before meals and nightly., Disp: 120 capsule, Rfl: 0    pantoprazole (PROTONIX) 40 MG tablet, One po bid x 14 days then one po daily in am on empty stomach, Disp: 90 tablet, Rfl: 0    sucralfate (CARAFATE) 1 gram tablet, Take 1 tablet (1 g total) by mouth 4 (four) times daily before meals and nightly. for 7 days, Disp: 28 tablet, Rfl: 0    Review of Systems   Constitutional: Negative for activity change, appetite change, chills, diaphoresis, fatigue, fever and unexpected weight change.   HENT: Negative for congestion, ear discharge, ear pain, facial swelling, hearing loss, nosebleeds, postnasal drip, rhinorrhea, sinus pressure, sneezing, sore throat, tinnitus, trouble swallowing and voice change.    Eyes: Negative for photophobia, pain, discharge, redness, itching and visual disturbance.   Respiratory: Negative for cough, chest tightness, shortness of breath and wheezing.    Cardiovascular: Negative for chest pain, palpitations and leg swelling.   Gastrointestinal: Negative for  abdominal distention, abdominal pain, anal bleeding, blood in stool, constipation, diarrhea, nausea, rectal pain and vomiting.   Endocrine: Negative for cold intolerance, heat intolerance, polydipsia, polyphagia and polyuria.   Genitourinary: Negative for difficulty urinating, dysuria and flank pain.   Musculoskeletal: Negative for arthralgias, back pain, joint swelling, myalgias and neck pain.   Skin: Negative for rash.   Neurological: Negative for dizziness, tremors, seizures, syncope, speech difficulty, weakness, light-headedness, numbness and headaches.   Psychiatric/Behavioral: Negative for behavioral problems, confusion, decreased concentration, dysphoric mood, sleep disturbance and suicidal ideas. The patient is not nervous/anxious and is not hyperactive.        Objective:     Vitals:    07/24/19 1058   BP: 108/80   Resp: 20   Temp: 98.1 °F (36.7 °C)   Weight: 113.1 kg (249 lb 5.4 oz)   Height: 6' (1.829 m)   PainSc:   3     Body mass index is 33.82 kg/m².    Physical Exam   Constitutional: He appears well-developed and well-nourished.   HENT:   Head: Normocephalic and atraumatic.   Right Ear: Hearing, tympanic membrane, external ear and ear canal normal. No drainage or swelling. No decreased hearing is noted.   Left Ear: Hearing, tympanic membrane, external ear and ear canal normal. No drainage or swelling. No decreased hearing is noted.   Nose: Nose normal. No rhinorrhea.   Mouth/Throat: Oropharynx is clear and moist. No oropharyngeal exudate, posterior oropharyngeal edema or posterior oropharyngeal erythema.   Eyes: Pupils are equal, round, and reactive to light. Conjunctivae, EOM and lids are normal. Right eye exhibits no discharge and no exudate. Left eye exhibits no discharge and no exudate. Right conjunctiva is not injected. Left conjunctiva is not injected.   Neck: Trachea normal and full passive range of motion without pain. Normal carotid pulses, no hepatojugular reflux and no JVD present. Carotid  bruit is not present. No neck rigidity. No edema and no erythema present. No thyroid mass and no thyromegaly present.   Cardiovascular: Normal rate, regular rhythm and normal heart sounds.   Pulmonary/Chest: Effort normal. No respiratory distress.   Abdominal: Soft. Normal appearance and bowel sounds are normal. There is no tenderness. There is negative Landin's sign.   Lymphadenopathy:     He has no cervical adenopathy.   Neurological: He is alert.   Skin: Skin is warm and dry.   Psychiatric: He has a normal mood and affect. His speech is normal and behavior is normal.   Nursing note and vitals reviewed.      Assessment and Plan:   Nguyễn was seen today for gi problem.    Diagnoses and all orders for this visit:    Gastroesophageal reflux disease, esophagitis presence not specified  -     Ambulatory consult to Gastroenterology  -     CBC auto differential; Future  -     Comprehensive metabolic panel; Future    History of gallstones  -     Ambulatory consult to Gastroenterology  -     CBC auto differential; Future  -     Comprehensive metabolic panel; Future    Right elbow pain  -     X-Ray Elbow Complete Right; Future  -     Ambulatory Referral to Orthopedics    Other orders  -     pantoprazole (PROTONIX) 40 MG tablet; One po bid x 14 days then one po daily in am on empty stomach  -     sucralfate (CARAFATE) 1 gram tablet; Take 1 tablet (1 g total) by mouth 4 (four) times daily before meals and nightly. for 7 days  -     dicyclomine (BENTYL) 10 MG capsule; Take 1 capsule (10 mg total) by mouth 4 (four) times daily before meals and nightly.    Time spent in the evaluation and management of this patient exceeded 45min and greater than 50% of this time was in face-to-face education regarding diagnoses, medications, plan, and follow-up.      Follow up in about 2 weeks (around 8/7/2019).    THIS NOTE WILL BE SHARED WITH THE PATIENT.

## 2019-07-25 ENCOUNTER — TELEPHONE (OUTPATIENT)
Dept: PRIMARY CARE CLINIC | Facility: CLINIC | Age: 34
End: 2019-07-25

## 2019-07-25 NOTE — TELEPHONE ENCOUNTER
----- Message from Federico Munoz MD sent at 7/25/2019  9:03 AM CDT -----  Labs are normal. He should sign up for [facility] and was given info about this. Thank you!

## 2019-07-26 ENCOUNTER — OFFICE VISIT (OUTPATIENT)
Dept: SPORTS MEDICINE | Facility: CLINIC | Age: 34
End: 2019-07-26
Payer: COMMERCIAL

## 2019-07-26 VITALS
HEART RATE: 76 BPM | SYSTOLIC BLOOD PRESSURE: 143 MMHG | BODY MASS INDEX: 33.72 KG/M2 | WEIGHT: 249 LBS | HEIGHT: 72 IN | DIASTOLIC BLOOD PRESSURE: 89 MMHG

## 2019-07-26 DIAGNOSIS — M25.521 RIGHT ELBOW PAIN: ICD-10-CM

## 2019-07-26 PROCEDURE — 97110 PR THERAPEUTIC EXERCISES: ICD-10-PCS | Mod: S$GLB,,, | Performed by: ORTHOPAEDIC SURGERY

## 2019-07-26 PROCEDURE — 99999 PR PBB SHADOW E&M-EST. PATIENT-LVL III: ICD-10-PCS | Mod: PBBFAC,,, | Performed by: ORTHOPAEDIC SURGERY

## 2019-07-26 PROCEDURE — 97110 THERAPEUTIC EXERCISES: CPT | Mod: S$GLB,,, | Performed by: ORTHOPAEDIC SURGERY

## 2019-07-26 PROCEDURE — 99203 OFFICE O/P NEW LOW 30 MIN: CPT | Mod: S$GLB,,, | Performed by: ORTHOPAEDIC SURGERY

## 2019-07-26 PROCEDURE — 3008F PR BODY MASS INDEX (BMI) DOCUMENTED: ICD-10-PCS | Mod: CPTII,S$GLB,, | Performed by: ORTHOPAEDIC SURGERY

## 2019-07-26 PROCEDURE — 99203 PR OFFICE/OUTPT VISIT, NEW, LEVL III, 30-44 MIN: ICD-10-PCS | Mod: S$GLB,,, | Performed by: ORTHOPAEDIC SURGERY

## 2019-07-26 PROCEDURE — 99999 PR PBB SHADOW E&M-EST. PATIENT-LVL III: CPT | Mod: PBBFAC,,, | Performed by: ORTHOPAEDIC SURGERY

## 2019-07-26 PROCEDURE — 3008F BODY MASS INDEX DOCD: CPT | Mod: CPTII,S$GLB,, | Performed by: ORTHOPAEDIC SURGERY

## 2019-07-26 NOTE — LETTER
July 26, 2019      Federico Munoz MD  [address]  [facility] [address]           [facility]  [address]  [facility] [address]  Phone: [phone]          Patient: Nguyễn Gale Jr.   MR Number: [id number]   YOB: 1985   Date of Visit: 7/26/2019       Dear Dr. Federico Munoz:    Thank you for referring Nguyễn Gale to me for evaluation. Attached you will find relevant portions of my assessment and plan of care.    If you have questions, please do not hesitate to call me. I look forward to following Nguyễn Gale along with you.    Sincerely,    Sharon So MD    Enclosure  CC:  No Recipients    If you would like to receive this communication electronically, please contact [email or web address] or [phone] to request more information on [facility] access.    For providers and/or their staff who would like to refer a patient to [facility], please contact us through our one-stop-shop provider referral line, [facility], at [phone].    If you feel you have received this communication in error or would no longer like to receive these types of communications, please e-mail [email or web address]

## 2019-07-26 NOTE — PROGRESS NOTES
CC Right elbow pain    Patient is a 31 y.o. male RHD  with right elbow pain and decreased ROM for ~15yrs.  He had questionable h/o trauma prior to that, but is unsure if he ever broke as a child his arm or underwent treatment for an injury.  He has noticed a mechanical block to ROM, both in flexion/extension and pronation/supination.      He has pain when attempting to do recreational activities such as throw a baseball or golf.  Denies numbness/paresthesias.    affecting ADLS         Review of Systems   Constitution: Negative. Negative for chills, fever and night sweats.   HENT: Negative for congestion and headaches.    Eyes: Negative for blurred vision, left vision loss and right vision loss.   Cardiovascular: Negative for chest pain and syncope.   Respiratory: Negative for cough and shortness of breath.    Endocrine: Negative for polydipsia, polyphagia and polyuria.   Hematologic/Lymphatic: Negative for bleeding problem. Does not bruise/bleed easily.   Skin: Negative for dry skin, itching and rash.   Musculoskeletal: Negative for falls and muscle weakness.   Gastrointestinal: Negative for abdominal pain and bowel incontinence.   Genitourinary: Negative for bladder incontinence and nocturia.   Neurological: Negative for disturbances in coordination, loss of balance and seizures.   Psychiatric/Behavioral: Negative for depression. The patient does not have insomnia.    Allergic/Immunologic: Negative for hives and persistent infections.     PAST MEDICAL HISTORY:   Past Medical History:   Diagnosis Date    Anxiety     GERD (gastroesophageal reflux disease)     Tobacco use      PAST SURGICAL HISTORY:   Past Surgical History:   Procedure Laterality Date    CHOLECYSTECTOMY      CHOLECYSTECTOMY-LAPAROSCOPIC N/A 8/11/2015    Performed by Semaj Ferguson Jr., MD at [facility] OR [address] [place] [address]     FAMILY HISTORY:   Family History   Problem Relation Age of Onset    No Known Problems Mother     Peripheral vascular  disease Father     Diabetes Father     Heart disease Father     Hypertension Father     Hyperlipidemia Father     Cirrhosis Maternal Grandfather     Liver disease Maternal Grandfather     Heart disease Paternal Grandfather     Diabetes Paternal Grandfather     Hypertension Paternal Grandfather     Hyperlipidemia Paternal Grandfather     Colon cancer Paternal Grandfather     Colon polyps Paternal Grandfather     Glaucoma Maternal Grandmother     Amblyopia Neg Hx     Blindness Neg Hx     Cataracts Neg Hx     Macular degeneration Neg Hx     Retinal detachment Neg Hx     Strabismus Neg Hx     Celiac disease Neg Hx     Crohn's disease Neg Hx     Esophageal cancer Neg Hx     Stomach cancer Neg Hx     Ulcerative colitis Neg Hx      SOCIAL HISTORY:   Social History     Socioeconomic History    Marital status: Single     Spouse name: Not on file    Number of children: Not on file    Years of education: Not on file    Highest education level: Not on file   Occupational History    Occupation:     Social Needs    Financial resource strain: Not on file    Food insecurity:     Worry: Not on file     Inability: Not on file    Transportation needs:     Medical: Not on file     Non-medical: Not on file   Tobacco Use    Smoking status: Current Every Day Smoker     Packs/day: 1.00     Years: 5.00     Pack years: 5.00     Types: Cigarettes    Smokeless tobacco: Never Used   Substance and Sexual Activity    Alcohol use: Yes     Alcohol/week: 0.0 oz     Comment: Socially, not weekly, occasionally only     Drug use: Yes     Frequency: 7.0 times per week     Types: Marijuana    Sexual activity: Yes     Partners: Female   Lifestyle    Physical activity:     Days per week: Not on file     Minutes per session: Not on file    Stress: Not on file   Relationships    Social connections:     Talks on phone: Not on file     Gets together: Not on file     Attends [employer] service: Not on file      Active member of club or organization: Not on file     Attends meetings of clubs or organizations: Not on file     Relationship status: Not on file   Other Topics Concern    Not on file   Social History Narrative    Not on file       MEDICATIONS:   Current Outpatient Medications:     dicyclomine (BENTYL) 10 MG capsule, Take 1 capsule (10 mg total) by mouth 4 (four) times daily before meals and nightly., Disp: 120 capsule, Rfl: 0    pantoprazole (PROTONIX) 40 MG tablet, One po bid x 14 days then one po daily in am on empty stomach, Disp: 90 tablet, Rfl: 0    sucralfate (CARAFATE) 1 gram tablet, Take 1 tablet (1 g total) by mouth 4 (four) times daily before meals and nightly. for 7 days, Disp: 28 tablet, Rfl: 0  ALLERGIES:   Review of patient's allergies indicates:   Allergen Reactions    Plantain (jaime)      Butterfly vine       VITAL SIGNS: BP (!) 143/89   Pulse 76   Ht 6' (1.829 m)   Wt 112.9 kg (249 lb)   BMI 33.77 kg/m²        PHYSICAL EXAM:  General: Patient appears alert and oriented x 3.  Mood is pleasant.  Observation of ears, eyes and nose reveal no gross abnormalities. HEENT: NCAT, sclera nonicteric  Lungs: Respirations are equal and unlabored.  Well nourished, in no acute distress and ambulates with a non-antalgic gait with no assistive devices.    Skin: Skin intact bilaterally. Sensation intact bilaterally. Compartments soft. No evidence of edema, infection, or induration.     Right ELBOW / WRIST EXTREMITY EXAM:    OBSERVATION / INSPECTION    Swelling  none    Deformity  none  Discoloration  none     Scars   none    Atrophy  none    TENDERNESS / CREPITUS (T / C):           T / C        Medial epicondyle   + / -    Med. (Ulnar) collateral ligament  - / -    Flexor pronator Musculature   - / -   Biceps tendon    - / -   Head of radius    - / -    Lateral epicondyle   + / -    Extensor Musculature   - / -   Brachioradialis   - / -   Triceps tendon   - / -   Triceps muscle   - / -   Olecranon    - /  -   Olecranon bursa   - / -   Cubital fossa    - / -   Anterior jointline   - / -   Radial tunnel    -/ -             ROM: ('*' = with pain)    Right Elbow  AROM (PROM)     Extension   35 deg  (35 deg)   Flexion   120 deg (120 deg)         Pronation  90 deg  (90 deg)      Supination   80 deg  (80 deg)                 Left Elbow  AROM (PROM)     Extension   0 deg  (5 deg)   Flexion   145 deg (145 deg)         Pronation  80 deg  (80 deg)      Supination   50 deg  (50 deg)            Right Wrist  AROM (PROM)   Extension   80 deg (85 deg)   Flexion   80 deg (85 deg)         Ulnar Deviation   35 deg (40 deg)  Radial Deviation 35 deg (40 deg)             Left Wrist   AROM (PROM)     Extension   80 deg (85 deg)   Flexion   80 deg (85 deg)         Ulnar Deviation   35 deg (40 deg)  Radial Deviation 35 deg (40 deg)        STRENGTH: ('*' = with pain)    Elbow Flexion:   5/5  Elbow Extension:  5/5  Wrist Flexion:   5/5  Wrist Extension:  5/5  :    5/5  Intrinsics:   5/5  EPL (Ext. pollicis longus): 5/5  Pinch Mechanism:  5/5    ELBOW EXAMINATION:  See above noted areas of tenderness.   Test for Ligamentous Instability - UCL normal  Test for Ligamentous Instability - LUCL normal  PLRI       neg  Tinel's (Percussion) Test - Cubital  neg  Tennis Elbow Test    neg  Golfer's Elbow Test    neg  Radial Capitellar Grind Test   neg  Valgus/Extension Overload Test  neg  Resisted Long Finger Extension Pain neg  Moving Valgus    neg  Forearm pain with resisted supination neg  Yeargeson' s (elbow pain)   neg  Hook test     neg    WRIST EXAMINATION:  See above noted areas of tenderness.   Finkelstein's Test   neg  Tinel's Test - Carpal Tunnel  neg  Phalen's Test    neg  Median Nerve Compression Test neg  Ulnar-sided Compression Test neg  LT Ballottment Test   neg  Snuff box tenderness   neg  Manley's Test   neg  LT Instability    neg  Hook of Hamate Tenderness  neg     EXTREMITY NEURO-VASCULAR EXAMINATION: Sensation grossly intact to  light touch all dermatomal regions. DTR 2+ Biceps, Triceps, BR and Negative Sandor's sign. Grossly intact motor function at Elbow, Wrist and Hand. Distal pulses radial and ulnar 2+, brisk cap refill, symmetric.    Other Findings:  RUE: no gross deformity; ttp to radial head, olecranon, medial epicondyle;        mechanical blocks at extremes of ROM; AIN/PIN/radial/ulnar/median nerves intact; NVI; cap refill <3sec; stable to varus/valgus stress       Xrays: (AP, lateral, oblique) Right elbow ordered and reviewed by me personally today: no evidence dislocation.  Osseous structures appear intact.  diffuse osteophytes to right elbow; ~30 degree angular deformity to right radial neck       ASSESSMENT/PLAN:      ASSESSMENT:  Right elbow pain, lateral and medial epicondylitis, likely source of most of his pain  With deformity and osteophytes      PLAN:  Prince Twist bar  Stretches instructed by me  HEP 18234 - I, instructed and demonstrated a tennis elbow HEP. The patient then demonstrated understanding of exercises and proper technique. This program was performed for 16 minutes.   NSAIDs  RTC 2 months    Possible scope in future discussed

## 2019-11-25 RX ORDER — PANTOPRAZOLE SODIUM 40 MG/1
TABLET, DELAYED RELEASE ORAL
Qty: 90 TABLET | Refills: 0 | Status: SHIPPED | OUTPATIENT
Start: 2019-11-25 | End: 2021-05-26

## 2019-11-25 NOTE — TELEPHONE ENCOUNTER
----- Message from Marsha De Paz sent at 11/25/2019  9:38 AM CST -----  Contact: Starr(girlfriend) [phone]  Patient is calling for an RX refill or new RX.  Is this a refill or new RX:  new  RX name and strength: dicyclomine (BENTYL) 10 MG capsule  Directions (copy/paste from chart):  Take 1 capsule (10 mg total) by mouth 4 (four) times daily before meals and nightly  Is this a 30 day or 90 day RX:    Local pharmacy or mail order pharmacy:  local  Pharmacy name and phone # (copy/paste from chart):   [facility] #[id number] [place], [place] - [address] AT [facility] [address] [phone] (Phone)  [phone] (Fax)  Comments:      Patient is calling for an RX refill or new RX.  Is this a refill or new RX:  new  RX name and strength: pantoprazole (PROTONIX) 40 MG tablet  Directions (copy/paste from chart):   One po bid x 14 days then one po daily in am on empty stomach  Is this a 30 day or 90 day RX:    Local pharmacy or mail order pharmacy:  local  Pharmacy name and phone # (copy/paste from chart):   "[facility] #[id number] [facility], [place] - [address] AT [facility] [address] [phone] (Phone)  [phone] (Fax)  Comments:

## 2019-11-26 ENCOUNTER — TELEPHONE (OUTPATIENT)
Dept: PRIMARY CARE CLINIC | Facility: CLINIC | Age: 34
End: 2019-11-26

## 2019-11-26 NOTE — TELEPHONE ENCOUNTER
----- Message from Federico Munoz MD sent at 11/25/2019  1:36 PM CST -----  protonix refilled. Needs egd to look for ulcers, poss pre-cancerous lesions if med needs to be contd 1-2 more mo

## 2020-03-01 ENCOUNTER — OFFICE VISIT (OUTPATIENT)
Dept: URGENT CARE | Facility: CLINIC | Age: 35
End: 2020-03-01
Payer: COMMERCIAL

## 2020-03-01 VITALS
WEIGHT: 249 LBS | HEART RATE: 111 BPM | RESPIRATION RATE: 16 BRPM | BODY MASS INDEX: 33.72 KG/M2 | DIASTOLIC BLOOD PRESSURE: 93 MMHG | HEIGHT: 72 IN | SYSTOLIC BLOOD PRESSURE: 150 MMHG | OXYGEN SATURATION: 95 % | TEMPERATURE: 102 F

## 2020-03-01 DIAGNOSIS — R50.9 FEVER, UNSPECIFIED FEVER CAUSE: ICD-10-CM

## 2020-03-01 DIAGNOSIS — J10.1 INFLUENZA A: Primary | ICD-10-CM

## 2020-03-01 LAB
CTP QC/QA: YES
FLUAV AG NPH QL: POSITIVE
FLUBV AG NPH QL: NEGATIVE

## 2020-03-01 PROCEDURE — 87804 INFLUENZA ASSAY W/OPTIC: CPT | Mod: QW,S$GLB,, | Performed by: NURSE PRACTITIONER

## 2020-03-01 PROCEDURE — 99214 PR OFFICE/OUTPT VISIT, EST, LEVL IV, 30-39 MIN: ICD-10-PCS | Mod: 25,S$GLB,, | Performed by: NURSE PRACTITIONER

## 2020-03-01 PROCEDURE — 99214 OFFICE O/P EST MOD 30 MIN: CPT | Mod: 25,S$GLB,, | Performed by: NURSE PRACTITIONER

## 2020-03-01 PROCEDURE — 87804 POCT INFLUENZA A/B: ICD-10-PCS | Mod: QW,S$GLB,, | Performed by: NURSE PRACTITIONER

## 2020-03-01 RX ORDER — ONDANSETRON 4 MG/1
TABLET, ORALLY DISINTEGRATING ORAL
Qty: 10 TABLET | Refills: 0 | Status: SHIPPED | OUTPATIENT
Start: 2020-03-01 | End: 2021-05-26

## 2020-03-01 RX ORDER — OSELTAMIVIR PHOSPHATE 75 MG/1
75 CAPSULE ORAL 2 TIMES DAILY
Qty: 10 CAPSULE | Refills: 0 | Status: SHIPPED | OUTPATIENT
Start: 2020-03-01 | End: 2020-03-06

## 2020-03-01 RX ORDER — IBUPROFEN 200 MG
600 TABLET ORAL
Status: COMPLETED | OUTPATIENT
Start: 2020-03-01 | End: 2020-03-01

## 2020-03-01 RX ADMIN — Medication 600 MG: at 11:03

## 2020-03-01 NOTE — PROGRESS NOTES
Subjective:       Patient ID: Nguyễn Gale Jr. is a 34 y.o. male.    Vitals:  height is 6' (1.829 m) and weight is 112.9 kg (249 lb). His oral temperature is 101.5 °F (38.6 °C) (abnormal). His blood pressure is 150/93 (abnormal) and his pulse is 111 (abnormal). His respiration is 16 and oxygen saturation is 95%.     Chief Complaint: Sinus Problem    Patient reports sinus symptoms started 2 days ago.    Sinus Problem   This is a new problem. Episode onset: days. The problem has been gradually worsening since onset. The maximum temperature recorded prior to his arrival was 102 - 102.9 F. His pain is at a severity of 5/10. Associated symptoms include chills, congestion (head and chest), coughing, diaphoresis, headaches, sinus pressure, sneezing and a sore throat. Pertinent negatives include no ear pain or shortness of breath. Treatments tried: Day Quil Tylenol last dose at 9:00am today Emergent C. The treatment provided no relief.       Constitution: Positive for chills, sweating and fever (102 at home). Negative for fatigue.   HENT: Positive for congestion (head and chest), sinus pain, sinus pressure and sore throat. Negative for ear pain, postnasal drip and voice change.    Neck: Negative for painful lymph nodes.   Eyes: Negative for eye redness.   Respiratory: Positive for cough and sputum production (sometimes clear). Negative for chest tightness, bloody sputum, COPD, shortness of breath, stridor, wheezing and asthma.    Gastrointestinal: Negative for nausea and vomiting.   Musculoskeletal: Positive for muscle ache.   Skin: Negative for rash.   Allergic/Immunologic: Positive for sneezing. Negative for seasonal allergies and asthma.   Neurological: Positive for headaches.   Hematologic/Lymphatic: Negative for swollen lymph nodes.       Objective:      Physical Exam   Constitutional: He is oriented to person, place, and time. He appears well-developed and well-nourished. He is cooperative.  Non-toxic appearance.  He does not have a sickly appearance. He does not appear ill. No distress.   HENT:   Head: Normocephalic and atraumatic.   Right Ear: Hearing, tympanic membrane, external ear and ear canal normal.   Left Ear: Hearing, tympanic membrane, external ear and ear canal normal.   Nose: Rhinorrhea present. No mucosal edema or nasal deformity. No epistaxis. Right sinus exhibits no maxillary sinus tenderness and no frontal sinus tenderness. Left sinus exhibits no maxillary sinus tenderness and no frontal sinus tenderness.   Mouth/Throat: Uvula is midline, oropharynx is clear and moist and mucous membranes are normal. No trismus in the jaw. Normal dentition. No uvula swelling. No oropharyngeal exudate, posterior oropharyngeal edema or posterior oropharyngeal erythema.   Eyes: Conjunctivae and lids are normal. No scleral icterus.   Neck: Trachea normal, full passive range of motion without pain and phonation normal. Neck supple. No neck rigidity. No edema and no erythema present.   Cardiovascular: Normal rate, regular rhythm, normal heart sounds, intact distal pulses and normal pulses.   Pulmonary/Chest: Effort normal and breath sounds normal. No respiratory distress. He has no decreased breath sounds. He has no wheezes. He has no rhonchi.   Abdominal: Normal appearance.   Musculoskeletal: Normal range of motion. He exhibits no edema or deformity.   Neurological: He is alert and oriented to person, place, and time. He exhibits normal muscle tone. Coordination normal.   Skin: Skin is warm, dry, intact, not diaphoretic and not pale.   Psychiatric: He has a normal mood and affect. His speech is normal and behavior is normal. Judgment and thought content normal. Cognition and memory are normal.   Nursing note and vitals reviewed.    Results for orders placed or performed in visit on 03/01/20   POCT Influenza A/B   Result Value Ref Range    Rapid Influenza A Ag Positive (A) Negative    Rapid Influenza B Ag Negative Negative      Acceptable Yes          Assessment:       1. Influenza A    2. Fever, unspecified fever cause        Plan:         Influenza A  -     oseltamivir (TAMIFLU) 75 MG capsule; Take 1 capsule (75 mg total) by mouth 2 (two) times daily. for 5 days  Dispense: 10 capsule; Refill: 0  -     ondansetron (ZOFRAN-ODT) 4 MG TbDL; One tablet sublingual bid prn nausea  Dispense: 10 tablet; Refill: 0    Fever, unspecified fever cause  -     POCT Influenza A/B  -     ibuprofen tablet 600 mg      Patient Instructions     Tamiflu as directed, unless side effects are not tolerated then please stop medication.  Zofran as needed for nausea.  Increase fluids.  Rest.  Alternate Ibuprofen and Tylenol as needed for fever or body aches.    Handwashing.  See handout sheet.  12 hr Mucinex DM as needed for cough otc.  Follow up with PCP as needed.  Return here or go to the ER for worsening symptoms.  The Flu (Influenza)     The virus that causes the flu spreads through the air in droplets when someone who has the flu coughs, sneezes, laughs, or talks.   The flu (influenza) is an infection that affects your respiratory tract. This tract is made up of your mouth, nose, and lungs, and the passages between them. Unlike a cold, the flu can make you very ill. And it can lead to pneumonia, a serious lung infection. The flu can have serious complications and even cause death.  Who is at risk for the flu?  Anyone can get the flu. But you are more likely to become infected if you:  · Have a weakened immune system  · Work in a healthcare setting where you may be exposed to flu germs  · Live or work with someone who has the flu  · Havent had an annual flu shot  How does the flu spread?  The flu is caused by a virus. The virus spreads through the air in droplets when someone who has the flu coughs, sneezes, laughs, or talks. You can become infected when you inhale these viruses directly. You can also become infected when you touch a surface on  which the droplets have landed and then transfer the germs to your eyes, nose, or mouth. Touching used tissues, or sharing utensils, drinking glasses, or a toothbrush from an infected person can expose you to flu viruses, too.  What are the symptoms of the flu?  Flu symptoms tend to come on quickly and may last a few days to a few weeks. They include:  · Fever usually higher than 100.4°F  (38°C) and chills  · Sore throat and headache  · Dry cough  · Runny nose  · Tiredness and weakness  · Muscle aches  Who is at risk for flu complications?  For some people, the flu can be very serious. The risk for complications is greater for:  · Children younger than age 5  · Adults ages 65 and older  · People with a chronic illness such as diabetes or heart, kidney, or lung disease  · People who live in a nursing home or long-term care facility   How is the flu treated?  The flu usually gets better after 7 days or so. In some cases, your healthcare provider may prescribe an antiviral medicine. This may help you get well a little sooner. For the medicine to help, you need to take it as soon as possible (ideally within 48 hours) after your symptoms start. If you develop pneumonia or other serious illness, you may need to stay in the hospital.  Easing flu symptoms  · Drink lots of fluids such as water, juice, and warm soup. A good rule is to drink enough so that you urinate your normal amount.  · Get plenty of rest.  · Ask your healthcare provider what to take for fever and pain.  · Call your provider if your fever is 100.4°F (38°C) or higher, or you become dizzy, lightheaded, or short of breath.  Taking steps to protect others  · Wash your hands often, especially after coughing or sneezing. Or clean your hands with an alcohol-based hand  containing at least 60% alcohol.  · Cough or sneeze into a tissue. Then throw the tissue away and wash your hands. If you dont have a tissue, cough and sneeze into your elbow.  · Stay home  until at least 24 hours after you no longer have a fever or chills. Be sure the fever isnt being hidden by fever-reducing medicine.  · Dont share food, utensils, drinking glasses, or a toothbrush with others.  · Ask your healthcare provider if others in your household should get antiviral medicine to help them avoid infection.  How can the flu be prevented?  · One of the best ways to avoid the flu is to get a flu vaccine each year. The virus that causes the flu changes from year to year. For that reason, healthcare providers recommend getting the flu vaccine each year, as soon as it's available in your area. The vaccine is given as a shot. Your healthcare provider can tell you which vaccine is right for you. A nasal spray is also available but is not recommended for the 7553-5673 flu season. The CDC says this is because the nasal spray did not seem to protect against the flu over the last several flu seasons. In the past, it was meant for people ages 2 to 49.  · Wash your hands often. Frequent handwashing is a proven way to help prevent infection.  · Carry an alcohol-based hand gel containing at least 60% alcohol. Use it when you can't use soap and water. Then wash your hands as soon as you can.  · Avoid touching your eyes, nose, and mouth.  · At home and work, clean phones, computer keyboards, and toys often with disinfectant wipes.  · If possible, avoid close contact with others who have the flu or symptoms of the flu.  Handwashing tips  Handwashing is one of the best ways to prevent many common infections. If you are caring for or visiting someone with the flu, wash your hands each time you enter and leave the room. Follow these steps:  · Use warm water and plenty of soap. Rub your hands together well.  · Clean the whole hand, including under your nails, between your fingers, and up the wrists.  · Wash for at least 15 seconds.  · Rinse, letting the water run down your fingers, not up your wrists.  · Dry your  hands well. Use a paper towel to turn off the faucet and open the door.  Using alcohol-based hand   Alcohol-based hand  are also a good choice. Use them when you can't use soap and water. Follow these steps:  · Squeeze about a tablespoon of gel into the palm of one hand.  · Rub your hands together briskly, cleaning the backs of your hands, the palms, between your fingers, and up the wrists.  · Rub until the gel is gone and your hands are completely dry.  Preventing the flu in healthcare settings  The flu is a special concern for people in hospitals and long-term care facilities. To help prevent the spread of flu, many hospitals and nursing homes take these steps:  · Healthcare providers wash their hands or use an alcohol-based hand  before and after treating each patient.  · People with the flu have private rooms and bathrooms or share a room with someone with the same infection.  · People who are at high risk for the flu but don't have it are encouraged to get the flu and pneumonia vaccines.  · All healthcare workers are encouraged or required to get flu shots.   Date Last Reviewed: 12/1/2016  © 8399-2478 [employer]. [address] [place] [address], [place] [address]. All rights reserved. This information is not intended as a substitute for professional medical care. Always follow your healthcare professional's instructions.

## 2020-03-01 NOTE — PATIENT INSTRUCTIONS
Tamiflu as directed, unless side effects are not tolerated then please stop medication.  Zofran as needed for nausea.  Increase fluids.  Rest.  Alternate Ibuprofen and Tylenol as needed for fever or body aches.    Handwashing.  See handout sheet.  12 hr Mucinex DM as needed for cough otc.  Follow up with PCP as needed.  Return here or go to the ER for worsening symptoms.  The Flu (Influenza)     The virus that causes the flu spreads through the air in droplets when someone who has the flu coughs, sneezes, laughs, or talks.   The flu (influenza) is an infection that affects your respiratory tract. This tract is made up of your mouth, nose, and lungs, and the passages between them. Unlike a cold, the flu can make you very ill. And it can lead to pneumonia, a serious lung infection. The flu can have serious complications and even cause death.  Who is at risk for the flu?  Anyone can get the flu. But you are more likely to become infected if you:  · Have a weakened immune system  · Work in a healthcare setting where you may be exposed to flu germs  · Live or work with someone who has the flu  · Havent had an annual flu shot  How does the flu spread?  The flu is caused by a virus. The virus spreads through the air in droplets when someone who has the flu coughs, sneezes, laughs, or talks. You can become infected when you inhale these viruses directly. You can also become infected when you touch a surface on which the droplets have landed and then transfer the germs to your eyes, nose, or mouth. Touching used tissues, or sharing utensils, drinking glasses, or a toothbrush from an infected person can expose you to flu viruses, too.  What are the symptoms of the flu?  Flu symptoms tend to come on quickly and may last a few days to a few weeks. They include:  · Fever usually higher than 100.4°F  (38°C) and chills  · Sore throat and headache  · Dry cough  · Runny nose  · Tiredness and weakness  · Muscle aches  Who is at risk  for flu complications?  For some people, the flu can be very serious. The risk for complications is greater for:  · Children younger than age 5  · Adults ages 65 and older  · People with a chronic illness such as diabetes or heart, kidney, or lung disease  · People who live in a nursing home or long-term care facility   How is the flu treated?  The flu usually gets better after 7 days or so. In some cases, your healthcare provider may prescribe an antiviral medicine. This may help you get well a little sooner. For the medicine to help, you need to take it as soon as possible (ideally within 48 hours) after your symptoms start. If you develop pneumonia or other serious illness, you may need to stay in the hospital.  Easing flu symptoms  · Drink lots of fluids such as water, juice, and warm soup. A good rule is to drink enough so that you urinate your normal amount.  · Get plenty of rest.  · Ask your healthcare provider what to take for fever and pain.  · Call your provider if your fever is 100.4°F (38°C) or higher, or you become dizzy, lightheaded, or short of breath.  Taking steps to protect others  · Wash your hands often, especially after coughing or sneezing. Or clean your hands with an alcohol-based hand  containing at least 60% alcohol.  · Cough or sneeze into a tissue. Then throw the tissue away and wash your hands. If you dont have a tissue, cough and sneeze into your elbow.  · Stay home until at least 24 hours after you no longer have a fever or chills. Be sure the fever isnt being hidden by fever-reducing medicine.  · Dont share food, utensils, drinking glasses, or a toothbrush with others.  · Ask your healthcare provider if others in your household should get antiviral medicine to help them avoid infection.  How can the flu be prevented?  · One of the best ways to avoid the flu is to get a flu vaccine each year. The virus that causes the flu changes from year to year. For that reason, healthcare  providers recommend getting the flu vaccine each year, as soon as it's available in your area. The vaccine is given as a shot. Your healthcare provider can tell you which vaccine is right for you. A nasal spray is also available but is not recommended for the 4092-9486 flu season. The CDC says this is because the nasal spray did not seem to protect against the flu over the last several flu seasons. In the past, it was meant for people ages 2 to 49.  · Wash your hands often. Frequent handwashing is a proven way to help prevent infection.  · Carry an alcohol-based hand gel containing at least 60% alcohol. Use it when you can't use soap and water. Then wash your hands as soon as you can.  · Avoid touching your eyes, nose, and mouth.  · At home and work, clean phones, computer keyboards, and toys often with disinfectant wipes.  · If possible, avoid close contact with others who have the flu or symptoms of the flu.  Handwashing tips  Handwashing is one of the best ways to prevent many common infections. If you are caring for or visiting someone with the flu, wash your hands each time you enter and leave the room. Follow these steps:  · Use warm water and plenty of soap. Rub your hands together well.  · Clean the whole hand, including under your nails, between your fingers, and up the wrists.  · Wash for at least 15 seconds.  · Rinse, letting the water run down your fingers, not up your wrists.  · Dry your hands well. Use a paper towel to turn off the faucet and open the door.  Using alcohol-based hand   Alcohol-based hand  are also a good choice. Use them when you can't use soap and water. Follow these steps:  · Squeeze about a tablespoon of gel into the palm of one hand.  · Rub your hands together briskly, cleaning the backs of your hands, the palms, between your fingers, and up the wrists.  · Rub until the gel is gone and your hands are completely dry.  Preventing the flu in healthcare settings  The flu  is a special concern for people in hospitals and long-term care facilities. To help prevent the spread of flu, many hospitals and nursing homes take these steps:  · Healthcare providers wash their hands or use an alcohol-based hand  before and after treating each patient.  · People with the flu have private rooms and bathrooms or share a room with someone with the same infection.  · People who are at high risk for the flu but don't have it are encouraged to get the flu and pneumonia vaccines.  · All healthcare workers are encouraged or required to get flu shots.   Date Last Reviewed: 12/1/2016  © 3368-4833 [employer]. [address] [place] [address], [place] [address]. All rights reserved. This information is not intended as a substitute for professional medical care. Always follow your healthcare professional's instructions.

## 2020-07-09 ENCOUNTER — OFFICE VISIT (OUTPATIENT)
Dept: URGENT CARE | Facility: CLINIC | Age: 35
End: 2020-07-09
Payer: COMMERCIAL

## 2020-07-09 VITALS
TEMPERATURE: 96 F | DIASTOLIC BLOOD PRESSURE: 81 MMHG | SYSTOLIC BLOOD PRESSURE: 120 MMHG | HEIGHT: 72 IN | BODY MASS INDEX: 33.86 KG/M2 | RESPIRATION RATE: 20 BRPM | OXYGEN SATURATION: 98 % | HEART RATE: 91 BPM | WEIGHT: 250 LBS

## 2020-07-09 DIAGNOSIS — J02.9 SORE THROAT: ICD-10-CM

## 2020-07-09 DIAGNOSIS — Z20.822 CLOSE EXPOSURE TO COVID-19 VIRUS: Primary | ICD-10-CM

## 2020-07-09 DIAGNOSIS — R07.89 CHEST TIGHTNESS: ICD-10-CM

## 2020-07-09 PROCEDURE — 99214 PR OFFICE/OUTPT VISIT, EST, LEVL IV, 30-39 MIN: ICD-10-PCS | Mod: S$GLB,,, | Performed by: NURSE PRACTITIONER

## 2020-07-09 PROCEDURE — 99214 OFFICE O/P EST MOD 30 MIN: CPT | Mod: S$GLB,,, | Performed by: NURSE PRACTITIONER

## 2020-07-09 PROCEDURE — U0003 INFECTIOUS AGENT DETECTION BY NUCLEIC ACID (DNA OR RNA); SEVERE ACUTE RESPIRATORY SYNDROME CORONAVIRUS 2 (SARS-COV-2) (CORONAVIRUS DISEASE [COVID-19]), AMPLIFIED PROBE TECHNIQUE, MAKING USE OF HIGH THROUGHPUT TECHNOLOGIES AS DESCRIBED BY CMS-2020-01-R: HCPCS

## 2020-07-09 NOTE — PROGRESS NOTES
Subjective:       Patient ID: Nguyễn Gale Jr. is a 34 y.o. male.    Vitals:  height is 6' (1.829 m) and weight is 113.4 kg (250 lb). His temperature is 96.3 °F (35.7 °C). His blood pressure is 120/81 and his pulse is 91. His respiration is 20 and oxygen saturation is 98%.     Chief Complaint: COVID-19 Concerns    34 year old male c/o sore throat & chest discomfort.  He only wants to get COVID testing.     Sore Throat   This is a new problem. The current episode started in the past 7 days. The problem has been unchanged. The pain is worse on the right side. There has been no fever. The pain is at a severity of 3/10. The pain is mild. Associated symptoms include swollen glands. Pertinent negatives include no abdominal pain, congestion, coughing, diarrhea, drooling, ear discharge, ear pain, headaches, hoarse voice, plugged ear sensation, neck pain, shortness of breath, stridor, trouble swallowing or vomiting. He has had no exposure to strep or mono. He has tried nothing for the symptoms.       Constitution: Negative for chills, sweating, fatigue and fever.   HENT: Positive for sore throat. Negative for ear pain, ear discharge, drooling, congestion, sinus pain, sinus pressure, trouble swallowing and voice change.    Neck: Negative for neck pain and painful lymph nodes.   Cardiovascular: Negative for chest trauma and leg swelling.   Eyes: Negative for eye redness.   Respiratory: Positive for chest tightness. Negative for sleep apnea, cough, sputum production, bloody sputum, COPD, shortness of breath, stridor, wheezing and asthma.    Gastrointestinal: Negative for abdominal pain, nausea, vomiting, diarrhea and heartburn.   Musculoskeletal: Negative for pain with walking and muscle ache.   Skin: Negative for rash.   Allergic/Immunologic: Negative for seasonal allergies and asthma.   Neurological: Negative for light-headedness, passing out and headaches.   Hematologic/Lymphatic: Negative for swollen lymph nodes and  history of blood clots.   Psychiatric/Behavioral: Negative for nervous/anxious. The patient is not nervous/anxious.        Objective:      Physical Exam   Constitutional: He is oriented to person, place, and time. He appears well-developed. He is cooperative.  Non-toxic appearance. He does not appear ill. No distress.   HENT:   Head: Normocephalic and atraumatic.   Right Ear: Hearing, tympanic membrane, external ear and ear canal normal.   Left Ear: Hearing, tympanic membrane, external ear and ear canal normal.   Nose: Nose normal. No mucosal edema, rhinorrhea or nasal deformity. No epistaxis. Right sinus exhibits no maxillary sinus tenderness and no frontal sinus tenderness. Left sinus exhibits no maxillary sinus tenderness and no frontal sinus tenderness.   Mouth/Throat: Uvula is midline, oropharynx is clear and moist and mucous membranes are normal. No trismus in the jaw. Normal dentition. No uvula swelling. No oropharyngeal exudate, posterior oropharyngeal edema or posterior oropharyngeal erythema.   Eyes: Conjunctivae and lids are normal. No scleral icterus.   Neck: Trachea normal, full passive range of motion without pain and phonation normal. Neck supple. No neck rigidity. No edema and no erythema present.   Cardiovascular: Normal rate, regular rhythm, normal heart sounds and normal pulses.   Pulmonary/Chest: Effort normal and breath sounds normal. No respiratory distress. He has no decreased breath sounds. He has no rhonchi.   Lung clear to ascultation     Comments: Lung clear to ascultation     Abdominal: Normal appearance.   Musculoskeletal: Normal range of motion.         General: No deformity.   Neurological: He is alert and oriented to person, place, and time. He exhibits normal muscle tone. Coordination normal.   Skin: Skin is warm, dry, intact, not diaphoretic and not pale.   Psychiatric: His speech is normal and behavior is normal. Judgment and thought content normal.   Nursing note and vitals  reviewed.        Assessment:       1. Close Exposure to Covid-19 Virus    2. Sore throat    3. Chest tightness        Plan:         Close Exposure to Covid-19 Virus    Sore throat  -     COVID-19 Routine Screening    Chest tightness      Patient Instructions   Increase fluid intake.     Use ursula vapor rub to help with chest congestion.     Tylenol or ibuprofen for minor pain.    Refrain from smoking as it could exacerbate symptoms.   Instructions for Patients with Confirmed or Suspected COVID-19    If you are awaiting your test result, you will either be called or it will be released to the patient portal.  If you have any questions about your test, please visit [email or web address] or call our COVID-19 information line at [phone].      Preventing the Spread of Coronavirus Disease 2019 (COVID-19) in Homes and Residential Communities -- Patients     Prevention steps for people with confirmed or suspected COVID-19 (including persons under investigation) who do not need to be hospitalized and people with confirmed COVID-19 who were hospitalized and determined to be medically stable to go home.      Stay home except to get medical care.    Separate yourself from other people and animals in your home.    Call ahead before visiting your doctor.    Wear a face mask.    Cover your coughs and sneezes.    Clean your hands often.    Avoid sharing personal household items.    Clean all high-touch surfaces every day.    Monitor your symptoms. Seek prompt medical attention if your illness is worsening (e.g., difficulty breathing). Before seeking care, call your healthcare provider.    If you have a medical emergency and must call 911, notify the dispatcher that you have or are being evaluated for COVID-19. If possible, put on a face mask before emergency medical services arrive.    Use the following symptom-based strategy to return to normal activity following a suspected or confirmed case of COVID-19.  Continue isolation until:   o At least 3 days (72 hours) have passed since recovery defined as resolution of fever without the use of fever-reducing medications and improvement in respiratory symptoms (e.g. cough, shortness of breath), and   o At least 10 days have passed since symptoms first appeared.     Precautions for household members, intimate partners and caregivers in a non-healthcare setting of a patient with symptomatic laboratory-confirmed COVID-19 or a patient under investigation.     Household members, intimate partners and caregivers in a non-healthcare setting may have close contact with a person with symptomatic, laboratory-confirmed COVID-19 or a person under investigation. Close contacts should monitor their health; they should call their healthcare provider right away if they develop symptoms suggestive of COVID-19 (e.g., fever, cough, shortness of breath). Close contacts should also follow these recommendations:     · Stay home for the duration of the time recommended by healthcare provider, except to get medical care. Separate yourself from other people and animals in the home.  · Monitor the patients symptoms. If the patient is getting sicker, call his or her healthcare provider. If the patient has a medical emergency and you need to call 911, notify the dispatch personnel that the patient has or is being evaluated for COVID-19.   · Wear a facemask when around other people such as sharing a room or vehicle and before entering a healthcare provider's office.  · Cover coughs and sneezes with a tissue. Throw used tissues in a lined trash can immediately and wash hands.  · Clean hands often with soap and water for at least 20 seconds or with an alcohol-based hand , rubbing hands together until they feel dry. Avoid touching your eyes, nose, and mouth with unwashed hands.  · Clean all high-touch; surfaces every day, including counters, tabletops, doorknobs, bathroom fixtures, toilets,  phones, keyboards, tablets, bedside tables, etc. Use a household cleaning spray or wipe according to label instructions.  · Avoid sharing personal household items such as dishes, drinking glasses, cups, towels, bedding, etc. After these items are used, they should be washed thoroughly with soap and water.  · Use the following symptom-based strategy to return to normal activity following a suspected or confirmed case of COVID-19. Continue isolation until:   · At least 3 days (72 hours) have passed since recovery defined as resolution of fever without the use of fever-reducing medications and improvement in respiratory symptoms (e.g. cough, shortness of breath), and   · At least 10 days have passed since symptoms first appeared.

## 2020-07-09 NOTE — PATIENT INSTRUCTIONS
Increase fluid intake.     Use ursula vapor rub to help with chest congestion.     Tylenol or ibuprofen for minor pain.    Refrain from smoking as it could exacerbate symptoms.   Instructions for Patients with Confirmed or Suspected COVID-19    If you are awaiting your test result, you will either be called or it will be released to the patient portal.  If you have any questions about your test, please visit [email or web address] or call our COVID-19 information line at [phone].      Preventing the Spread of Coronavirus Disease 2019 (COVID-19) in Homes and Residential Communities -- Patients     Prevention steps for people with confirmed or suspected COVID-19 (including persons under investigation) who do not need to be hospitalized and people with confirmed COVID-19 who were hospitalized and determined to be medically stable to go home.      Stay home except to get medical care.    Separate yourself from other people and animals in your home.    Call ahead before visiting your doctor.    Wear a face mask.    Cover your coughs and sneezes.    Clean your hands often.    Avoid sharing personal household items.    Clean all high-touch surfaces every day.    Monitor your symptoms. Seek prompt medical attention if your illness is worsening (e.g., difficulty breathing). Before seeking care, call your healthcare provider.    If you have a medical emergency and must call 911, notify the dispatcher that you have or are being evaluated for COVID-19. If possible, put on a face mask before emergency medical services arrive.    Use the following symptom-based strategy to return to normal activity following a suspected or confirmed case of COVID-19. Continue isolation until:   o At least 3 days (72 hours) have passed since recovery defined as resolution of fever without the use of fever-reducing medications and improvement in respiratory symptoms (e.g. cough, shortness of breath), and   o At least 10 days  have passed since symptoms first appeared.     Precautions for household members, intimate partners and caregivers in a non-healthcare setting of a patient with symptomatic laboratory-confirmed COVID-19 or a patient under investigation.     Household members, intimate partners and caregivers in a non-healthcare setting may have close contact with a person with symptomatic, laboratory-confirmed COVID-19 or a person under investigation. Close contacts should monitor their health; they should call their healthcare provider right away if they develop symptoms suggestive of COVID-19 (e.g., fever, cough, shortness of breath). Close contacts should also follow these recommendations:     · Stay home for the duration of the time recommended by healthcare provider, except to get medical care. Separate yourself from other people and animals in the home.  · Monitor the patients symptoms. If the patient is getting sicker, call his or her healthcare provider. If the patient has a medical emergency and you need to call 911, notify the dispatch personnel that the patient has or is being evaluated for COVID-19.   · Wear a facemask when around other people such as sharing a room or vehicle and before entering a healthcare provider's office.  · Cover coughs and sneezes with a tissue. Throw used tissues in a lined trash can immediately and wash hands.  · Clean hands often with soap and water for at least 20 seconds or with an alcohol-based hand , rubbing hands together until they feel dry. Avoid touching your eyes, nose, and mouth with unwashed hands.  · Clean all high-touch; surfaces every day, including counters, tabletops, doorknobs, bathroom fixtures, toilets, phones, keyboards, tablets, bedside tables, etc. Use a household cleaning spray or wipe according to label instructions.  · Avoid sharing personal household items such as dishes, drinking glasses, cups, towels, bedding, etc. After these items are used, they should  be washed thoroughly with soap and water.  · Use the following symptom-based strategy to return to normal activity following a suspected or confirmed case of COVID-19. Continue isolation until:   · At least 3 days (72 hours) have passed since recovery defined as resolution of fever without the use of fever-reducing medications and improvement in respiratory symptoms (e.g. cough, shortness of breath), and   · At least 10 days have passed since symptoms first appeared.

## 2020-07-11 LAB — SARS-COV-2 RNA RESP QL NAA+PROBE: NOT DETECTED

## 2020-07-14 ENCOUNTER — OFFICE VISIT (OUTPATIENT)
Dept: PRIMARY CARE CLINIC | Facility: CLINIC | Age: 35
End: 2020-07-14
Payer: COMMERCIAL

## 2020-07-14 VITALS — TEMPERATURE: 98 F | HEART RATE: 86 BPM | OXYGEN SATURATION: 98 %

## 2020-07-14 DIAGNOSIS — B35.4 TINEA CORPORIS: Primary | ICD-10-CM

## 2020-07-14 DIAGNOSIS — J02.9 ACUTE PHARYNGITIS, UNSPECIFIED ETIOLOGY: ICD-10-CM

## 2020-07-14 PROCEDURE — 99213 PR OFFICE/OUTPT VISIT, EST, LEVL III, 20-29 MIN: ICD-10-PCS | Mod: S$GLB,,, | Performed by: INTERNAL MEDICINE

## 2020-07-14 PROCEDURE — 99213 OFFICE O/P EST LOW 20 MIN: CPT | Mod: S$GLB,,, | Performed by: INTERNAL MEDICINE

## 2020-07-14 PROCEDURE — 99999 PR PBB SHADOW E&M-EST. PATIENT-LVL III: CPT | Mod: PBBFAC,,, | Performed by: INTERNAL MEDICINE

## 2020-07-14 PROCEDURE — 99999 PR PBB SHADOW E&M-EST. PATIENT-LVL III: ICD-10-PCS | Mod: PBBFAC,,, | Performed by: INTERNAL MEDICINE

## 2020-07-14 RX ORDER — CLOTRIMAZOLE AND BETAMETHASONE DIPROPIONATE 10; .64 MG/G; MG/G
CREAM TOPICAL 2 TIMES DAILY
Qty: 15 G | Refills: 1 | Status: SHIPPED | OUTPATIENT
Start: 2020-07-14 | End: 2021-05-26

## 2020-07-14 NOTE — PROGRESS NOTES
"Subjective:       Patient ID: Nguyễn Gale Jr. is a 34 y.o. male.    Chief Complaint: Rash and Sore Throat    Patient of Dr. Munoz scheduled urgently for complaint of "quarter-sized rash on arm x 2 months". Describes a small patch of redness on ventral left forearm that is itchy but not painful, little change since onset. Never formed blisters, open sores, swelling or drainage. No generalized spread. No history of chronic skin problems. Works as an . Has used topical hydrocortisone cream x days with no improvement, and topical antibacterial ointment x days with no improvement. No antifungal treatment yet.     Second complaint of sore throat x one week for which he was evaluated by Urgent Care five days ago - exam unremarkable. No associated fever, chills, sweats, headache, body aches. Mild rhinitis, no cough, chest pain, SOB, N/V or diarrhea. Admits he did not really have chest tightness reported at that time. And did not have "close" contact with a COVID case, it was his next door neighbor whom he had no direct contact with. He just wanted a COVID swab which was negative on 7/9/20.     Past history, meds and allergies reviewed.    Review of Systems   HENT: Positive for sneezing and sore throat. Negative for nasal congestion, facial swelling, sinus pressure/congestion, trouble swallowing and voice change.    Respiratory: Negative for cough, choking, chest tightness, shortness of breath, wheezing and stridor.          Objective:    Temp 98, O2 Sat 98%, Pulse 86  Physical Exam  Constitutional:       General: He is not in acute distress.     Appearance: Normal appearance. He is not ill-appearing.   HENT:      Mouth/Throat:      Mouth: Mucous membranes are moist.      Comments: Mild injection of oropharynx with no tonsillar edema or swelling, no exudate or heavy postnasal drip noted.   Skin:     Comments: 1.5 cm diameter area of fine erythematous papules on ventral left forearm near the wrist. Does not " have typical central clearing or advancing border. No vesicles, induration, open sore, fluctuance or drainage. Skin otherwise healthy.   Neurological:      Mental Status: He is alert.         Assessment:       1. Tinea corporis    2. Acute pharyngitis, unspecified etiology        Plan:       Tinea corporis  -     clotrimazole-betamethasone 1-0.05% (LOTRISONE) cream; Apply topically 2 (two) times daily.  Dispense: 15 g; Refill: 1    Acute pharyngitis, unspecified etiology        -     Associated with mild rhinitis - OTC Antihistamine and NSAID recommended (Allegra and Ibuprofen).

## 2020-12-09 ENCOUNTER — CLINICAL SUPPORT (OUTPATIENT)
Dept: URGENT CARE | Facility: CLINIC | Age: 35
End: 2020-12-09
Payer: COMMERCIAL

## 2020-12-09 DIAGNOSIS — Z20.822 EXPOSURE TO COVID-19 VIRUS: Primary | ICD-10-CM

## 2020-12-09 LAB
CTP QC/QA: YES
SARS-COV-2 RDRP RESP QL NAA+PROBE: NEGATIVE

## 2020-12-09 PROCEDURE — U0002 COVID-19 LAB TEST NON-CDC: HCPCS | Mod: QW,S$GLB,, | Performed by: NURSE PRACTITIONER

## 2020-12-09 PROCEDURE — U0002: ICD-10-PCS | Mod: QW,S$GLB,, | Performed by: NURSE PRACTITIONER

## 2021-03-31 ENCOUNTER — IMMUNIZATION (OUTPATIENT)
Dept: PRIMARY CARE CLINIC | Facility: CLINIC | Age: 36
End: 2021-03-31
Payer: COMMERCIAL

## 2021-03-31 DIAGNOSIS — Z23 NEED FOR VACCINATION: Primary | ICD-10-CM

## 2021-03-31 PROCEDURE — 91300 COVID-19, MRNA, LNP-S, PF, 30 MCG/0.3 ML DOSE VACCINE: CPT | Mod: PBBFAC | Performed by: EMERGENCY MEDICINE

## 2021-04-21 ENCOUNTER — IMMUNIZATION (OUTPATIENT)
Dept: PRIMARY CARE CLINIC | Facility: CLINIC | Age: 36
End: 2021-04-21
Payer: COMMERCIAL

## 2021-04-21 DIAGNOSIS — Z23 NEED FOR VACCINATION: Primary | ICD-10-CM

## 2021-04-21 PROCEDURE — 91300 COVID-19, MRNA, LNP-S, PF, 30 MCG/0.3 ML DOSE VACCINE: CPT | Mod: PBBFAC | Performed by: EMERGENCY MEDICINE

## 2021-04-21 PROCEDURE — 0002A COVID-19, MRNA, LNP-S, PF, 30 MCG/0.3 ML DOSE VACCINE: CPT | Mod: PBBFAC | Performed by: EMERGENCY MEDICINE

## 2021-05-26 ENCOUNTER — OFFICE VISIT (OUTPATIENT)
Dept: DERMATOLOGY | Facility: CLINIC | Age: 36
End: 2021-05-26
Payer: COMMERCIAL

## 2021-05-26 DIAGNOSIS — L72.0 EIC (EPIDERMAL INCLUSION CYST): Primary | ICD-10-CM

## 2021-05-26 PROCEDURE — 1126F AMNT PAIN NOTED NONE PRSNT: CPT | Mod: S$GLB,,, | Performed by: DERMATOLOGY

## 2021-05-26 PROCEDURE — 99203 PR OFFICE/OUTPT VISIT, NEW, LEVL III, 30-44 MIN: ICD-10-PCS | Mod: S$GLB,,, | Performed by: DERMATOLOGY

## 2021-05-26 PROCEDURE — 1126F PR PAIN SEVERITY QUANTIFIED, NO PAIN PRESENT: ICD-10-PCS | Mod: S$GLB,,, | Performed by: DERMATOLOGY

## 2021-05-26 PROCEDURE — 99999 PR PBB SHADOW E&M-EST. PATIENT-LVL II: CPT | Mod: PBBFAC,,, | Performed by: DERMATOLOGY

## 2021-05-26 PROCEDURE — 99203 OFFICE O/P NEW LOW 30 MIN: CPT | Mod: S$GLB,,, | Performed by: DERMATOLOGY

## 2021-05-26 PROCEDURE — 99999 PR PBB SHADOW E&M-EST. PATIENT-LVL II: ICD-10-PCS | Mod: PBBFAC,,, | Performed by: DERMATOLOGY

## 2021-05-26 RX ORDER — OMEPRAZOLE 20 MG/1
20 TABLET, DELAYED RELEASE ORAL DAILY
COMMUNITY
End: 2022-03-31

## 2021-07-26 NOTE — LETTER
February 16, 2017      Rosa Clinton MD  [address] [facility]  [address]  [facility] [address]           [facility]  [address] [place] [address]  Phone: [phone]          Patient: Nguyễn Gale Jr.   MR Number: [id number]   YOB: 1985   Date of Visit: 2/16/2017       Dear Dr. Rosa Clinton:    Thank you for referring Nguyễn Gale to me for evaluation. Attached you will find relevant portions of my assessment and plan of care.    If you have questions, please do not hesitate to call me. I look forward to following Nguyễn Gale along with you.    Sincerely,    Adamaris Cohen DPM    Enclosure  CC:  No Recipients    If you would like to receive this communication electronically, please contact [email or web address] or [phone] to request more information on [employer] access.    For providers and/or their staff who would like to refer a patient to [facility], please contact us through our one-stop-shop provider referral line, [facility] , at [phone].    If you feel you have received this communication in error or would no longer like to receive these types of communications, please e-mail [email or web address]          No

## 2021-10-05 ENCOUNTER — PATIENT MESSAGE (OUTPATIENT)
Dept: ADMINISTRATIVE | Facility: HOSPITAL | Age: 36
End: 2021-10-05

## 2021-11-19 ENCOUNTER — IMMUNIZATION (OUTPATIENT)
Dept: PRIMARY CARE CLINIC | Facility: CLINIC | Age: 36
End: 2021-11-19
Payer: COMMERCIAL

## 2021-11-19 DIAGNOSIS — Z23 NEED FOR VACCINATION: Primary | ICD-10-CM

## 2021-11-19 PROCEDURE — 0003A COVID-19, MRNA, LNP-S, PF, 30 MCG/0.3 ML DOSE VACCINE: CPT | Mod: PBBFAC | Performed by: FAMILY MEDICINE

## 2021-11-19 PROCEDURE — 91300 COVID-19, MRNA, LNP-S, PF, 30 MCG/0.3 ML DOSE VACCINE: CPT | Mod: PBBFAC | Performed by: FAMILY MEDICINE

## 2022-01-21 ENCOUNTER — OFFICE VISIT (OUTPATIENT)
Dept: PRIMARY CARE CLINIC | Facility: CLINIC | Age: 37
End: 2022-01-21
Payer: COMMERCIAL

## 2022-01-21 DIAGNOSIS — F43.29 ADJUSTMENT REACTION WITH MIXED EMOTIONAL FEATURES: Primary | ICD-10-CM

## 2022-01-21 PROCEDURE — 99213 OFFICE O/P EST LOW 20 MIN: CPT | Mod: 95,,, | Performed by: FAMILY MEDICINE

## 2022-01-21 PROCEDURE — 1159F MED LIST DOCD IN RCRD: CPT | Mod: CPTII,95,, | Performed by: FAMILY MEDICINE

## 2022-01-21 PROCEDURE — 1159F PR MEDICATION LIST DOCUMENTED IN MEDICAL RECORD: ICD-10-PCS | Mod: CPTII,95,, | Performed by: FAMILY MEDICINE

## 2022-01-21 PROCEDURE — 1160F PR REVIEW ALL MEDS BY PRESCRIBER/CLIN PHARMACIST DOCUMENTED: ICD-10-PCS | Mod: CPTII,95,, | Performed by: FAMILY MEDICINE

## 2022-01-21 PROCEDURE — 1160F RVW MEDS BY RX/DR IN RCRD: CPT | Mod: CPTII,95,, | Performed by: FAMILY MEDICINE

## 2022-01-21 PROCEDURE — 99213 PR OFFICE/OUTPT VISIT, EST, LEVL III, 20-29 MIN: ICD-10-PCS | Mod: 95,,, | Performed by: FAMILY MEDICINE

## 2022-01-21 RX ORDER — FLUOXETINE 10 MG/1
10 CAPSULE ORAL DAILY
Qty: 30 CAPSULE | Refills: 1 | Status: SHIPPED | OUTPATIENT
Start: 2022-01-21 | End: 2022-02-16

## 2022-01-21 NOTE — PROGRESS NOTES
"Subjective:   Patient ID: Nguyễn Gale Jr. is a 36 y.o. male.    Chief Complaint: No chief complaint on file.      HPI  The patient location is: [place]  The chief complaint leading to consultation is: Stress    Visit type: audiovisual    Face to Face time with patient:        minutes of total time spent on the encounter, which includes face to face time and non-face to face time preparing to see the patient (eg, review of tests), Obtaining and/or reviewing separately obtained history, Documenting clinical information in the electronic or other health record, Independently interpreting results (not separately reported) and communicating results to the patient/family/caregiver, or Care coordination (not separately reported).         Each patient to whom he or she provides medical services by telemedicine is:  (1) informed of the relationship between the physician and patient and the respective role of any other health care provider with respect to management of the patient; and (2) notified that he or she may decline to receive medical services by telemedicine and may withdraw from such care at any time.    Notes:         Getting  soon and feels "overwhelmed and anxious" as well as depressed symptoms.  No suicidal homicidal ideations.  Sleep is ok  Smokes  Drinks 2-3 times to week    Sometimes a bit more 6-7 drinks    Cut back on alcohol.  Stop smoking.  May not be the best time to start this over the next week with him getting  next week but start soon.  Fluoxetine 10 mg daily.  Referral to Psychiatry and Psychology    Follow-up with me in 6 weeks for annual exam including labs.                Patient queried and denies any further complaints.    ALLERGIES AND MEDICATIONS: updated and reviewed.  Review of patient's allergies indicates:   Allergen Reactions    Plantain (jaime)      Butterfly vine       Current Outpatient Medications:     FLUoxetine 10 MG capsule, Take 1 capsule (10 mg total) by " mouth once daily., Disp: 30 capsule, Rfl: 1    omeprazole (PRILOSEC OTC) 20 MG tablet, Take 20 mg by mouth once daily., Disp: , Rfl:     Review of Systems   Constitutional: Negative for activity change, appetite change, chills, diaphoresis, fatigue, fever and unexpected weight change.   HENT: Negative for congestion, ear discharge, ear pain, facial swelling, hearing loss, nosebleeds, postnasal drip, rhinorrhea, sinus pressure, sinus pain, sneezing, sore throat, tinnitus, trouble swallowing and voice change.    Eyes: Negative for photophobia, pain, discharge, redness, itching and visual disturbance.   Respiratory: Negative for cough, chest tightness, shortness of breath and wheezing.    Cardiovascular: Negative for chest pain, palpitations and leg swelling.   Gastrointestinal: Negative for abdominal distention, abdominal pain, anal bleeding, blood in stool, constipation, diarrhea, nausea, rectal pain and vomiting.   Endocrine: Negative for cold intolerance, heat intolerance, polydipsia, polyphagia and polyuria.   Genitourinary: Negative for difficulty urinating, dysuria, flank pain and hematuria.   Musculoskeletal: Negative for arthralgias, back pain, joint swelling, myalgias and neck pain.   Skin: Negative for rash.   Neurological: Negative for dizziness, tremors, seizures, syncope, speech difficulty, weakness, light-headedness, numbness and headaches.   Psychiatric/Behavioral: Positive for dysphoric mood. Negative for behavioral problems, confusion, decreased concentration, sleep disturbance and suicidal ideas. The patient is nervous/anxious. The patient is not hyperactive.        Lab Results   Component Value Date    WBC 6.59 07/24/2019    HGB 16.2 07/24/2019    HCT 48.4 07/24/2019    MCV 97 07/24/2019     07/24/2019         CMP  Sodium   Date Value Ref Range Status   07/24/2019 140 136 - 145 mmol/L Final     Potassium   Date Value Ref Range Status   07/24/2019 4.4 3.5 - 5.1 mmol/L Final     Chloride    Date Value Ref Range Status   07/24/2019 104 95 - 110 mmol/L Final     CO2   Date Value Ref Range Status   07/24/2019 27 23 - 29 mmol/L Final     Glucose   Date Value Ref Range Status   07/24/2019 89 70 - 110 mg/dL Final     BUN   Date Value Ref Range Status   07/24/2019 9 6 - 20 mg/dL Final     Creatinine   Date Value Ref Range Status   07/24/2019 0.9 0.5 - 1.4 mg/dL Final     Calcium   Date Value Ref Range Status   07/24/2019 9.7 8.7 - 10.5 mg/dL Final     Total Protein   Date Value Ref Range Status   07/24/2019 7.2 6.0 - 8.4 g/dL Final     Albumin   Date Value Ref Range Status   07/24/2019 4.0 3.5 - 5.2 g/dL Final     Total Bilirubin   Date Value Ref Range Status   07/24/2019 0.4 0.1 - 1.0 mg/dL Final     Comment:     For infants and newborns, interpretation of results should be based  on gestational age, weight and in agreement with clinical  observations.  Premature Infant recommended reference ranges:  Up to 24 hours.............<8.0 mg/dL  Up to 48 hours............<12.0 mg/dL  3-5 days..................<15.0 mg/dL  6-29 days.................<15.0 mg/dL       Alkaline Phosphatase   Date Value Ref Range Status   07/24/2019 83 55 - 135 U/L Final     AST   Date Value Ref Range Status   07/24/2019 23 10 - 40 U/L Final     ALT   Date Value Ref Range Status   07/24/2019 25 10 - 44 U/L Final     Anion Gap   Date Value Ref Range Status   07/24/2019 9 8 - 16 mmol/L Final     eGFR if    Date Value Ref Range Status   07/24/2019 >60.0 >60 mL/min/1.73 m^2 Final     eGFR if non    Date Value Ref Range Status   07/24/2019 >60.0 >60 mL/min/1.73 m^2 Final     Comment:     Calculation used to obtain the estimated glomerular filtration  rate (eGFR) is the CKD-EPI equation.           Lab Results   Component Value Date    HGBA1C 5.5 02/07/2017        Objective:   There were no vitals filed for this visit.  There is no height or weight on file to calculate BMI.    Physical Exam  Constitutional:        Appearance: Normal appearance.   Neurological:      Mental Status: He is alert.   Psychiatric:         Mood and Affect: Mood normal.         Behavior: Behavior normal.         Assessment and Plan:   Diagnoses and all orders for this visit:    Adjustment reaction with mixed emotional features  -     Ambulatory referral/consult to Psychology; Future  -     Ambulatory referral/consult to Psychiatry; Future    Other orders  -     FLUoxetine 10 MG capsule; Take 1 capsule (10 mg total) by mouth once daily.        No follow-ups on file.    THIS NOTE WILL BE SHARED WITH THE PATIENT.

## 2022-02-17 ENCOUNTER — OFFICE VISIT (OUTPATIENT)
Dept: PRIMARY CARE CLINIC | Facility: CLINIC | Age: 37
End: 2022-02-17
Payer: COMMERCIAL

## 2022-02-17 DIAGNOSIS — F43.29 ADJUSTMENT REACTION WITH MIXED EMOTIONAL FEATURES: ICD-10-CM

## 2022-02-17 DIAGNOSIS — J20.9 ACUTE BRONCHITIS, UNSPECIFIED ORGANISM: Primary | ICD-10-CM

## 2022-02-17 PROCEDURE — 3079F PR MOST RECENT DIASTOLIC BLOOD PRESSURE 80-89 MM HG: ICD-10-PCS | Mod: CPTII,S$GLB,, | Performed by: FAMILY MEDICINE

## 2022-02-17 PROCEDURE — 1160F PR REVIEW ALL MEDS BY PRESCRIBER/CLIN PHARMACIST DOCUMENTED: ICD-10-PCS | Mod: CPTII,S$GLB,, | Performed by: FAMILY MEDICINE

## 2022-02-17 PROCEDURE — 3075F SYST BP GE 130 - 139MM HG: CPT | Mod: CPTII,S$GLB,, | Performed by: FAMILY MEDICINE

## 2022-02-17 PROCEDURE — 99999 PR PBB SHADOW E&M-EST. PATIENT-LVL IV: ICD-10-PCS | Mod: PBBFAC,,, | Performed by: FAMILY MEDICINE

## 2022-02-17 PROCEDURE — 3079F DIAST BP 80-89 MM HG: CPT | Mod: CPTII,S$GLB,, | Performed by: FAMILY MEDICINE

## 2022-02-17 PROCEDURE — 99213 PR OFFICE/OUTPT VISIT, EST, LEVL III, 20-29 MIN: ICD-10-PCS | Mod: S$GLB,,, | Performed by: FAMILY MEDICINE

## 2022-02-17 PROCEDURE — 3008F BODY MASS INDEX DOCD: CPT | Mod: CPTII,S$GLB,, | Performed by: FAMILY MEDICINE

## 2022-02-17 PROCEDURE — 1159F MED LIST DOCD IN RCRD: CPT | Mod: CPTII,S$GLB,, | Performed by: FAMILY MEDICINE

## 2022-02-17 PROCEDURE — 99999 PR PBB SHADOW E&M-EST. PATIENT-LVL IV: CPT | Mod: PBBFAC,,, | Performed by: FAMILY MEDICINE

## 2022-02-17 PROCEDURE — 1159F PR MEDICATION LIST DOCUMENTED IN MEDICAL RECORD: ICD-10-PCS | Mod: CPTII,S$GLB,, | Performed by: FAMILY MEDICINE

## 2022-02-17 PROCEDURE — 99213 OFFICE O/P EST LOW 20 MIN: CPT | Mod: S$GLB,,, | Performed by: FAMILY MEDICINE

## 2022-02-17 PROCEDURE — 3008F PR BODY MASS INDEX (BMI) DOCUMENTED: ICD-10-PCS | Mod: CPTII,S$GLB,, | Performed by: FAMILY MEDICINE

## 2022-02-17 PROCEDURE — 1160F RVW MEDS BY RX/DR IN RCRD: CPT | Mod: CPTII,S$GLB,, | Performed by: FAMILY MEDICINE

## 2022-02-17 PROCEDURE — 3075F PR MOST RECENT SYSTOLIC BLOOD PRESS GE 130-139MM HG: ICD-10-PCS | Mod: CPTII,S$GLB,, | Performed by: FAMILY MEDICINE

## 2022-02-17 RX ORDER — AZITHROMYCIN 250 MG/1
TABLET, FILM COATED ORAL
Qty: 6 TABLET | Refills: 0 | Status: SHIPPED | OUTPATIENT
Start: 2022-02-17 | End: 2022-02-22

## 2022-02-17 RX ORDER — PROMETHAZINE HYDROCHLORIDE AND DEXTROMETHORPHAN HYDROBROMIDE 6.25; 15 MG/5ML; MG/5ML
5 SYRUP ORAL NIGHTLY PRN
Qty: 240 ML | Refills: 1 | Status: SHIPPED | OUTPATIENT
Start: 2022-02-17 | End: 2022-03-31

## 2022-02-17 RX ORDER — METHYLPREDNISOLONE 4 MG/1
TABLET ORAL
Qty: 21 EACH | Refills: 0 | Status: SHIPPED | OUTPATIENT
Start: 2022-02-17 | End: 2022-03-10

## 2022-02-17 RX ORDER — ALBUTEROL SULFATE 90 UG/1
2 AEROSOL, METERED RESPIRATORY (INHALATION) EVERY 6 HOURS PRN
Qty: 18 G | Refills: 1 | Status: SHIPPED | OUTPATIENT
Start: 2022-02-17 | End: 2022-07-11

## 2022-02-17 NOTE — PROGRESS NOTES
Subjective:   Patient ID: Nguyễn Gale Jr. is a 36 y.o. male.    Chief Complaint: Cough      HPI  36-year-old male with productive cough for  X 2-3 wks.  Had negative COVID test at home    Nonsmoker          Patient queried and denies any further complaints.    ALLERGIES AND MEDICATIONS: updated and reviewed.  Review of patient's allergies indicates:   Allergen Reactions    Plantain (jaime)      Butterfly vine       Current Outpatient Medications:     FLUoxetine 10 MG capsule, TAKE 1 CAPSULE BY MOUTH DAILY, Disp: 90 capsule, Rfl: 3    omeprazole (PRILOSEC OTC) 20 MG tablet, Take 20 mg by mouth once daily., Disp: , Rfl:     albuterol (PROVENTIL/VENTOLIN HFA) 90 mcg/actuation inhaler, Inhale 2 puffs into the lungs every 6 (six) hours as needed for Wheezing or Shortness of Breath., Disp: 18 g, Rfl: 1    azithromycin (Z-DENIA) 250 MG tablet, Take 2 tablets by mouth on day 1; Take 1 tablet by mouth on days 2-5, Disp: 6 tablet, Rfl: 0    methylPREDNISolone (MEDROL DOSEPACK) 4 mg tablet, use as directed, Disp: 21 each, Rfl: 0    promethazine-dextromethorphan (PROMETHAZINE-DM) 6.25-15 mg/5 mL Syrp, Take 5 mLs by mouth nightly as needed (cough)., Disp: 240 mL, Rfl: 1    Review of Systems   Constitutional: Negative for activity change, appetite change, chills, diaphoresis, fatigue, fever and unexpected weight change.   HENT: Positive for postnasal drip and sore throat. Negative for congestion, ear discharge, ear pain, facial swelling, hearing loss, nosebleeds, rhinorrhea, sinus pressure, sneezing, tinnitus, trouble swallowing and voice change.    Eyes: Negative for photophobia, pain, discharge, redness, itching and visual disturbance.   Respiratory: Positive for cough. Negative for chest tightness, shortness of breath and wheezing.    Cardiovascular: Negative for chest pain, palpitations and leg swelling.   Gastrointestinal: Negative for abdominal distention, abdominal pain, anal bleeding, blood in stool,  constipation, diarrhea, nausea, rectal pain and vomiting.   Endocrine: Negative for cold intolerance, heat intolerance, polydipsia, polyphagia and polyuria.   Genitourinary: Negative for difficulty urinating, dysuria and flank pain.   Musculoskeletal: Negative for arthralgias, back pain, joint swelling, myalgias and neck pain.   Skin: Negative for rash.   Allergic/Immunologic: Negative for environmental allergies.   Neurological: Positive for headaches. Negative for dizziness, tremors, seizures, syncope, speech difficulty, weakness, light-headedness and numbness.   Psychiatric/Behavioral: Negative for behavioral problems, confusion, decreased concentration, dysphoric mood, sleep disturbance and suicidal ideas. The patient is not nervous/anxious and is not hyperactive.        Lab Results   Component Value Date    WBC 6.59 07/24/2019    HGB 16.2 07/24/2019    HCT 48.4 07/24/2019    MCV 97 07/24/2019     07/24/2019         CMP  Sodium   Date Value Ref Range Status   07/24/2019 140 136 - 145 mmol/L Final     Potassium   Date Value Ref Range Status   07/24/2019 4.4 3.5 - 5.1 mmol/L Final     Chloride   Date Value Ref Range Status   07/24/2019 104 95 - 110 mmol/L Final     CO2   Date Value Ref Range Status   07/24/2019 27 23 - 29 mmol/L Final     Glucose   Date Value Ref Range Status   07/24/2019 89 70 - 110 mg/dL Final     BUN   Date Value Ref Range Status   07/24/2019 9 6 - 20 mg/dL Final     Creatinine   Date Value Ref Range Status   07/24/2019 0.9 0.5 - 1.4 mg/dL Final     Calcium   Date Value Ref Range Status   07/24/2019 9.7 8.7 - 10.5 mg/dL Final     Total Protein   Date Value Ref Range Status   07/24/2019 7.2 6.0 - 8.4 g/dL Final     Albumin   Date Value Ref Range Status   07/24/2019 4.0 3.5 - 5.2 g/dL Final     Total Bilirubin   Date Value Ref Range Status   07/24/2019 0.4 0.1 - 1.0 mg/dL Final     Comment:     For infants and newborns, interpretation of results should be based  on gestational age, weight  and in agreement with clinical  observations.  Premature Infant recommended reference ranges:  Up to 24 hours.............<8.0 mg/dL  Up to 48 hours............<12.0 mg/dL  3-5 days..................<15.0 mg/dL  6-29 days.................<15.0 mg/dL       Alkaline Phosphatase   Date Value Ref Range Status   07/24/2019 83 55 - 135 U/L Final     AST   Date Value Ref Range Status   07/24/2019 23 10 - 40 U/L Final     ALT   Date Value Ref Range Status   07/24/2019 25 10 - 44 U/L Final     Anion Gap   Date Value Ref Range Status   07/24/2019 9 8 - 16 mmol/L Final     eGFR if    Date Value Ref Range Status   07/24/2019 >60.0 >60 mL/min/1.73 m^2 Final     eGFR if non    Date Value Ref Range Status   07/24/2019 >60.0 >60 mL/min/1.73 m^2 Final     Comment:     Calculation used to obtain the estimated glomerular filtration  rate (eGFR) is the CKD-EPI equation.           Lab Results   Component Value Date    HGBA1C 5.5 02/07/2017        Objective:     Vitals:    02/17/22 0924   BP: (!) 136/98   Pulse: 94   Temp: 98.2 °F (36.8 °C)   TempSrc: Oral   SpO2: 98%   Weight: 112.8 kg (248 lb 10.9 oz)   Height: 6' (1.829 m)   PainSc: 0-No pain     Body mass index is 33.73 kg/m².    Physical Exam  Vitals and nursing note reviewed.   Constitutional:       Appearance: Normal appearance. He is well-developed and well-nourished.   HENT:      Head: Normocephalic and atraumatic.      Right Ear: Hearing, tympanic membrane, ear canal and external ear normal. No decreased hearing noted. No drainage or swelling.      Left Ear: Hearing, tympanic membrane, ear canal and external ear normal. No decreased hearing noted. No drainage or swelling.      Nose: Nose normal. No rhinorrhea.      Mouth/Throat:      Mouth: Oropharynx is clear and moist.      Pharynx: No oropharyngeal exudate, posterior oropharyngeal edema or posterior oropharyngeal erythema.   Eyes:      General: Lids are normal.         Right eye: No discharge.          Left eye: No discharge.      Extraocular Movements: EOM normal.      Conjunctiva/sclera: Conjunctivae normal.      Right eye: Right conjunctiva is not injected. No exudate.     Left eye: Left conjunctiva is not injected. No exudate.     Pupils: Pupils are equal, round, and reactive to light.   Neck:      Thyroid: No thyroid mass or thyromegaly.      Vascular: Normal carotid pulses. No carotid bruit, hepatojugular reflux or JVD.      Trachea: Trachea normal.   Cardiovascular:      Rate and Rhythm: Normal rate and regular rhythm.      Heart sounds: Normal heart sounds.   Pulmonary:      Effort: Pulmonary effort is normal. No respiratory distress.   Abdominal:      General: Bowel sounds are normal.      Palpations: Abdomen is soft.      Tenderness: There is no abdominal tenderness. Negative signs include Landin's sign.   Musculoskeletal:      Cervical back: Full passive range of motion without pain. No edema, erythema or rigidity.   Lymphadenopathy:      Cervical: No cervical adenopathy.   Skin:     General: Skin is warm and dry.   Neurological:      Mental Status: He is alert.   Psychiatric:         Mood and Affect: Mood and affect normal.         Speech: Speech normal.         Behavior: Behavior normal.         Assessment and Plan:   Nguyễn was seen today for cough.    Diagnoses and all orders for this visit:    Acute bronchitis, unspecified organism    Adjustment reaction with mixed emotional features    Other orders  -     albuterol (PROVENTIL/VENTOLIN HFA) 90 mcg/actuation inhaler; Inhale 2 puffs into the lungs every 6 (six) hours as needed for Wheezing or Shortness of Breath.  -     promethazine-dextromethorphan (PROMETHAZINE-DM) 6.25-15 mg/5 mL Syrp; Take 5 mLs by mouth nightly as needed (cough).  -     azithromycin (Z-DENIA) 250 MG tablet; Take 2 tablets by mouth on day 1; Take 1 tablet by mouth on days 2-5  -     methylPREDNISolone (MEDROL DOSEPACK) 4 mg tablet; use as directed    Hydrate, rest, OTC  Mucinex Expectorant as directed, Nasal saline as needed.  OTC Zyrtec as directed.      No follow-ups on file.    THIS NOTE WILL BE SHARED WITH THE PATIENT.

## 2022-02-18 VITALS
OXYGEN SATURATION: 98 % | WEIGHT: 248.69 LBS | HEART RATE: 94 BPM | SYSTOLIC BLOOD PRESSURE: 132 MMHG | TEMPERATURE: 98 F | BODY MASS INDEX: 33.68 KG/M2 | DIASTOLIC BLOOD PRESSURE: 84 MMHG | HEIGHT: 72 IN

## 2022-02-18 PROBLEM — R21 RASH: Status: RESOLVED | Noted: 2017-02-20 | Resolved: 2022-02-18

## 2022-02-18 PROBLEM — F43.29 ADJUSTMENT REACTION WITH MIXED EMOTIONAL FEATURES: Status: ACTIVE | Noted: 2022-02-18

## 2022-03-31 ENCOUNTER — OFFICE VISIT (OUTPATIENT)
Dept: PRIMARY CARE CLINIC | Facility: CLINIC | Age: 37
End: 2022-03-31
Payer: COMMERCIAL

## 2022-03-31 VITALS
HEART RATE: 106 BPM | DIASTOLIC BLOOD PRESSURE: 86 MMHG | WEIGHT: 253.31 LBS | OXYGEN SATURATION: 96 % | SYSTOLIC BLOOD PRESSURE: 124 MMHG | HEIGHT: 72 IN | TEMPERATURE: 98 F | BODY MASS INDEX: 34.31 KG/M2

## 2022-03-31 DIAGNOSIS — F43.29 ADJUSTMENT REACTION WITH MIXED EMOTIONAL FEATURES: ICD-10-CM

## 2022-03-31 DIAGNOSIS — Z12.83 SKIN CANCER SCREENING: ICD-10-CM

## 2022-03-31 DIAGNOSIS — J01.90 ACUTE BACTERIAL SINUSITIS: ICD-10-CM

## 2022-03-31 DIAGNOSIS — Z00.00 WELL ADULT EXAM: ICD-10-CM

## 2022-03-31 DIAGNOSIS — Z86.16 HISTORY OF COVID-19: ICD-10-CM

## 2022-03-31 DIAGNOSIS — B96.89 ACUTE BACTERIAL SINUSITIS: ICD-10-CM

## 2022-03-31 DIAGNOSIS — Z90.49 HISTORY OF CHOLECYSTECTOMY: ICD-10-CM

## 2022-03-31 DIAGNOSIS — E66.9 OBESITY (BMI 30-39.9): ICD-10-CM

## 2022-03-31 DIAGNOSIS — Z00.00 ROUTINE MEDICAL EXAM: Primary | ICD-10-CM

## 2022-03-31 DIAGNOSIS — Z83.3 FAMILY HISTORY OF DIABETES MELLITUS (DM): ICD-10-CM

## 2022-03-31 DIAGNOSIS — K21.00 GASTROESOPHAGEAL REFLUX DISEASE WITH ESOPHAGITIS, UNSPECIFIED WHETHER HEMORRHAGE: ICD-10-CM

## 2022-03-31 PROCEDURE — 3074F SYST BP LT 130 MM HG: CPT | Mod: CPTII,S$GLB,, | Performed by: FAMILY MEDICINE

## 2022-03-31 PROCEDURE — 99395 PREV VISIT EST AGE 18-39: CPT | Mod: S$GLB,,, | Performed by: FAMILY MEDICINE

## 2022-03-31 PROCEDURE — 99395 PR PREVENTIVE VISIT,EST,18-39: ICD-10-PCS | Mod: S$GLB,,, | Performed by: FAMILY MEDICINE

## 2022-03-31 PROCEDURE — 1159F PR MEDICATION LIST DOCUMENTED IN MEDICAL RECORD: ICD-10-PCS | Mod: CPTII,S$GLB,, | Performed by: FAMILY MEDICINE

## 2022-03-31 PROCEDURE — 3008F BODY MASS INDEX DOCD: CPT | Mod: CPTII,S$GLB,, | Performed by: FAMILY MEDICINE

## 2022-03-31 PROCEDURE — 3079F DIAST BP 80-89 MM HG: CPT | Mod: CPTII,S$GLB,, | Performed by: FAMILY MEDICINE

## 2022-03-31 PROCEDURE — 3008F PR BODY MASS INDEX (BMI) DOCUMENTED: ICD-10-PCS | Mod: CPTII,S$GLB,, | Performed by: FAMILY MEDICINE

## 2022-03-31 PROCEDURE — 1160F RVW MEDS BY RX/DR IN RCRD: CPT | Mod: CPTII,S$GLB,, | Performed by: FAMILY MEDICINE

## 2022-03-31 PROCEDURE — 99999 PR PBB SHADOW E&M-EST. PATIENT-LVL V: CPT | Mod: PBBFAC,,, | Performed by: FAMILY MEDICINE

## 2022-03-31 PROCEDURE — 1160F PR REVIEW ALL MEDS BY PRESCRIBER/CLIN PHARMACIST DOCUMENTED: ICD-10-PCS | Mod: CPTII,S$GLB,, | Performed by: FAMILY MEDICINE

## 2022-03-31 PROCEDURE — 3074F PR MOST RECENT SYSTOLIC BLOOD PRESSURE < 130 MM HG: ICD-10-PCS | Mod: CPTII,S$GLB,, | Performed by: FAMILY MEDICINE

## 2022-03-31 PROCEDURE — 3079F PR MOST RECENT DIASTOLIC BLOOD PRESSURE 80-89 MM HG: ICD-10-PCS | Mod: CPTII,S$GLB,, | Performed by: FAMILY MEDICINE

## 2022-03-31 PROCEDURE — 1159F MED LIST DOCD IN RCRD: CPT | Mod: CPTII,S$GLB,, | Performed by: FAMILY MEDICINE

## 2022-03-31 PROCEDURE — 99999 PR PBB SHADOW E&M-EST. PATIENT-LVL V: ICD-10-PCS | Mod: PBBFAC,,, | Performed by: FAMILY MEDICINE

## 2022-03-31 RX ORDER — PROMETHAZINE HYDROCHLORIDE AND DEXTROMETHORPHAN HYDROBROMIDE 6.25; 15 MG/5ML; MG/5ML
5 SYRUP ORAL NIGHTLY PRN
Qty: 118 ML | Refills: 0 | Status: SHIPPED | OUTPATIENT
Start: 2022-03-31 | End: 2022-07-11

## 2022-03-31 RX ORDER — AMOXICILLIN AND CLAVULANATE POTASSIUM 875; 125 MG/1; MG/1
1 TABLET, FILM COATED ORAL EVERY 12 HOURS
Qty: 20 TABLET | Refills: 0 | Status: SHIPPED | OUTPATIENT
Start: 2022-03-31 | End: 2022-07-11

## 2022-03-31 RX ORDER — METHYLPREDNISOLONE 4 MG/1
TABLET ORAL
Qty: 1 EACH | Refills: 0 | Status: SHIPPED | OUTPATIENT
Start: 2022-03-31 | End: 2022-04-21

## 2022-03-31 RX ORDER — OMEPRAZOLE 20 MG/1
20 CAPSULE, DELAYED RELEASE ORAL DAILY
Qty: 90 CAPSULE | Refills: 1 | Status: SHIPPED | OUTPATIENT
Start: 2022-03-31 | End: 2022-06-28

## 2022-03-31 NOTE — PROGRESS NOTES
Subjective:   Patient ID: Nguyễn Gale Jr. is a 36 y.o. male.    Chief Complaint: Annual Exam      HPI    36-year-old male here with his wife for annual exam    Health maintenance reviewed with patient in detail including screening labs and vaccines    Having some sinus congestion for greater than 2 weeks with thick nasal discharge.  Some cough    Doing well on fluoxetine for adjustment reaction    Has chronic reflux.  Has never seen GI for possible EGD.    Saw GI in the past for problems related to cholecystectomy and residual stone etcetera      Patient queried and denies any further complaints.    ALLERGIES AND MEDICATIONS: updated and reviewed.  Review of patient's allergies indicates:   Allergen Reactions    Plantain (jaime)      Butterfly vine       Current Outpatient Medications:     albuterol (PROVENTIL/VENTOLIN HFA) 90 mcg/actuation inhaler, Inhale 2 puffs into the lungs every 6 (six) hours as needed for Wheezing or Shortness of Breath., Disp: 18 g, Rfl: 1    FLUoxetine 10 MG capsule, TAKE 1 CAPSULE BY MOUTH DAILY, Disp: 90 capsule, Rfl: 3    amoxicillin-clavulanate 875-125mg (AUGMENTIN) 875-125 mg per tablet, Take 1 tablet by mouth every 12 (twelve) hours., Disp: 20 tablet, Rfl: 0    methylPREDNISolone (MEDROL DOSEPACK) 4 mg tablet, use as directed, Disp: 1 each, Rfl: 0    omeprazole (PRILOSEC) 20 MG capsule, Take 1 capsule (20 mg total) by mouth once daily., Disp: 90 capsule, Rfl: 1    pneumococcal 23-prashanth ps (PNEUMOVAX-23) 25 mcg/0.5 mL vaccine, Inject 0.5 mLs into the muscle once. for 1 dose, Disp: 0.5 mL, Rfl: 0    promethazine-dextromethorphan (PROMETHAZINE-DM) 6.25-15 mg/5 mL Syrp, Take 5 mLs by mouth nightly as needed (cough; do not take and drive). Do not take and drive. Do not take while working., Disp: 118 mL, Rfl: 0    Review of Systems   Constitutional: Negative for activity change, appetite change, chills, diaphoresis, fatigue, fever and unexpected weight change.   HENT: Positive  for congestion, sinus pressure and sinus pain. Negative for ear discharge, ear pain, facial swelling, hearing loss, nosebleeds, postnasal drip, rhinorrhea, sneezing, sore throat, tinnitus, trouble swallowing and voice change.    Eyes: Negative for photophobia, pain, discharge, redness, itching and visual disturbance.   Respiratory: Negative for cough, chest tightness, shortness of breath and wheezing.    Cardiovascular: Negative for chest pain, palpitations and leg swelling.   Gastrointestinal: Negative for abdominal distention, abdominal pain, anal bleeding, blood in stool, constipation, diarrhea, nausea, rectal pain and vomiting.   Endocrine: Negative for cold intolerance, heat intolerance, polydipsia, polyphagia and polyuria.   Genitourinary: Negative for difficulty urinating, dysuria and flank pain.   Musculoskeletal: Negative for arthralgias, back pain, joint swelling, myalgias and neck pain.   Skin: Negative for rash.   Neurological: Negative for dizziness, tremors, seizures, syncope, speech difficulty, weakness, light-headedness, numbness and headaches.   Psychiatric/Behavioral: Negative for behavioral problems, confusion, decreased concentration, dysphoric mood, sleep disturbance and suicidal ideas. The patient is not nervous/anxious and is not hyperactive.        Lab Results   Component Value Date    WBC 6.59 07/24/2019    HGB 16.2 07/24/2019    HCT 48.4 07/24/2019    MCV 97 07/24/2019     07/24/2019         CMP  Sodium   Date Value Ref Range Status   07/24/2019 140 136 - 145 mmol/L Final     Potassium   Date Value Ref Range Status   07/24/2019 4.4 3.5 - 5.1 mmol/L Final     Chloride   Date Value Ref Range Status   07/24/2019 104 95 - 110 mmol/L Final     CO2   Date Value Ref Range Status   07/24/2019 27 23 - 29 mmol/L Final     Glucose   Date Value Ref Range Status   07/24/2019 89 70 - 110 mg/dL Final     BUN   Date Value Ref Range Status   07/24/2019 9 6 - 20 mg/dL Final     Creatinine   Date Value  Ref Range Status   07/24/2019 0.9 0.5 - 1.4 mg/dL Final     Calcium   Date Value Ref Range Status   07/24/2019 9.7 8.7 - 10.5 mg/dL Final     Total Protein   Date Value Ref Range Status   07/24/2019 7.2 6.0 - 8.4 g/dL Final     Albumin   Date Value Ref Range Status   07/24/2019 4.0 3.5 - 5.2 g/dL Final     Total Bilirubin   Date Value Ref Range Status   07/24/2019 0.4 0.1 - 1.0 mg/dL Final     Comment:     For infants and newborns, interpretation of results should be based  on gestational age, weight and in agreement with clinical  observations.  Premature Infant recommended reference ranges:  Up to 24 hours.............<8.0 mg/dL  Up to 48 hours............<12.0 mg/dL  3-5 days..................<15.0 mg/dL  6-29 days.................<15.0 mg/dL       Alkaline Phosphatase   Date Value Ref Range Status   07/24/2019 83 55 - 135 U/L Final     AST   Date Value Ref Range Status   07/24/2019 23 10 - 40 U/L Final     ALT   Date Value Ref Range Status   07/24/2019 25 10 - 44 U/L Final     Anion Gap   Date Value Ref Range Status   07/24/2019 9 8 - 16 mmol/L Final     eGFR if    Date Value Ref Range Status   07/24/2019 >60.0 >60 mL/min/1.73 m^2 Final     eGFR if non    Date Value Ref Range Status   07/24/2019 >60.0 >60 mL/min/1.73 m^2 Final     Comment:     Calculation used to obtain the estimated glomerular filtration  rate (eGFR) is the CKD-EPI equation.           Lab Results   Component Value Date    HGBA1C 5.5 02/07/2017        Objective:     Vitals:    03/31/22 1552 03/31/22 1604   BP: (!) 138/90 124/86   Pulse: 106    Temp: 98.1 °F (36.7 °C)    TempSrc: Oral    SpO2: 96%    Weight: 114.9 kg (253 lb 4.9 oz)    Height: 6' (1.829 m)    PainSc: 0-No pain      Body mass index is 34.35 kg/m².    Physical Exam  Vitals and nursing note reviewed.   Constitutional:       General: He is not in acute distress.     Appearance: He is well-developed. He is not diaphoretic.   HENT:      Head:  Normocephalic and atraumatic.      Right Ear: Hearing, tympanic membrane, ear canal and external ear normal. No tenderness.      Left Ear: Hearing, tympanic membrane, ear canal and external ear normal. No tenderness.      Nose: Nose normal.   Eyes:      General: Lids are normal. No scleral icterus.        Right eye: No discharge.         Left eye: No discharge.      Extraocular Movements:      Right eye: Normal extraocular motion.      Left eye: Normal extraocular motion.      Conjunctiva/sclera: Conjunctivae normal.      Right eye: Right conjunctiva is not injected.      Left eye: Left conjunctiva is not injected.      Pupils: Pupils are equal, round, and reactive to light.   Neck:      Thyroid: No thyromegaly.      Vascular: No carotid bruit or JVD.      Trachea: No tracheal deviation.   Cardiovascular:      Rate and Rhythm: Normal rate and regular rhythm.      Pulses: Normal pulses.      Heart sounds: Normal heart sounds. No murmur heard.    No friction rub.   Pulmonary:      Effort: Pulmonary effort is normal. No accessory muscle usage or respiratory distress.      Breath sounds: Normal breath sounds. No wheezing, rhonchi or rales.   Abdominal:      General: Bowel sounds are normal. There is no distension or abdominal bruit.      Palpations: Abdomen is soft. There is no mass or pulsatile mass.      Tenderness: There is no abdominal tenderness. There is no guarding or rebound. Negative signs include Landin's sign and McBurney's sign.   Musculoskeletal:      Cervical back: Normal range of motion and neck supple. No edema.   Lymphadenopathy:      Head:      Right side of head: No submandibular, preauricular or posterior auricular adenopathy.      Left side of head: No submandibular, preauricular or posterior auricular adenopathy.      Cervical: No cervical adenopathy.   Skin:     General: Skin is warm and dry.      Findings: No ecchymosis, erythema or rash. Rash is not urticarial.      Nails: There is no clubbing.    Neurological:      Mental Status: He is alert and oriented to person, place, and time.      GCS: GCS eye subscore is 4. GCS verbal subscore is 5. GCS motor subscore is 6.   Psychiatric:         Mood and Affect: Mood is not anxious or depressed. Affect is not angry or inappropriate.         Speech: Speech normal.         Behavior: Behavior normal. Behavior is cooperative.         Thought Content: Thought content normal.         Assessment and Plan:   Nguyễn was seen today for annual exam.    Diagnoses and all orders for this visit:    Routine medical exam  -     CBC Without Differential; Future  -     Comprehensive Metabolic Panel; Future  -     Lipid Panel; Future  -     TSH; Future  -     Hemoglobin A1C; Future    Adjustment reaction with mixed emotional features    Gastroesophageal reflux disease with esophagitis, unspecified whether hemorrhage  -     Ambulatory referral/consult to Gastroenterology; Future    Family history of diabetes mellitus (DM)    History of COVID-19    Obesity (BMI 30-39.9)    Well adult exam    History of cholecystectomy    Skin cancer screening  -     Ambulatory referral/consult to Dermatology; Future    Acute bacterial sinusitis    Other orders  -     omeprazole (PRILOSEC) 20 MG capsule; Take 1 capsule (20 mg total) by mouth once daily.  -     amoxicillin-clavulanate 875-125mg (AUGMENTIN) 875-125 mg per tablet; Take 1 tablet by mouth every 12 (twelve) hours.  -     promethazine-dextromethorphan (PROMETHAZINE-DM) 6.25-15 mg/5 mL Syrp; Take 5 mLs by mouth nightly as needed (cough; do not take and drive). Do not take and drive. Do not take while working.  -     methylPREDNISolone (MEDROL DOSEPACK) 4 mg tablet; use as directed        No follow-ups on file.    THIS NOTE WILL BE SHARED WITH THE PATIENT.

## 2022-04-29 ENCOUNTER — PATIENT MESSAGE (OUTPATIENT)
Dept: PRIMARY CARE CLINIC | Facility: CLINIC | Age: 37
End: 2022-04-29

## 2022-05-12 ENCOUNTER — PATIENT OUTREACH (OUTPATIENT)
Dept: ADMINISTRATIVE | Facility: OTHER | Age: 37
End: 2022-05-12
Payer: COMMERCIAL

## 2022-07-11 ENCOUNTER — OFFICE VISIT (OUTPATIENT)
Dept: GASTROENTEROLOGY | Facility: CLINIC | Age: 37
End: 2022-07-11
Payer: COMMERCIAL

## 2022-07-11 VITALS — WEIGHT: 262.81 LBS | HEIGHT: 72 IN | BODY MASS INDEX: 35.6 KG/M2

## 2022-07-11 DIAGNOSIS — K21.00 GASTROESOPHAGEAL REFLUX DISEASE WITH ESOPHAGITIS, UNSPECIFIED WHETHER HEMORRHAGE: ICD-10-CM

## 2022-07-11 PROCEDURE — 99999 PR PBB SHADOW E&M-EST. PATIENT-LVL III: CPT | Mod: PBBFAC,,, | Performed by: INTERNAL MEDICINE

## 2022-07-11 PROCEDURE — 1159F PR MEDICATION LIST DOCUMENTED IN MEDICAL RECORD: ICD-10-PCS | Mod: CPTII,S$GLB,, | Performed by: INTERNAL MEDICINE

## 2022-07-11 PROCEDURE — 3008F PR BODY MASS INDEX (BMI) DOCUMENTED: ICD-10-PCS | Mod: CPTII,S$GLB,, | Performed by: INTERNAL MEDICINE

## 2022-07-11 PROCEDURE — 3044F PR MOST RECENT HEMOGLOBIN A1C LEVEL <7.0%: ICD-10-PCS | Mod: CPTII,S$GLB,, | Performed by: INTERNAL MEDICINE

## 2022-07-11 PROCEDURE — 99999 PR PBB SHADOW E&M-EST. PATIENT-LVL III: ICD-10-PCS | Mod: PBBFAC,,, | Performed by: INTERNAL MEDICINE

## 2022-07-11 PROCEDURE — 3008F BODY MASS INDEX DOCD: CPT | Mod: CPTII,S$GLB,, | Performed by: INTERNAL MEDICINE

## 2022-07-11 PROCEDURE — 3044F HG A1C LEVEL LT 7.0%: CPT | Mod: CPTII,S$GLB,, | Performed by: INTERNAL MEDICINE

## 2022-07-11 PROCEDURE — 99203 PR OFFICE/OUTPT VISIT, NEW, LEVL III, 30-44 MIN: ICD-10-PCS | Mod: S$GLB,,, | Performed by: INTERNAL MEDICINE

## 2022-07-11 PROCEDURE — 1159F MED LIST DOCD IN RCRD: CPT | Mod: CPTII,S$GLB,, | Performed by: INTERNAL MEDICINE

## 2022-07-11 PROCEDURE — 99203 OFFICE O/P NEW LOW 30 MIN: CPT | Mod: S$GLB,,, | Performed by: INTERNAL MEDICINE

## 2022-07-11 NOTE — PROGRESS NOTES
GASTROENTEROLOGY CLINIC NOTE    Reason for visit: The encounter diagnosis was Gastroesophageal reflux disease with esophagitis, unspecified whether hemorrhage.  Referring provider/PCP: Federico Munoz MD    HPI:  Nguyễn Gale Jr. is a 36 y.o. male here today for reflux, new patient.    Prior symptoms of acid indigestion, burning, sometimes pain with eating, associated with nausea and vomiting for years.  He had his gallbladder out but that did not really changes symptoms.  Now he is on Prilosec 20 1st thing in the morning and a probiotic gummy.  He has noted substantial relief with these interventions.  He currently is in his normal state and without any symptoms.  He is very happy with the progress that these medications have given him.  There is no radiating symptoms.  There is no further vomiting.  There is no dysphagia.  There is no abdominal pain.  He has fairly regular bowel habits.  He does smoke. He does eat late and night.  Tries to avoid red sauces etc but isnt reliable with his diet.    Prior Endoscopy:  EGD: years ago and was told it was ok  Colon: no    (Portions of this note were dictated using voice recognition software and may contain dictation related errors in spelling/grammar/syntax not found on text review)    Review of Systems   Constitutional: Negative for fever and malaise/fatigue.   Respiratory: Negative for cough and shortness of breath.    Cardiovascular: Negative for chest pain and palpitations.   Gastrointestinal: Negative for abdominal pain, blood in stool, nausea and vomiting.   Neurological: Negative for dizziness and headaches.       Past Medical History: has a past medical history of Anxiety, GERD (gastroesophageal reflux disease), and Tobacco use.    Past Surgical History: has a past surgical history that includes Cholecystectomy.    Family History:family history includes Cirrhosis in his maternal grandfather; Colon cancer in his paternal grandfather; Colon polyps in his  paternal grandfather; Diabetes in his father and paternal grandfather; Glaucoma in his maternal grandmother; Heart disease in his father and paternal grandfather; Hyperlipidemia in his father and paternal grandfather; Hypertension in his father and paternal grandfather; Liver disease in his maternal grandfather; No Known Problems in his mother; Peripheral vascular disease in his father.    Allergies:   Review of patient's allergies indicates:   Allergen Reactions    Plantain (jaime)      Butterfly vine       Social History: reports that he has been smoking cigarettes. He has a 5.00 pack-year smoking history. He has never used smokeless tobacco. He reports current alcohol use. He reports current drug use. Frequency: 7.00 times per week. Drug: Marijuana.    Home medications:   Current Outpatient Medications on File Prior to Visit   Medication Sig Dispense Refill    FLUoxetine 10 MG capsule TAKE 1 CAPSULE BY MOUTH DAILY 90 capsule 3    Lactobac no.41/Bifidobact no.7 (PROBIOTIC-10 ORAL) Take by mouth.      multivitamin capsule Take 1 capsule by mouth once daily.      omeprazole (PRILOSEC) 20 MG capsule TAKE 1 CAPSULE (20 MG TOTAL) BY MOUTH ONCE DAILY. 90 capsule 3    [DISCONTINUED] albuterol (PROVENTIL/VENTOLIN HFA) 90 mcg/actuation inhaler Inhale 2 puffs into the lungs every 6 (six) hours as needed for Wheezing or Shortness of Breath. 18 g 1    [DISCONTINUED] amoxicillin-clavulanate 875-125mg (AUGMENTIN) 875-125 mg per tablet Take 1 tablet by mouth every 12 (twelve) hours. 20 tablet 0    [DISCONTINUED] promethazine-dextromethorphan (PROMETHAZINE-DM) 6.25-15 mg/5 mL Syrp Take 5 mLs by mouth nightly as needed (cough; do not take and drive). Do not take and drive. Do not take while working. 118 mL 0     No current facility-administered medications on file prior to visit.       Vital signs:  Ht 6' (1.829 m)   Wt 119.2 kg (262 lb 12.6 oz)   BMI 35.64 kg/m²     Physical Exam  Vitals reviewed.   Constitutional:        Appearance: He is not toxic-appearing.   HENT:      Head: Normocephalic and atraumatic.   Eyes:      General: No scleral icterus.     Conjunctiva/sclera: Conjunctivae normal.   Neurological:      Mental Status: He is alert and oriented to person, place, and time.      Gait: Gait normal.   Psychiatric:         Mood and Affect: Mood normal.         Behavior: Behavior normal.         I have reviewed prior labs, imaging, notes from last month    Assessment:  1. Gastroesophageal reflux disease with esophagitis, unspecified whether hemorrhage      He knows that wt loss, stopping smoking, and not eating late will significantly help his symptoms.  He will begin working on these things  Otherwise, symptoms completely resolved with prilosec 20 and probiotic gummy regimen.    Plan:       Continue prilosec 20 and probiotic gummy    Counseled on PPI usage, safety etc.    RTC prn  He knows  To contact us for any symptom changes and then we would plan EGD      Andrew Renee MD  [facility] [place]

## 2022-07-15 ENCOUNTER — PATIENT MESSAGE (OUTPATIENT)
Dept: INTERNAL MEDICINE | Facility: CLINIC | Age: 37
End: 2022-07-15
Payer: COMMERCIAL

## 2022-08-01 ENCOUNTER — OFFICE VISIT (OUTPATIENT)
Dept: DERMATOLOGY | Facility: CLINIC | Age: 37
End: 2022-08-01
Payer: COMMERCIAL

## 2022-08-01 DIAGNOSIS — D48.5 NEOPLASM OF UNCERTAIN BEHAVIOR OF SKIN: Primary | ICD-10-CM

## 2022-08-01 DIAGNOSIS — B36.0 TINEA VERSICOLOR: ICD-10-CM

## 2022-08-01 DIAGNOSIS — D22.5 MELANOCYTIC NEVI OF TRUNK: ICD-10-CM

## 2022-08-01 PROCEDURE — 88305 TISSUE EXAM BY PATHOLOGIST: CPT | Mod: 26,,, | Performed by: PATHOLOGY

## 2022-08-01 PROCEDURE — 99213 OFFICE O/P EST LOW 20 MIN: CPT | Mod: 25,S$GLB,, | Performed by: DERMATOLOGY

## 2022-08-01 PROCEDURE — 11102 TANGNTL BX SKIN SINGLE LES: CPT | Mod: S$GLB,,, | Performed by: DERMATOLOGY

## 2022-08-01 PROCEDURE — 1160F PR REVIEW ALL MEDS BY PRESCRIBER/CLIN PHARMACIST DOCUMENTED: ICD-10-PCS | Mod: CPTII,S$GLB,, | Performed by: DERMATOLOGY

## 2022-08-01 PROCEDURE — 1159F PR MEDICATION LIST DOCUMENTED IN MEDICAL RECORD: ICD-10-PCS | Mod: CPTII,S$GLB,, | Performed by: DERMATOLOGY

## 2022-08-01 PROCEDURE — 88342 IMHCHEM/IMCYTCHM 1ST ANTB: CPT | Performed by: PATHOLOGY

## 2022-08-01 PROCEDURE — 88342 CHG IMMUNOCYTOCHEMISTRY: ICD-10-PCS | Mod: 26,,, | Performed by: PATHOLOGY

## 2022-08-01 PROCEDURE — 88305 TISSUE EXAM BY PATHOLOGIST: ICD-10-PCS | Mod: 26,,, | Performed by: PATHOLOGY

## 2022-08-01 PROCEDURE — 99213 PR OFFICE/OUTPT VISIT, EST, LEVL III, 20-29 MIN: ICD-10-PCS | Mod: 25,S$GLB,, | Performed by: DERMATOLOGY

## 2022-08-01 PROCEDURE — 1159F MED LIST DOCD IN RCRD: CPT | Mod: CPTII,S$GLB,, | Performed by: DERMATOLOGY

## 2022-08-01 PROCEDURE — 3044F HG A1C LEVEL LT 7.0%: CPT | Mod: CPTII,S$GLB,, | Performed by: DERMATOLOGY

## 2022-08-01 PROCEDURE — 88341 IMHCHEM/IMCYTCHM EA ADD ANTB: CPT | Mod: 26,,, | Performed by: PATHOLOGY

## 2022-08-01 PROCEDURE — 1160F RVW MEDS BY RX/DR IN RCRD: CPT | Mod: CPTII,S$GLB,, | Performed by: DERMATOLOGY

## 2022-08-01 PROCEDURE — 11102 PR TANGENTIAL BIOPSY, SKIN, SINGLE LESION: ICD-10-PCS | Mod: S$GLB,,, | Performed by: DERMATOLOGY

## 2022-08-01 PROCEDURE — 88342 IMHCHEM/IMCYTCHM 1ST ANTB: CPT | Mod: 26,,, | Performed by: PATHOLOGY

## 2022-08-01 PROCEDURE — 88341 IMHCHEM/IMCYTCHM EA ADD ANTB: CPT | Mod: 59 | Performed by: PATHOLOGY

## 2022-08-01 PROCEDURE — 88305 TISSUE EXAM BY PATHOLOGIST: CPT | Performed by: PATHOLOGY

## 2022-08-01 PROCEDURE — 3044F PR MOST RECENT HEMOGLOBIN A1C LEVEL <7.0%: ICD-10-PCS | Mod: CPTII,S$GLB,, | Performed by: DERMATOLOGY

## 2022-08-01 PROCEDURE — 88341 PR IHC OR ICC EACH ADD'L SINGLE ANTIBODY  STAINPR: ICD-10-PCS | Mod: 26,,, | Performed by: PATHOLOGY

## 2022-08-01 RX ORDER — KETOCONAZOLE 20 MG/G
CREAM TOPICAL
Qty: 60 G | Refills: 1 | Status: SHIPPED | OUTPATIENT
Start: 2022-08-01 | End: 2022-10-25

## 2022-08-01 NOTE — PATIENT INSTRUCTIONS
Biopsy Wound Care Instructions    Leave the bandage on for 24 hours without getting it wet.   Clean the area once a day with a gentle soap and water, then pat dry and apply Vaseline and a bandaid.  The site should be kept moist with Vaseline at all times to improve healing. Reapply a thick coating as needed. Do not let the site air out or form a scab, as this will delay healing and worsen scarring.  If any bleeding or oozing occurs once you return home, apply firm pressure to the area for 30 minutes straight without peeking. If bleeding continues, call the office immediately.  Please message us via [facility], call us at [phone], or return to the office at any sign of increasing redness, swelling, tenderness, pain, heat, yellow drainage/discharge, or continued bleeding.      Receiving Your Pathology Results    Your pathology results will be released to you on [facility] at the same time that Dr. Urias receives them.   Dr. Urias will then message you with her interpretation of the results and/or with the plan going forward.  If you do not use [facility] or if your pathology results require more of an explanation, you will receive your results via a phone call.  If 2 weeks go by and you have not received your results, please message us via [facility] or call us at [phone] to inform us.

## 2022-08-01 NOTE — PROGRESS NOTES
Patient Information  Name: Nguyễn Gale Jr.  : 1985  MRN: [id number]     Referring Physician:  Alexander   Primary Care Physician:  Federico Munoz MD   Date of Visit: 2022      Subjective:     History of Present lllness:    Nguyễn Gale Jr. is a 36 y.o. male who presents with a chief complaint of mole.    Location: back  Duration: unsure  Signs/Symptoms: raised growth  Relieving factors/Prior treatments: none    Location: L chest  Duration: <2 weeks  Signs/Symptoms: rash with itching at times  Relieving factors/Prior treatments: unsure of name     Clinical documentation obtained by nursing staff reviewed.    Review of Systems   Constitutional: Negative for fever, chills, fatigue and malaise.   HENT: Negative for sore throat, rhinorrhea, mouth sores, lip swelling and tongue swelling.    Eyes: Negative for itching, eye watering and eye irritation.   Respiratory: Negative for cough.    Gastrointestinal: Negative for abdominal pain and diarrhea.   Genitourinary: Negative for genital sores and genital itching.   Musculoskeletal: Negative for arthralgias.   Skin: Positive for itching and rash.   Hematologic/Lymphatic: Negative for adenopathy.   Allergic/Immunologic: Positive for environmental allergies.       Objective:   Physical Exam   Constitutional: He appears well-developed and well-nourished. No distress.   Neurological: He is alert and oriented to person, place, and time. He is not disoriented.   Psychiatric: He has a normal mood and affect.   Skin:   Areas Examined (abnormalities noted in diagram):   Chest / Axilla Inspection Performed  Back Inspection Performed            Diagram Legend     Erythematous scaling macule/papule c/w actinic keratosis       Vascular papule c/w angioma      Pigmented verrucoid papule/plaque c/w seborrheic keratosis      Yellow umbilicated papule c/w sebaceous hyperplasia      Irregularly shaped tan macule c/w lentigo     1-2 mm smooth white papules consistent with  Milia      Movable subcutaneous cyst with punctum c/w epidermal inclusion cyst      Subcutaneous movable cyst c/w pilar cyst      Firm pink to brown papule c/w dermatofibroma      Pedunculated fleshy papule(s) c/w skin tag(s)      Evenly pigmented macule c/w junctional nevus     Mildly variegated pigmented, slightly irregular-bordered macule c/w mildly atypical nevus      Flesh colored to evenly pigmented papule c/w intradermal nevus       Pink pearly papule/plaque c/w basal cell carcinoma      Erythematous hyperkeratotic cursted plaque c/w SCC      Surgical scar with no sign of skin cancer recurrence      Open and closed comedones      Inflammatory papules and pustules      Verrucoid papule consistent consistent with wart     Erythematous eczematous patches and plaques     Dystrophic onycholytic nail with subungual debris c/w onychomycosis     Umbilicated papule    Erythematous-base heme-crusted tan verrucoid plaque consistent with inflamed seborrheic keratosis     Erythematous Silvery Scaling Plaque c/w Psoriasis     See annotation          [] Data reviewed  [] Prior external notes reviewed  [] Independent review of test  [] Management discussed with another provider  [] Independent historian    Assessment / Plan:      Pathology Orders:     Normal Orders This Visit    Specimen to Pathology, Dermatology     Comments:    Number of Specimens:->1  ------------------------->-------------------------  Spec 1 Procedure:->Biopsy  Spec 1 Clinical Impression:->r/o dysplastic nevus  Spec 1 Source:->midline mid back    Questions:    Procedure Type: Dermatology and skin neoplasms    Number of Specimens: 1    ------------------------: -------------------------    Spec 1 Procedure: Biopsy    Spec 1 Clinical Impression: r/o dysplastic nevus    Spec 1 Source: midline mid back    Release to patient:         Neoplasm of uncertain behavior of skin  -     Specimen to Pathology, Dermatology    Shave biopsy procedure note:  Risk,  benefits, and alternatives of biopsy are discussed with the patient, including risk of infection, scar, recurrence, and need for additional treatment of site. The patient agrees to the procedure by verbal consent. The area is marked and prepped with alcohol.  Approximately 1 mL of lidocaine 1% with epinephrine is used for local anesthesia. A sharp blade is used to remove the lesion. The specimen is sent for pathology. Hemostasis is obtained with aluminum chloride and/or monopolar hyfrecation if needed. The area is then dressed and bandaged. The patient tolerated the procedure well without adverse event. Written instructions on wound care were given and were reviewed with the patient, who is to call for any signs of bleeding or infection. The patient will be notified of the pathology results.    Tinea versicolor  - acute, uncomplicated problem  Skin scraping was performed on lesion(s) on the chest for a Chlorazol E + KOH prep. Fungal elements were visualized under the microscope.  -     ketoconazole (NIZORAL) 2 % cream; Apply to affected areas of chest BID x 1 mo.  Dispense: 60 g; Refill: 1    Melanocytic nevi of trunk  Benign-appearing nevus on exam today. Counseled patient to periodically examine moles and return to clinic if any changes in size, shape, or color are noted or if it becomes symptomatic (bleeding, itching, pain, etc).    Follow up if symptoms worsen or fail to improve or dependent on pathology results.      Lesa Urias MD, FAAD  [facility]

## 2022-08-10 ENCOUNTER — PATIENT MESSAGE (OUTPATIENT)
Dept: DERMATOLOGY | Facility: CLINIC | Age: 37
End: 2022-08-10
Payer: COMMERCIAL

## 2022-08-10 LAB
FINAL PATHOLOGIC DIAGNOSIS: NORMAL
GROSS: NORMAL
Lab: NORMAL
MICROSCOPIC EXAM: NORMAL

## 2022-08-11 ENCOUNTER — PATIENT MESSAGE (OUTPATIENT)
Dept: DERMATOLOGY | Facility: CLINIC | Age: 37
End: 2022-08-11
Payer: COMMERCIAL

## 2022-08-11 ENCOUNTER — TELEPHONE (OUTPATIENT)
Dept: DERMATOLOGY | Facility: CLINIC | Age: 37
End: 2022-08-11
Payer: COMMERCIAL

## 2022-08-11 NOTE — TELEPHONE ENCOUNTER
Discussed biopsy and scheduled procedure ----- Message from Shwetha Steele sent at 8/11/2022  3:27 PM CDT -----  Regarding: procedure  Contact: pt  Pt calling to schedule procedure for mole removal , please call       Confirmed patient's contact info below:  Contact Name: Nguyễn Gale  Phone Number: [phone]

## 2022-08-11 NOTE — PROGRESS NOTES
Please let patient know that the path results showed a severely atypical mole which requires a surgical excision to treat it. Please schedule procedure for definitive treatment, and review pre- and post-op instructions with him.  KK    Final Pathologic Diagnosis   1. Skin, midline mid back, shave biopsy:   - Melanocytic nevus, compound type with architectural disorder, lentiginous   features, and severe cytologic atypia (Lj's nevus).   - Margins are negative in the planes of section.

## 2022-09-01 ENCOUNTER — PROCEDURE VISIT (OUTPATIENT)
Dept: DERMATOLOGY | Facility: CLINIC | Age: 37
End: 2022-09-01
Payer: COMMERCIAL

## 2022-09-01 DIAGNOSIS — D48.5 NEOPLASM OF UNCERTAIN BEHAVIOR OF SKIN: Primary | ICD-10-CM

## 2022-09-01 PROCEDURE — 88305 TISSUE EXAM BY PATHOLOGIST: CPT | Mod: 26,,, | Performed by: DERMATOLOGY

## 2022-09-01 PROCEDURE — 99499 UNLISTED E&M SERVICE: CPT | Mod: S$GLB,,, | Performed by: DERMATOLOGY

## 2022-09-01 PROCEDURE — 88305 TISSUE EXAM BY PATHOLOGIST: ICD-10-PCS | Mod: 26,,, | Performed by: DERMATOLOGY

## 2022-09-01 PROCEDURE — 11402 EXC TR-EXT B9+MARG 1.1-2 CM: CPT | Mod: 59,S$GLB,, | Performed by: DERMATOLOGY

## 2022-09-01 PROCEDURE — 88305 TISSUE EXAM BY PATHOLOGIST: CPT | Performed by: DERMATOLOGY

## 2022-09-01 PROCEDURE — 13101 PR RECMPL WND TRUNK 2.6-7.5 CM: ICD-10-PCS | Mod: S$GLB,,, | Performed by: DERMATOLOGY

## 2022-09-01 PROCEDURE — 13101 CMPLX RPR TRUNK 2.6-7.5 CM: CPT | Mod: S$GLB,,, | Performed by: DERMATOLOGY

## 2022-09-01 PROCEDURE — 11402 PR EXC SKIN BENIG 1.1-2 CM TRUNK,ARM,LEG: ICD-10-PCS | Mod: 59,S$GLB,, | Performed by: DERMATOLOGY

## 2022-09-01 PROCEDURE — 99499 NO LOS: ICD-10-PCS | Mod: S$GLB,,, | Performed by: DERMATOLOGY

## 2022-09-01 NOTE — PROGRESS NOTES
PROCEDURE NOTE: EXCISION WITH COMPLEX REPAIR    DIAGNOSIS: Neoplasm of uncertain behavior of skin - severely atypical nevus    LOCATION: midline mid back    ASSISTANT: Wanda Adams LPN    ANESTHESIA:  1% Lidocaine with Epinephrine and Sodium bicarbonate, 6 mL    PREPARATION: The diagnosis, procedure, alternatives, benefits and risks, including but not limited to: infection, bleeding/bruising, scar or cosmetic defect, local sensation disturbances, wound dehiscence (separation of wound edges after sutures removed) and/or recurrence of present condition were explained to the patient. The patient elected to proceed with the procedure by signing an informed consent.  Patient's identity was verified, and the site was verified.    PROCEDURE: Lesional tissue was carefully marked with at least 4 mm margins of clinically normal skin in all directions. The area to be anesthetized was cleaned with Hibiclens solution, injected with local anesthesia, and again prepped with Hibiclens and draped in sterile fashion to produce a suitable field for surgery. A fusiform excision was performed with a #10 blade carried down completely through the dermis into the subcutaneous tissue, and dissection was carried out in that plane. The specimen of tissue was removed to be sent for histologic confirmation. Bleeding points were stopped with monopolar hyfrecation or tied with absorbable suture as needed throughout the procedure to maintain hemostasis.     CLOSURE: The wound was extensively undermined to a distance greater than the maximum width of the defect (>1.0 cm) along at least one edge of the defect to mobilize tissue and to reduce tension on the sutured wound edges.  2-0 PDS II absorbable suture was used in a layered fashion closure with fascial plication to reduce tension on the wound, as well as with buried vertical mattress sutures through dermis and subcutaneous tissue to close the wound and to vasu the cutaneous wound edge.  This allows for preservation of structure and function of surrounding anatomy. The epidermis and dermis were sutured with 3-0 Ethilon in an interrupted fashion.     SIZE OF DEFECT: 1.7 x 1.0 cm    LENGTH OF REPAIR: 3.3 cm     The area was then cleaned, and a sterile pressure dressing applied. The procedure was tolerated well without adverse event and negligible blood loss. The patient was discharged in stable condition.     Post op instructions: the patient was verbally (and in writing) educated on care of the wound and told to call or return for bleeding, tenderness, redness, etc. Patient should use acetaminophen 1000 mg every 8 hours for pain relief if needed.     Suture removal in 14 day(s).

## 2022-09-08 LAB
FINAL PATHOLOGIC DIAGNOSIS: NORMAL
GROSS: NORMAL
Lab: NORMAL
MICROSCOPIC EXAM: NORMAL

## 2022-09-08 NOTE — PROGRESS NOTES
Final Pathologic Diagnosis Skin, midline midback, excision:   -SCAR (POST-SURGICAL)   -NO RESIDUAL ATYPICAL NEVUS  IDENTIFIED

## 2022-09-15 ENCOUNTER — CLINICAL SUPPORT (OUTPATIENT)
Dept: DERMATOLOGY | Facility: CLINIC | Age: 37
End: 2022-09-15
Payer: COMMERCIAL

## 2022-09-15 DIAGNOSIS — Z48.02 VISIT FOR SUTURE REMOVAL: Primary | ICD-10-CM

## 2022-09-15 PROCEDURE — 99024 PR POST-OP FOLLOW-UP VISIT: ICD-10-PCS | Mod: S$GLB,,, | Performed by: DERMATOLOGY

## 2022-09-15 PROCEDURE — 99024 POSTOP FOLLOW-UP VISIT: CPT | Mod: S$GLB,,, | Performed by: DERMATOLOGY

## 2022-09-15 NOTE — PROGRESS NOTES
Patient Information  Name: Nguyễn Gale Jr.  : 1985  MRN: [id number]     Suture Removal note:  CC: 36 y.o. male patient is here for suture removal.     HPI: Patient is s/p excision of severely atypical mole from the midline mid back on 22.  Patient reports no problems.    WOUND PE:  Sutures intact.  Wound healing well.  Good approximation of skin edges.  No signs or symptoms of infection.    IMPRESSION:  Sections show skin with a post-surgical scar. No elements of the original lesion are identified.     PLAN:  Sutures removed today.  Continue wound care.

## 2022-10-25 ENCOUNTER — OFFICE VISIT (OUTPATIENT)
Dept: PRIMARY CARE CLINIC | Facility: CLINIC | Age: 37
End: 2022-10-25
Payer: COMMERCIAL

## 2022-10-25 DIAGNOSIS — E78.2 MIXED HYPERLIPIDEMIA: ICD-10-CM

## 2022-10-25 DIAGNOSIS — Z86.16 HISTORY OF COVID-19: ICD-10-CM

## 2022-10-25 DIAGNOSIS — K52.9 ACUTE GASTROENTERITIS: Primary | ICD-10-CM

## 2022-10-25 DIAGNOSIS — E66.01 SEVERE OBESITY (BMI 35.0-39.9) WITH COMORBIDITY: ICD-10-CM

## 2022-10-25 PROCEDURE — 3075F PR MOST RECENT SYSTOLIC BLOOD PRESS GE 130-139MM HG: ICD-10-PCS | Mod: CPTII,S$GLB,, | Performed by: FAMILY MEDICINE

## 2022-10-25 PROCEDURE — 99999 PR PBB SHADOW E&M-EST. PATIENT-LVL III: ICD-10-PCS | Mod: PBBFAC,,, | Performed by: FAMILY MEDICINE

## 2022-10-25 PROCEDURE — 99213 OFFICE O/P EST LOW 20 MIN: CPT | Mod: S$GLB,,, | Performed by: FAMILY MEDICINE

## 2022-10-25 PROCEDURE — 99999 PR PBB SHADOW E&M-EST. PATIENT-LVL III: CPT | Mod: PBBFAC,,, | Performed by: FAMILY MEDICINE

## 2022-10-25 PROCEDURE — 3079F DIAST BP 80-89 MM HG: CPT | Mod: CPTII,S$GLB,, | Performed by: FAMILY MEDICINE

## 2022-10-25 PROCEDURE — 1160F RVW MEDS BY RX/DR IN RCRD: CPT | Mod: CPTII,S$GLB,, | Performed by: FAMILY MEDICINE

## 2022-10-25 PROCEDURE — 1159F PR MEDICATION LIST DOCUMENTED IN MEDICAL RECORD: ICD-10-PCS | Mod: CPTII,S$GLB,, | Performed by: FAMILY MEDICINE

## 2022-10-25 PROCEDURE — 3079F PR MOST RECENT DIASTOLIC BLOOD PRESSURE 80-89 MM HG: ICD-10-PCS | Mod: CPTII,S$GLB,, | Performed by: FAMILY MEDICINE

## 2022-10-25 PROCEDURE — 1159F MED LIST DOCD IN RCRD: CPT | Mod: CPTII,S$GLB,, | Performed by: FAMILY MEDICINE

## 2022-10-25 PROCEDURE — 99213 PR OFFICE/OUTPT VISIT, EST, LEVL III, 20-29 MIN: ICD-10-PCS | Mod: S$GLB,,, | Performed by: FAMILY MEDICINE

## 2022-10-25 PROCEDURE — 3044F PR MOST RECENT HEMOGLOBIN A1C LEVEL <7.0%: ICD-10-PCS | Mod: CPTII,S$GLB,, | Performed by: FAMILY MEDICINE

## 2022-10-25 PROCEDURE — 1160F PR REVIEW ALL MEDS BY PRESCRIBER/CLIN PHARMACIST DOCUMENTED: ICD-10-PCS | Mod: CPTII,S$GLB,, | Performed by: FAMILY MEDICINE

## 2022-10-25 PROCEDURE — 3044F HG A1C LEVEL LT 7.0%: CPT | Mod: CPTII,S$GLB,, | Performed by: FAMILY MEDICINE

## 2022-10-25 PROCEDURE — 3075F SYST BP GE 130 - 139MM HG: CPT | Mod: CPTII,S$GLB,, | Performed by: FAMILY MEDICINE

## 2022-10-25 NOTE — PROGRESS NOTES
/82 (BP Location: Right arm, Patient Position: Sitting, BP Method: Large (Manual))   Pulse 101   Temp 98 °F (36.7 °C) (Oral)   Ht 6' (1.829 m)   Wt 121.5 kg (267 lb 13.7 oz)   SpO2 95%   BMI 36.33 kg/m²     Chief Complaint: NVD        Nguyễn Gale Jr. is a 36 y.o. male here for    Diarrhea   This is a new problem. The current episode started in the past 7 days. The problem occurs more than 10 times per day. The problem has been rapidly improving. The stool consistency is described as Watery. The patient states that diarrhea awakens him from sleep. Associated symptoms include abdominal pain, chills and sweats. Pertinent negatives include no arthralgias, coughing, fever, headaches, myalgias, URI, vomiting or weight loss. There are no known risk factors. He has tried nothing for the symptoms. The treatment provided moderate relief. There is no history of bowel resection, inflammatory bowel disease, irritable bowel syndrome, malabsorption, a recent abdominal surgery or short gut syndrome.     Patient queried and denies any further complaints    SURGICAL AND MEDICAL HISTORY: updated and reviewed.  ALLERGIES updated and reviewed.  Review of patient's allergies indicates:   Allergen Reactions    Plantain (jaime)      Butterfly vine     CURRENT OUTPATIENT MEDICATIONS updated and reviewed    Current Outpatient Medications:     FLUoxetine 10 MG capsule, TAKE 1 CAPSULE BY MOUTH DAILY, Disp: 90 capsule, Rfl: 3    multivitamin capsule, Take 1 capsule by mouth once daily., Disp: , Rfl:     omeprazole (PRILOSEC) 20 MG capsule, TAKE 1 CAPSULE (20 MG TOTAL) BY MOUTH ONCE DAILY., Disp: 90 capsule, Rfl: 3    Review of Systems   Constitutional:  Positive for chills. Negative for activity change, appetite change, diaphoresis, fatigue, fever, unexpected weight change and weight loss.   HENT:  Negative for congestion, ear discharge, ear pain, facial swelling, hearing loss, nosebleeds, postnasal drip, rhinorrhea, sinus  pressure, sneezing, sore throat, tinnitus, trouble swallowing and voice change.    Eyes:  Negative for photophobia, pain, discharge, redness, itching and visual disturbance.   Respiratory:  Negative for cough, chest tightness, shortness of breath and wheezing.    Cardiovascular:  Negative for chest pain, palpitations and leg swelling.   Gastrointestinal:  Positive for abdominal pain and diarrhea. Negative for abdominal distention, anal bleeding, blood in stool, constipation, nausea, rectal pain and vomiting.   Endocrine: Negative for cold intolerance, heat intolerance, polydipsia, polyphagia and polyuria.   Genitourinary:  Negative for difficulty urinating, dysuria and flank pain.   Musculoskeletal:  Negative for arthralgias, back pain, joint swelling, myalgias and neck pain.   Skin:  Negative for rash.   Neurological:  Negative for dizziness, tremors, seizures, syncope, speech difficulty, weakness, light-headedness, numbness and headaches.   Psychiatric/Behavioral:  Negative for behavioral problems, confusion, decreased concentration, dysphoric mood, sleep disturbance and suicidal ideas. The patient is not nervous/anxious and is not hyperactive.      /82 (BP Location: Right arm, Patient Position: Sitting, BP Method: Large (Manual))   Pulse 101   Temp 98 °F (36.7 °C) (Oral)   Ht 6' (1.829 m)   Wt 121.5 kg (267 lb 13.7 oz)   SpO2 95%   BMI 36.33 kg/m²   Physical Exam  Vitals and nursing note reviewed.   Constitutional:       General: He is not in acute distress.     Appearance: Normal appearance. He is well-developed. He is not ill-appearing or toxic-appearing.   HENT:      Head: Normocephalic and atraumatic.      Right Ear: Tympanic membrane, ear canal and external ear normal.      Left Ear: Tympanic membrane, ear canal and external ear normal.      Nose: Nose normal.      Mouth/Throat:      Lips: Pink.      Mouth: Mucous membranes are moist.      Pharynx: No oropharyngeal exudate or posterior  oropharyngeal erythema.   Eyes:      General: No scleral icterus.        Right eye: No discharge.         Left eye: No discharge.      Extraocular Movements: Extraocular movements intact.      Conjunctiva/sclera: Conjunctivae normal.   Cardiovascular:      Rate and Rhythm: Normal rate and regular rhythm.      Pulses: Normal pulses.      Heart sounds: Normal heart sounds. No murmur heard.  Pulmonary:      Effort: Pulmonary effort is normal. No respiratory distress.      Breath sounds: Normal breath sounds. No wheezing or rales.   Abdominal:      General: Bowel sounds are increased. There is no distension.      Palpations: Abdomen is soft. There is no mass.      Tenderness: There is no abdominal tenderness. There is no right CVA tenderness, left CVA tenderness, guarding or rebound.      Hernia: No hernia is present.   Musculoskeletal:      Cervical back: Normal range of motion and neck supple. No rigidity or tenderness.   Lymphadenopathy:      Cervical: No cervical adenopathy.   Skin:     General: Skin is warm and dry.   Neurological:      General: No focal deficit present.      Mental Status: He is alert. Mental status is at baseline.   Psychiatric:         Mood and Affect: Mood normal.         Behavior: Behavior normal. Behavior is cooperative.     Nguyễn was seen today for influenza.    Diagnoses and all orders for this visit:    Acute gastroenteritis    Severe obesity (BMI 35.0-39.9) with comorbidity    History of COVID-19    Mixed hyperlipidemia      Gastroenteritis--Hydrate with clear liquids. (Dilute Powerade, Gatorade, water, flat Sprite). No carbonation (increases nausea). When hungry, slowly advance diet to full liquids and then to cold, bland diet. Zofran Rx as directed. If emesis starts and can't be resolved with Zofran, go to ER.   If diarrhea starts, I recommend good, soft toilet paper or baby wipes. Consider using Desitin or Jassi's Butt Paste. Wash hands thoroughly. I don't especially recommend  Imodium or Pepto-Bismol; let the diarrhea take its course. You will not become dehydrated as long as we stop the vomiting.       Reviewed old and all outside pertinent medical records available.     All lab results personally reviewed and interpreted by me.      Federico Munoz MD    ========

## 2022-10-28 VITALS
BODY MASS INDEX: 36.28 KG/M2 | HEIGHT: 72 IN | OXYGEN SATURATION: 95 % | WEIGHT: 267.88 LBS | SYSTOLIC BLOOD PRESSURE: 130 MMHG | TEMPERATURE: 98 F | DIASTOLIC BLOOD PRESSURE: 82 MMHG | HEART RATE: 101 BPM

## 2022-10-28 PROBLEM — E66.812 CLASS 2 OBESITY DUE TO EXCESS CALORIES WITHOUT SERIOUS COMORBIDITY WITH BODY MASS INDEX (BMI) OF 36.0 TO 36.9 IN ADULT: Status: ACTIVE | Noted: 2022-10-28

## 2022-10-28 PROBLEM — E78.2 MIXED HYPERLIPIDEMIA: Status: ACTIVE | Noted: 2022-10-28

## 2022-10-28 PROBLEM — E66.09 CLASS 2 OBESITY DUE TO EXCESS CALORIES WITHOUT SERIOUS COMORBIDITY WITH BODY MASS INDEX (BMI) OF 36.0 TO 36.9 IN ADULT: Status: ACTIVE | Noted: 2022-10-28

## 2023-02-06 ENCOUNTER — HOSPITAL ENCOUNTER (OUTPATIENT)
Dept: RADIOLOGY | Facility: HOSPITAL | Age: 38
Discharge: HOME | End: 2023-02-06
Attending: FAMILY MEDICINE
Payer: COMMERCIAL

## 2023-02-06 ENCOUNTER — TELEPHONE (OUTPATIENT)
Dept: PRIMARY CARE CLINIC | Facility: CLINIC | Age: 38
End: 2023-02-06

## 2023-02-06 ENCOUNTER — OFFICE VISIT (OUTPATIENT)
Dept: PRIMARY CARE CLINIC | Facility: CLINIC | Age: 38
End: 2023-02-06
Payer: COMMERCIAL

## 2023-02-06 VITALS
WEIGHT: 270.31 LBS | DIASTOLIC BLOOD PRESSURE: 92 MMHG | TEMPERATURE: 98 F | OXYGEN SATURATION: 96 % | HEART RATE: 93 BPM | HEIGHT: 72 IN | SYSTOLIC BLOOD PRESSURE: 142 MMHG | BODY MASS INDEX: 36.61 KG/M2

## 2023-02-06 DIAGNOSIS — E78.2 MIXED HYPERLIPIDEMIA: ICD-10-CM

## 2023-02-06 DIAGNOSIS — M79.674 PAIN OF TOE OF RIGHT FOOT: Primary | ICD-10-CM

## 2023-02-06 DIAGNOSIS — R19.5 LOOSE BOWEL MOVEMENTS: ICD-10-CM

## 2023-02-06 DIAGNOSIS — E66.01 SEVERE OBESITY (BMI 35.0-39.9) WITH COMORBIDITY: ICD-10-CM

## 2023-02-06 DIAGNOSIS — M79.674 PAIN OF TOE OF RIGHT FOOT: ICD-10-CM

## 2023-02-06 DIAGNOSIS — R03.0 TRANSIENT ELEVATED BLOOD PRESSURE: ICD-10-CM

## 2023-02-06 PROCEDURE — 1160F RVW MEDS BY RX/DR IN RCRD: CPT | Mod: CPTII,S$GLB,, | Performed by: FAMILY MEDICINE

## 2023-02-06 PROCEDURE — 3008F PR BODY MASS INDEX (BMI) DOCUMENTED: ICD-10-PCS | Mod: CPTII,S$GLB,, | Performed by: FAMILY MEDICINE

## 2023-02-06 PROCEDURE — 3080F DIAST BP >= 90 MM HG: CPT | Mod: CPTII,S$GLB,, | Performed by: FAMILY MEDICINE

## 2023-02-06 PROCEDURE — 3080F PR MOST RECENT DIASTOLIC BLOOD PRESSURE >= 90 MM HG: ICD-10-PCS | Mod: CPTII,S$GLB,, | Performed by: FAMILY MEDICINE

## 2023-02-06 PROCEDURE — 1160F PR REVIEW ALL MEDS BY PRESCRIBER/CLIN PHARMACIST DOCUMENTED: ICD-10-PCS | Mod: CPTII,S$GLB,, | Performed by: FAMILY MEDICINE

## 2023-02-06 PROCEDURE — 3077F PR MOST RECENT SYSTOLIC BLOOD PRESSURE >= 140 MM HG: ICD-10-PCS | Mod: CPTII,S$GLB,, | Performed by: FAMILY MEDICINE

## 2023-02-06 PROCEDURE — 1159F MED LIST DOCD IN RCRD: CPT | Mod: CPTII,S$GLB,, | Performed by: FAMILY MEDICINE

## 2023-02-06 PROCEDURE — 99214 PR OFFICE/OUTPT VISIT, EST, LEVL IV, 30-39 MIN: ICD-10-PCS | Mod: S$GLB,,, | Performed by: FAMILY MEDICINE

## 2023-02-06 PROCEDURE — 73630 X-RAY EXAM OF FOOT: CPT | Mod: TC,PN,RT

## 2023-02-06 PROCEDURE — 73630 XR FOOT COMPLETE 3 VIEW RIGHT: ICD-10-PCS | Mod: 26,RT,, | Performed by: RADIOLOGY

## 2023-02-06 PROCEDURE — 1159F PR MEDICATION LIST DOCUMENTED IN MEDICAL RECORD: ICD-10-PCS | Mod: CPTII,S$GLB,, | Performed by: FAMILY MEDICINE

## 2023-02-06 PROCEDURE — 99999 PR PBB SHADOW E&M-EST. PATIENT-LVL IV: CPT | Mod: PBBFAC,,, | Performed by: FAMILY MEDICINE

## 2023-02-06 PROCEDURE — 99999 PR PBB SHADOW E&M-EST. PATIENT-LVL IV: ICD-10-PCS | Mod: PBBFAC,,, | Performed by: FAMILY MEDICINE

## 2023-02-06 PROCEDURE — 3077F SYST BP >= 140 MM HG: CPT | Mod: CPTII,S$GLB,, | Performed by: FAMILY MEDICINE

## 2023-02-06 PROCEDURE — 73630 X-RAY EXAM OF FOOT: CPT | Mod: 26,RT,, | Performed by: RADIOLOGY

## 2023-02-06 PROCEDURE — 3008F BODY MASS INDEX DOCD: CPT | Mod: CPTII,S$GLB,, | Performed by: FAMILY MEDICINE

## 2023-02-06 PROCEDURE — 99214 OFFICE O/P EST MOD 30 MIN: CPT | Mod: S$GLB,,, | Performed by: FAMILY MEDICINE

## 2023-02-06 RX ORDER — METHOCARBAMOL 500 MG/1
500 TABLET, FILM COATED ORAL 4 TIMES DAILY
Qty: 40 TABLET | Refills: 0 | Status: SHIPPED | OUTPATIENT
Start: 2023-02-06 | End: 2023-02-16

## 2023-02-06 RX ORDER — OMEPRAZOLE 40 MG/1
40 CAPSULE, DELAYED RELEASE ORAL DAILY
Qty: 90 CAPSULE | Refills: 3 | Status: SHIPPED | OUTPATIENT
Start: 2023-02-06 | End: 2024-01-24 | Stop reason: SDUPTHER

## 2023-02-06 RX ORDER — NAPROXEN 500 MG/1
500 TABLET ORAL 2 TIMES DAILY
Qty: 60 TABLET | Refills: 0 | Status: SHIPPED | OUTPATIENT
Start: 2023-02-06 | End: 2023-06-07

## 2023-02-06 NOTE — TELEPHONE ENCOUNTER
----- Message from Federico Munoz MD sent at 2/6/2023 12:08 PM CST -----  PT COMING IN THIS AFTERNOON for poss broken bone. He may keep the appt BUT I HIGHLY REC he go to  where it can be xrayed immediately after his eval by them.   MESSAGE AND CALL PLZ

## 2023-02-06 NOTE — TELEPHONE ENCOUNTER
Patient informed per recommendations below.  Patient states he will keep the appt bc he has other issues to be evaluated.  URI and stomach pain.

## 2023-02-06 NOTE — PROGRESS NOTES
BP (!) 142/92 (BP Location: Left arm, Patient Position: Sitting, BP Method: Large (Manual))   Pulse 93   Temp 98 °F (36.7 °C)   Ht 6' (1.829 m)   Wt 122.6 kg (270 lb 4.5 oz)   SpO2 96%   BMI 36.66 kg/m²       ===========    Chief Complaint: Foot Injury, Foreign Body in Nose, and Flank Pain        Nguyễn Gale Jr. is a 37 y.o. male here for    HPI      Pain of toe.  5th digit of right foot accidentally kicked chair last night. Stated toe was deformed at the time.    Also with right flank discomfort.  Not related to movement.  Not really right upper quadrant pain.  Ho cholecystectomy  About 7 years ago    On ozempic from outside  physician  Having upset stomach with it.    No real pain but some discomfort and sometimes with some loose stools.    Patient queried and denies any further complaints    SURGICAL AND MEDICAL HISTORY: updated and reviewed.  ALLERGIES updated and reviewed.  Review of patient's allergies indicates:   Allergen Reactions    Plantain (jaime)      Butterfly vine     CURRENT OUTPATIENT MEDICATIONS updated and reviewed    Current Outpatient Medications:     multivitamin capsule, Take 1 capsule by mouth once daily., Disp: , Rfl:     semaglutide (OZEMPIC) 0.25 mg or 0.5 mg(2 mg/1.5 mL) pen injector, Inject into the skin every 7 days., Disp: , Rfl:     methocarbamoL (ROBAXIN) 500 MG Tab, Take 1 tablet (500 mg total) by mouth 4 (four) times daily. for 10 days, Disp: 40 tablet, Rfl: 0    naproxen (NAPROSYN) 500 MG tablet, Take 1 tablet (500 mg total) by mouth 2 (two) times daily. As needed for pain, Disp: 60 tablet, Rfl: 0    omeprazole (PRILOSEC) 40 MG capsule, Take 1 capsule (40 mg total) by mouth once daily., Disp: 90 capsule, Rfl: 3    Review of Systems   Constitutional:  Negative for activity change, appetite change, chills, diaphoresis, fatigue, fever and unexpected weight change.   HENT:  Negative for congestion, ear discharge, ear pain, facial swelling, hearing loss,  nosebleeds, postnasal drip, rhinorrhea, sinus pressure, sneezing, sore throat, tinnitus, trouble swallowing and voice change.    Eyes:  Negative for photophobia, pain, discharge, redness, itching and visual disturbance.   Respiratory:  Negative for cough, chest tightness, shortness of breath and wheezing.    Cardiovascular:  Negative for chest pain, palpitations and leg swelling.   Gastrointestinal:  Negative for abdominal distention, abdominal pain, anal bleeding, blood in stool, constipation, diarrhea, nausea, rectal pain and vomiting.   Endocrine: Negative for cold intolerance, heat intolerance, polydipsia, polyphagia and polyuria.   Genitourinary:  Negative for difficulty urinating, dysuria, flank pain, hematuria and urgency.   Musculoskeletal:  Negative for arthralgias, back pain, joint swelling, myalgias and neck pain.   Skin:  Negative for rash.   Neurological:  Negative for dizziness, tremors, seizures, syncope, speech difficulty, weakness, light-headedness, numbness and headaches.   Psychiatric/Behavioral:  Negative for behavioral problems, confusion, decreased concentration, dysphoric mood, sleep disturbance and suicidal ideas. The patient is not nervous/anxious and is not hyperactive.      BP (!) 142/92 (BP Location: Left arm, Patient Position: Sitting, BP Method: Large (Manual))   Pulse 93   Temp 98 °F (36.7 °C)   Ht 6' (1.829 m)   Wt 122.6 kg (270 lb 4.5 oz)   SpO2 96%   BMI 36.66 kg/m²   Physical Exam  Vitals and nursing note reviewed.   Constitutional:       General: He is not in acute distress.     Appearance: Normal appearance. He is well-developed. He is not ill-appearing or toxic-appearing.   HENT:      Head: Normocephalic and atraumatic.      Right Ear: Tympanic membrane, ear canal and external ear normal.      Left Ear: Tympanic membrane, ear canal and external ear normal.      Nose: Nose normal.      Mouth/Throat:      Lips: Pink.      Mouth: Mucous membranes are moist.      Pharynx: No  oropharyngeal exudate or posterior oropharyngeal erythema.   Eyes:      General: No scleral icterus.        Right eye: No discharge.         Left eye: No discharge.      Extraocular Movements: Extraocular movements intact.      Conjunctiva/sclera: Conjunctivae normal.   Cardiovascular:      Rate and Rhythm: Normal rate and regular rhythm.      Pulses: Normal pulses.      Heart sounds: Normal heart sounds. No murmur heard.  Pulmonary:      Effort: Pulmonary effort is normal. No respiratory distress.      Breath sounds: Normal breath sounds. No wheezing or rales.   Abdominal:      General: Bowel sounds are normal. There is no distension.      Palpations: Abdomen is soft. There is no mass.      Tenderness: There is no abdominal tenderness. There is no right CVA tenderness, left CVA tenderness, guarding or rebound.      Hernia: No hernia is present.   Musculoskeletal:      Cervical back: Normal range of motion and neck supple. No rigidity or tenderness.   Lymphadenopathy:      Cervical: No cervical adenopathy.   Skin:     General: Skin is warm and dry.   Neurological:      General: No focal deficit present.      Mental Status: He is alert. Mental status is at baseline.   Psychiatric:         Mood and Affect: Mood normal.         Behavior: Behavior normal. Behavior is cooperative.       Nguyễn was seen today for foot injury, foreign body in nose and flank pain.    Diagnoses and all orders for this visit:    Pain of toe of right foot  -     X-Ray Foot Complete Right; Future    Mixed hyperlipidemia    Severe obesity (BMI 35.0-39.9) with comorbidity    Transient elevated blood pressure    Loose bowel movements    Other orders  -     omeprazole (PRILOSEC) 40 MG capsule; Take 1 capsule (40 mg total) by mouth once daily.  -     methocarbamoL (ROBAXIN) 500 MG Tab; Take 1 tablet (500 mg total) by mouth 4 (four) times daily. for 10 days  -     naproxen (NAPROSYN) 500 MG tablet; Take 1 tablet (500 mg total) by mouth 2 (two) times  daily. As needed for pain         elevated blood pressure today maybe secondary to pain.  He was some home readings in about 2 weeks.  Follow-up with prescribing physician regarding Ozempic.    Continue to restrict calories and exercise for weight loss     Check x-ray of foot.  Anti-inflammatories.  Buddy tape.  Reviewed old pertinent medical records available.     All lab results personally reviewed and interpreted by me including last year, if available.        Federico Munoz MD    Est  Time spent in the evaluation and management of this patient exceeded 30min and greater than 50% of this time was in face-to-face time with the patient.    Total time including the following:  -preparing to see the patient (e.g., obtaining and/or reviewing old records such as old primary care notes, specialists notes, hospital notes, review of laboratory tests, radiographic and/or cardiology studies)  -orders which can include medications, laboratory studies, radiographic studies, procedures, referrals etcetera   --communicating with the patient and/or family member/caregiver regarding a detailed explanation of the treatment/care plan  -arranging possible referrals and follow-up plan  -documentation of the visit in the electronic health record

## 2023-02-14 PROBLEM — R03.0 TRANSIENT ELEVATED BLOOD PRESSURE: Status: ACTIVE | Noted: 2023-02-14

## 2023-02-27 ENCOUNTER — PATIENT OUTREACH (OUTPATIENT)
Dept: ADMINISTRATIVE | Facility: HOSPITAL | Age: 38
End: 2023-02-27
Payer: COMMERCIAL

## 2023-02-27 NOTE — PROGRESS NOTES
Patient due for the following    TETANUS VACCINE     COVID-19 Vaccine (4 - Booster for Pfizer series)    Influenza Vaccine (1)      Immunizations: reviewed and updated  Care Everywhere: triggered  Care Teams: up to date  Outreach:  none needed

## 2023-03-04 ENCOUNTER — PATIENT MESSAGE (OUTPATIENT)
Dept: PRIMARY CARE CLINIC | Facility: CLINIC | Age: 38
End: 2023-03-04
Payer: COMMERCIAL

## 2023-06-07 ENCOUNTER — OFFICE VISIT (OUTPATIENT)
Dept: PRIMARY CARE CLINIC | Facility: CLINIC | Age: 38
End: 2023-06-07
Payer: COMMERCIAL

## 2023-06-07 VITALS
WEIGHT: 246.94 LBS | BODY MASS INDEX: 33.45 KG/M2 | OXYGEN SATURATION: 97 % | SYSTOLIC BLOOD PRESSURE: 124 MMHG | TEMPERATURE: 98 F | HEIGHT: 72 IN | HEART RATE: 95 BPM | DIASTOLIC BLOOD PRESSURE: 88 MMHG

## 2023-06-07 DIAGNOSIS — F33.1 MODERATE EPISODE OF RECURRENT MAJOR DEPRESSIVE DISORDER: ICD-10-CM

## 2023-06-07 DIAGNOSIS — F17.200 SMOKER: ICD-10-CM

## 2023-06-07 DIAGNOSIS — K21.00 GASTROESOPHAGEAL REFLUX DISEASE WITH ESOPHAGITIS, UNSPECIFIED WHETHER HEMORRHAGE: ICD-10-CM

## 2023-06-07 DIAGNOSIS — R03.0 TRANSIENT ELEVATED BLOOD PRESSURE: ICD-10-CM

## 2023-06-07 DIAGNOSIS — L73.9 FOLLICULITIS OF NOSE: Primary | ICD-10-CM

## 2023-06-07 PROBLEM — F43.29 ADJUSTMENT REACTION WITH MIXED EMOTIONAL FEATURES: Status: RESOLVED | Noted: 2022-02-18 | Resolved: 2023-06-07

## 2023-06-07 PROCEDURE — 99214 OFFICE O/P EST MOD 30 MIN: CPT | Mod: S$GLB,,, | Performed by: FAMILY MEDICINE

## 2023-06-07 PROCEDURE — 3008F BODY MASS INDEX DOCD: CPT | Mod: CPTII,S$GLB,, | Performed by: FAMILY MEDICINE

## 2023-06-07 PROCEDURE — 1159F MED LIST DOCD IN RCRD: CPT | Mod: CPTII,S$GLB,, | Performed by: FAMILY MEDICINE

## 2023-06-07 PROCEDURE — 99214 PR OFFICE/OUTPT VISIT, EST, LEVL IV, 30-39 MIN: ICD-10-PCS | Mod: S$GLB,,, | Performed by: FAMILY MEDICINE

## 2023-06-07 PROCEDURE — 1160F PR REVIEW ALL MEDS BY PRESCRIBER/CLIN PHARMACIST DOCUMENTED: ICD-10-PCS | Mod: CPTII,S$GLB,, | Performed by: FAMILY MEDICINE

## 2023-06-07 PROCEDURE — 3074F SYST BP LT 130 MM HG: CPT | Mod: CPTII,S$GLB,, | Performed by: FAMILY MEDICINE

## 2023-06-07 PROCEDURE — 3079F PR MOST RECENT DIASTOLIC BLOOD PRESSURE 80-89 MM HG: ICD-10-PCS | Mod: CPTII,S$GLB,, | Performed by: FAMILY MEDICINE

## 2023-06-07 PROCEDURE — 3074F PR MOST RECENT SYSTOLIC BLOOD PRESSURE < 130 MM HG: ICD-10-PCS | Mod: CPTII,S$GLB,, | Performed by: FAMILY MEDICINE

## 2023-06-07 PROCEDURE — 99999 PR PBB SHADOW E&M-EST. PATIENT-LVL III: ICD-10-PCS | Mod: PBBFAC,,, | Performed by: FAMILY MEDICINE

## 2023-06-07 PROCEDURE — 3079F DIAST BP 80-89 MM HG: CPT | Mod: CPTII,S$GLB,, | Performed by: FAMILY MEDICINE

## 2023-06-07 PROCEDURE — 3008F PR BODY MASS INDEX (BMI) DOCUMENTED: ICD-10-PCS | Mod: CPTII,S$GLB,, | Performed by: FAMILY MEDICINE

## 2023-06-07 PROCEDURE — 1160F RVW MEDS BY RX/DR IN RCRD: CPT | Mod: CPTII,S$GLB,, | Performed by: FAMILY MEDICINE

## 2023-06-07 PROCEDURE — 1159F PR MEDICATION LIST DOCUMENTED IN MEDICAL RECORD: ICD-10-PCS | Mod: CPTII,S$GLB,, | Performed by: FAMILY MEDICINE

## 2023-06-07 PROCEDURE — 99999 PR PBB SHADOW E&M-EST. PATIENT-LVL III: CPT | Mod: PBBFAC,,, | Performed by: FAMILY MEDICINE

## 2023-06-07 RX ORDER — FLUOXETINE 10 MG/1
10 CAPSULE ORAL DAILY
Qty: 90 CAPSULE | Refills: 1 | Status: SHIPPED | OUTPATIENT
Start: 2023-06-07 | End: 2024-01-24

## 2023-06-07 RX ORDER — CEPHALEXIN 500 MG/1
500 CAPSULE ORAL EVERY 6 HOURS
Qty: 28 CAPSULE | Refills: 0 | Status: SHIPPED | OUTPATIENT
Start: 2023-06-07 | End: 2023-06-14

## 2023-06-07 RX ORDER — MUPIROCIN 20 MG/G
OINTMENT TOPICAL 3 TIMES DAILY
Qty: 1 G | Refills: 0 | Status: SHIPPED | OUTPATIENT
Start: 2023-06-07 | End: 2023-06-17

## 2023-06-07 NOTE — PROGRESS NOTES
/88   Pulse 95   Temp 98 °F (36.7 °C) (Oral)   Ht 6' (1.829 m)   Wt 112 kg (246 lb 14.6 oz)   SpO2 97%   BMI 33.49 kg/m²       ===========    Chief Complaint: No chief complaint on file.          HPI    Nguyễn Gale Jr. is a 37 y.o. male     here for    Pain in right nostril. Problem comes and goes. He felt like he had an ingrown hair and plucked it and then seemed much better but then quickly resumed.    Has put neosporin in nose and no relief.    Discusses getting back on prozac he was on 10 mg of fluoxetine and felt like that was working well at that low-dose.  He stopped this some months ago and feels like depressive symptoms are back and would like to resume the Prozac.  He had no adverse side effects before     He also has transient high blood pressure today.        Patient queried and denies any further complaints    Patient has MEDICAL HISTORY OF  Patient Active Problem List   Diagnosis    Family history of diabetes mellitus (DM)    Bilateral thoracic back pain    Chronic pain of left heel    Otitis media    History of COVID-19    History of cholecystectomy    Gastroesophageal reflux disease with esophagitis    Severe obesity (BMI 35.0-39.9) with comorbidity    Mixed hyperlipidemia    Transient elevated blood pressure    Moderate episode of recurrent major depressive disorder    Smoker       SURGICAL AND MEDICAL HISTORY: updated and reviewed.  Past Surgical History:   Procedure Laterality Date    CHOLECYSTECTOMY       ALLERGIES updated and reviewed.  Review of patient's allergies indicates:   Allergen Reactions    Plantain (jaime)      Butterfly vine       CURRENT OUTPATIENT MEDICATIONS updated and reviewed    Current Outpatient Medications:     multivitamin capsule, Take 1 capsule by mouth once daily., Disp: , Rfl:     omeprazole (PRILOSEC) 40 MG capsule, Take 1 capsule (40 mg total) by mouth once daily., Disp: 90 capsule, Rfl: 3    cephALEXin (KEFLEX) 500 MG capsule, Take 1 capsule (500  mg total) by mouth every 6 (six) hours. for 7 days, Disp: 28 capsule, Rfl: 0    FLUoxetine 10 MG capsule, Take 1 capsule (10 mg total) by mouth once daily., Disp: 90 capsule, Rfl: 1    mupirocin (BACTROBAN) 2 % ointment, Apply topically 3 (three) times daily. for 10 days, Disp: 1 g, Rfl: 0    Review of Systems   Constitutional:  Negative for activity change, appetite change, chills, diaphoresis, fatigue, fever and unexpected weight change.   HENT:  Negative for congestion, ear discharge, ear pain, facial swelling, hearing loss, nosebleeds, postnasal drip, rhinorrhea, sinus pressure, sneezing, sore throat, tinnitus, trouble swallowing and voice change.    Eyes:  Negative for photophobia, pain, discharge, redness, itching and visual disturbance.   Respiratory:  Negative for cough, chest tightness, shortness of breath and wheezing.    Cardiovascular:  Negative for chest pain, palpitations and leg swelling.   Gastrointestinal:  Negative for abdominal distention, abdominal pain, anal bleeding, blood in stool, constipation, diarrhea, nausea, rectal pain and vomiting.   Endocrine: Negative for cold intolerance, heat intolerance, polydipsia, polyphagia and polyuria.   Genitourinary:  Negative for difficulty urinating, dysuria, flank pain, hematuria and urgency.   Musculoskeletal:  Negative for arthralgias, back pain, joint swelling, myalgias and neck pain.   Skin:  Negative for rash.   Neurological:  Negative for dizziness, tremors, seizures, syncope, speech difficulty, weakness, light-headedness, numbness and headaches.   Psychiatric/Behavioral:  Negative for behavioral problems, confusion, decreased concentration, dysphoric mood, sleep disturbance and suicidal ideas. The patient is not nervous/anxious and is not hyperactive.      /88   Pulse 95   Temp 98 °F (36.7 °C) (Oral)   Ht 6' (1.829 m)   Wt 112 kg (246 lb 14.6 oz)   SpO2 97%   BMI 33.49 kg/m²   Physical Exam  Vitals and nursing note reviewed.    Constitutional:       General: He is not in acute distress.     Appearance: He is obese. He is not ill-appearing, toxic-appearing or diaphoretic.   HENT:      Nose:      Comments: Cannot really appreciate folliculitis or cellulitis in the nares  Neurological:      Mental Status: He is alert.   Psychiatric:         Mood and Affect: Mood normal.         Behavior: Behavior normal.       ASSESSMENT/PLAN  Diagnoses and all orders for this visit:    Folliculitis of nose    Moderate episode of recurrent major depressive disorder    Transient elevated blood pressure    Smoker    Gastroesophageal reflux disease with esophagitis, unspecified whether hemorrhage    Other orders  -     FLUoxetine 10 MG capsule; Take 1 capsule (10 mg total) by mouth once daily.  -     mupirocin (BACTROBAN) 2 % ointment; Apply topically 3 (three) times daily. for 10 days  -     cephALEXin (KEFLEX) 500 MG capsule; Take 1 capsule (500 mg total) by mouth every 6 (six) hours. for 7 days        All lab results over past 2 years available reviewed inc labs and any cardiology or radiology studies.  Any new prescription medications gone over in detail including reason for taking the medication, the general mechanism of action, most common possible side effects and possible costs, etcetera.    Chronic conditions updated. Other than changes or additions as above, cont current medications and maintain follow-up with specialists if indicated.     Federico Munoz MD  A dictation device was used to produce this document. Use of such devices sometimes results in grammatical errors or replacement of words that sound similarly.

## 2024-01-24 ENCOUNTER — OFFICE VISIT (OUTPATIENT)
Dept: PRIMARY CARE CLINIC | Facility: CLINIC | Age: 39
End: 2024-01-24
Payer: COMMERCIAL

## 2024-01-24 VITALS
TEMPERATURE: 98 F | BODY MASS INDEX: 26.99 KG/M2 | OXYGEN SATURATION: 99 % | HEIGHT: 73 IN | DIASTOLIC BLOOD PRESSURE: 70 MMHG | HEART RATE: 99 BPM | RESPIRATION RATE: 18 BRPM | WEIGHT: 203.69 LBS | SYSTOLIC BLOOD PRESSURE: 128 MMHG

## 2024-01-24 DIAGNOSIS — N52.9 ERECTILE DYSFUNCTION, UNSPECIFIED ERECTILE DYSFUNCTION TYPE: Chronic | ICD-10-CM

## 2024-01-24 DIAGNOSIS — Z86.59 HISTORY OF DEPRESSION: ICD-10-CM

## 2024-01-24 DIAGNOSIS — E78.2 MIXED HYPERLIPIDEMIA: ICD-10-CM

## 2024-01-24 DIAGNOSIS — K30 NON-ULCER DYSPEPSIA: Chronic | ICD-10-CM

## 2024-01-24 DIAGNOSIS — F17.210 CIGARETTE SMOKER MOTIVATED TO QUIT: Chronic | ICD-10-CM

## 2024-01-24 DIAGNOSIS — Z00.00 ROUTINE MEDICAL EXAM: Primary | ICD-10-CM

## 2024-01-24 DIAGNOSIS — Z71.85 VACCINE COUNSELING: ICD-10-CM

## 2024-01-24 PROCEDURE — 1159F MED LIST DOCD IN RCRD: CPT | Mod: CPTII,S$GLB,, | Performed by: FAMILY MEDICINE

## 2024-01-24 PROCEDURE — 3074F SYST BP LT 130 MM HG: CPT | Mod: CPTII,S$GLB,, | Performed by: FAMILY MEDICINE

## 2024-01-24 PROCEDURE — 3078F DIAST BP <80 MM HG: CPT | Mod: CPTII,S$GLB,, | Performed by: FAMILY MEDICINE

## 2024-01-24 PROCEDURE — 1160F RVW MEDS BY RX/DR IN RCRD: CPT | Mod: CPTII,S$GLB,, | Performed by: FAMILY MEDICINE

## 2024-01-24 PROCEDURE — 99999 PR PBB SHADOW E&M-EST. PATIENT-LVL IV: CPT | Mod: PBBFAC,,, | Performed by: FAMILY MEDICINE

## 2024-01-24 PROCEDURE — 99395 PREV VISIT EST AGE 18-39: CPT | Mod: S$GLB,,, | Performed by: FAMILY MEDICINE

## 2024-01-24 PROCEDURE — 3008F BODY MASS INDEX DOCD: CPT | Mod: CPTII,S$GLB,, | Performed by: FAMILY MEDICINE

## 2024-01-24 RX ORDER — OMEPRAZOLE 40 MG/1
40 CAPSULE, DELAYED RELEASE ORAL DAILY
Qty: 90 CAPSULE | Refills: 3 | Status: SHIPPED | OUTPATIENT
Start: 2024-01-24 | End: 2025-01-23

## 2024-01-24 RX ORDER — SILDENAFIL 100 MG/1
100 TABLET, FILM COATED ORAL DAILY PRN
Qty: 30 TABLET | Refills: 11 | Status: SHIPPED | OUTPATIENT
Start: 2024-01-24 | End: 2024-03-20

## 2024-01-24 RX ORDER — VARENICLINE TARTRATE 0.5 (11)-1
KIT ORAL
Qty: 1 EACH | Refills: 0 | Status: SHIPPED | OUTPATIENT
Start: 2024-01-24

## 2024-01-24 NOTE — PROGRESS NOTES
"    /70 (BP Location: Left arm, Patient Position: Sitting, BP Method: Medium (Manual))   Pulse 99   Temp 98 °F (36.7 °C)   Resp 18   Ht 6' 1" (1.854 m)   Wt 92.4 kg (203 lb 11.3 oz)   SpO2 99%   BMI 26.88 kg/m²       ===========    Chief Complaint: No chief complaint on file.          HPI    Nguyễn Gale Jr. is a 38 y.o. male     here for    Annual Wellness/Preventative Exam.  Health maintenance reviewed with patient in detail inc any recent labs and studies and needs for future screening labs.  Age-appropriate vaccines and other age-appropriate screening studies reviewed with patient in detail.  Sleep health reviewed with patient.  Skin health regarding possible skin cancer screening reviewed with patient.  General regularity of bowel movements and urinations reviewed with patient including any possibility of urine leakage.  Vision screening reviewed with patient.      Patient queried and denies any further complaints      Patient Active Problem List   Diagnosis    Family history of diabetes mellitus (DM)    Bilateral thoracic back pain    Chronic pain of left heel    Otitis media    History of COVID-19    History of cholecystectomy    Gastroesophageal reflux disease with esophagitis    Mixed hyperlipidemia    Transient elevated blood pressure    Moderate episode of recurrent major depressive disorder    Smoker    History of depression       SURGICAL AND MEDICAL HISTORY: updated and reviewed.  Past Surgical History:   Procedure Laterality Date    CHOLECYSTECTOMY       ALLERGIES updated and reviewed.  Review of patient's allergies indicates:   Allergen Reactions    Plantain (jaime)      Butterfly vine       CURRENT OUTPATIENT MEDICATIONS updated and reviewed    Current Outpatient Medications:     multivitamin capsule, Take 1 capsule by mouth once daily., Disp: , Rfl:     diphth,pertus,acell,,tetanus (BOOSTRIX TDAP) 2.5-8-5 Lf-mcg-Lf/0.5mL Syrg injection, Inject into the muscle., Disp: 0.5 mL, Rfl: " 0    flu vacc [id number] 6mos up,PF, 60 mcg (15 mcg x 4)/0.5 mL Syrg, Inject 0.5 mLs into the muscle once. for 1 dose, Disp: 0.5 mL, Rfl: 0    omeprazole (PRILOSEC) 40 MG capsule, Take 1 capsule (40 mg total) by mouth once daily., Disp: 90 capsule, Rfl: 3    sildenafiL (VIAGRA) 100 MG tablet, Take 1 tablet (100 mg total) by mouth daily as needed for Erectile Dysfunction., Disp: 30 tablet, Rfl: 11    varenicline (CHANTIX STARTING MONTH BOX) 0.5 mg (11)- 1 mg (42) tablet, Take one 0.5mg tab by mouth once daily X3 days,then increase to one 0.5mg tab twice daily X4 days,then increase to one 1mg tab twice daily, Disp: 1 each, Rfl: 0    Review of Systems   Constitutional:  Negative for activity change, appetite change, chills, diaphoresis, fatigue, fever and unexpected weight change.   HENT:  Negative for congestion, ear discharge, ear pain, facial swelling, hearing loss, nosebleeds, postnasal drip, rhinorrhea, sinus pressure, sneezing, sore throat, tinnitus, trouble swallowing and voice change.    Eyes:  Negative for photophobia, pain, discharge, redness, itching and visual disturbance.   Respiratory:  Negative for cough, chest tightness, shortness of breath and wheezing.    Cardiovascular:  Negative for chest pain, palpitations and leg swelling.   Gastrointestinal:  Negative for abdominal distention, abdominal pain, anal bleeding, blood in stool, constipation, diarrhea, nausea, rectal pain and vomiting.   Endocrine: Negative for cold intolerance, heat intolerance, polydipsia, polyphagia and polyuria.   Genitourinary:  Negative for difficulty urinating, dysuria, flank pain, hematuria and urgency.   Musculoskeletal:  Negative for arthralgias, back pain, joint swelling, myalgias and neck pain.   Skin:  Negative for rash.   Neurological:  Negative for dizziness, tremors, seizures, syncope, speech difficulty, weakness, light-headedness, numbness and headaches.   Psychiatric/Behavioral:  Negative for behavioral problems,  "confusion, decreased concentration, dysphoric mood, sleep disturbance and suicidal ideas. The patient is not nervous/anxious and is not hyperactive.        /70 (BP Location: Left arm, Patient Position: Sitting, BP Method: Medium (Manual))   Pulse 99   Temp 98 °F (36.7 °C)   Resp 18   Ht 6' 1" (1.854 m)   Wt 92.4 kg (203 lb 11.3 oz)   SpO2 99%   BMI 26.88 kg/m²   Physical Exam  Vitals and nursing note reviewed.   Constitutional:       General: He is not in acute distress.     Appearance: Normal appearance. He is well-developed. He is not ill-appearing or toxic-appearing.   HENT:      Head: Normocephalic and atraumatic.      Right Ear: Tympanic membrane, ear canal and external ear normal.      Left Ear: Tympanic membrane, ear canal and external ear normal.      Nose: Nose normal.      Mouth/Throat:      Lips: Pink.      Mouth: Mucous membranes are moist.      Pharynx: No oropharyngeal exudate or posterior oropharyngeal erythema.   Eyes:      General: No scleral icterus.        Right eye: No discharge.         Left eye: No discharge.      Extraocular Movements: Extraocular movements intact.      Conjunctiva/sclera: Conjunctivae normal.   Cardiovascular:      Rate and Rhythm: Normal rate and regular rhythm.      Pulses: Normal pulses.      Heart sounds: Normal heart sounds. No murmur heard.  Pulmonary:      Effort: Pulmonary effort is normal. No respiratory distress.      Breath sounds: Normal breath sounds. No wheezing or rales.   Abdominal:      General: Bowel sounds are normal. There is no distension.      Palpations: Abdomen is soft. There is no mass.      Tenderness: There is no abdominal tenderness. There is no right CVA tenderness, left CVA tenderness, guarding or rebound.      Hernia: No hernia is present.   Musculoskeletal:      Cervical back: Normal range of motion and neck supple. No rigidity or tenderness.   Lymphadenopathy:      Cervical: No cervical adenopathy.   Skin:     General: Skin is " warm and dry.   Neurological:      General: No focal deficit present.      Mental Status: He is alert. Mental status is at baseline.   Psychiatric:         Mood and Affect: Mood normal.         Behavior: Behavior normal. Behavior is cooperative.         ASSESSMENT/PLAN    1. Routine medical exam  -     CBC Without Differential; Future; Expected date: 01/24/2024  -     Comprehensive Metabolic Panel; Future; Expected date: 01/24/2024  -     Lipid Panel; Future  -     TSH; Future; Expected date: 02/24/2024  -     Hemoglobin A1C; Future    2. Mixed hyperlipidemia  Assessment & Plan:  Low-fat diet.  Weight loss by calorie restriction and exercise.  Continue statin.  Monitor        3. History of depression  Assessment & Plan:  Denies current issues.  Monitor.        4. Vaccine counseling    5. Cigarette smoker motivated to quit  Comments:  Cessation discussed.  Trial of Chantix.  Notify us in 3 weeks if doing well and we will send continuation pack    6. Erectile dysfunction, unspecified erectile dysfunction type  Comments:  Viagra trial.  Side effects discussed.  Monitor.    7. Non-ulcer dyspepsia  Comments:  GERD hygiene.  Continue PPI.  Consider moving to H2 blocker in seeing if symptoms are relieved with this instead    Other orders  -     sildenafiL (VIAGRA) 100 MG tablet; Take 1 tablet (100 mg total) by mouth daily as needed for Erectile Dysfunction.  Dispense: 30 tablet; Refill: 11  -     omeprazole (PRILOSEC) 40 MG capsule; Take 1 capsule (40 mg total) by mouth once daily.  Dispense: 90 capsule; Refill: 3  -     varenicline (CHANTIX STARTING MONTH BOX) 0.5 mg (11)- 1 mg (42) tablet; Take one 0.5mg tab by mouth once daily X3 days,then increase to one 0.5mg tab twice daily X4 days,then increase to one 1mg tab twice daily  Dispense: 1 each; Refill: 0          Most recent some lab results reviewed with patient.  Any new prescription medications gone over in detail including reason for taking the medication, most  common possible side effects and possible costs, etcetera.    Chronic conditions updated. Other than changes or additions as above, cont current medications and maintain follow-up with specialists if indicated.     Federico Munoz MD  A dictation device was used to produce this document. Use of such devices sometimes results in grammatical errors or replacement of words that sound similarly.

## 2024-01-25 PROBLEM — Z86.59 HISTORY OF DEPRESSION: Status: ACTIVE | Noted: 2024-01-25

## 2024-01-28 PROBLEM — S61.432A PUNCTURE WOUND OF LEFT HAND WITHOUT FOREIGN BODY: Status: ACTIVE | Noted: 2024-01-28

## 2024-02-07 ENCOUNTER — OFFICE VISIT (OUTPATIENT)
Dept: PRIMARY CARE CLINIC | Facility: CLINIC | Age: 39
End: 2024-02-07
Payer: COMMERCIAL

## 2024-02-07 ENCOUNTER — TELEPHONE (OUTPATIENT)
Dept: PRIMARY CARE CLINIC | Facility: CLINIC | Age: 39
End: 2024-02-07
Payer: COMMERCIAL

## 2024-02-07 VITALS
HEIGHT: 72 IN | TEMPERATURE: 98 F | HEART RATE: 92 BPM | BODY MASS INDEX: 28.06 KG/M2 | WEIGHT: 207.19 LBS | DIASTOLIC BLOOD PRESSURE: 80 MMHG | OXYGEN SATURATION: 98 % | SYSTOLIC BLOOD PRESSURE: 138 MMHG

## 2024-02-07 DIAGNOSIS — R20.0 FINGER NUMBNESS: Primary | ICD-10-CM

## 2024-02-07 PROCEDURE — 3075F SYST BP GE 130 - 139MM HG: CPT | Mod: CPTII,S$GLB,, | Performed by: NURSE PRACTITIONER

## 2024-02-07 PROCEDURE — 99213 OFFICE O/P EST LOW 20 MIN: CPT | Mod: S$GLB,,, | Performed by: NURSE PRACTITIONER

## 2024-02-07 PROCEDURE — 1159F MED LIST DOCD IN RCRD: CPT | Mod: CPTII,S$GLB,, | Performed by: NURSE PRACTITIONER

## 2024-02-07 PROCEDURE — 1160F RVW MEDS BY RX/DR IN RCRD: CPT | Mod: CPTII,S$GLB,, | Performed by: NURSE PRACTITIONER

## 2024-02-07 PROCEDURE — 99999 PR PBB SHADOW E&M-EST. PATIENT-LVL V: CPT | Mod: PBBFAC,,, | Performed by: NURSE PRACTITIONER

## 2024-02-07 PROCEDURE — 3044F HG A1C LEVEL LT 7.0%: CPT | Mod: CPTII,S$GLB,, | Performed by: NURSE PRACTITIONER

## 2024-02-07 PROCEDURE — 3079F DIAST BP 80-89 MM HG: CPT | Mod: CPTII,S$GLB,, | Performed by: NURSE PRACTITIONER

## 2024-02-07 PROCEDURE — 3008F BODY MASS INDEX DOCD: CPT | Mod: CPTII,S$GLB,, | Performed by: NURSE PRACTITIONER

## 2024-02-07 NOTE — TELEPHONE ENCOUNTER
Spoke with Pt just doing a ER follow from 1/28/24   If you are a male, you should use condoms if you are sexually active to prevent pregnancy in your partner while on isotretinoin. A small amount of the isotretinoin drug is present in the semen of men who take it, and it is important not to expose females to this isotretinoin during sexual intercourse. You must never share your medication with anyone else. You must also never donate your blood while on isotretinoin and for one month after. Other potential side effects:  Common side effects that may occur during the course of treatment include severely chapped lips, nose bleeds, eye dryness, dry skin, hair thinning, joint aches, and muscle aches. While patients are on isotretinoin, their skin may heal poorly or more slowly. Patients are also very sensitive to the sun and at risk of having severe sunburns while taking isotretinoin. Regular use of a lip balm, Vaseline, or Aquaphor to the lips is important to prevent excess chapping. Vaseline can be put in the nostrils at night to prevent nose bleeds. Facial moisturizers that are noncomedogenic (wont clog pores) can be used on the face and regular moisturizers can be used on the body. Patients can sometimes not use their contact lenses while on isotretinoin and may need to use eye lubricants or artificial tears. Patients can sometimes experience nearsightedness when driving at night. Over the counter ibuprofen is fine to take for muscle and joint pain. Avoid Tylenol or acetaminophen. Please notify your physician if muscle or joint pain is not controlled with over the counter ibuprofen. Avoid body piercing, and elective procedures that involve cutting or lasering the skin while on isotretinoin. Avoid prolonged sun exposure and wear sunscreen and sun protective clothing to prevent sunburns especially during spring and summer months. Other rare but more serious side effects may occur on isotretinoin.  These include depression or other mood disorders, increased production of spinal fluid, inflammation of the liver, inflammation of the pancreas, elevated cholesterol or triglyceride levels, and blood cell abnormalities. Although these side effects are rare and most patients do not develop them, patients on isotretinoin need to be monitored closely with monthly labs and monthly visits with your doctor. It is important to never drink alcohol while on isotretinoin as this may increase the likelihood of inflammation of the liver. It is important to be honest with us about any changes in your mood, depression, or suicidal thoughts while on isotretinion. We also need to be made aware of recurrent or severe headaches that do not respond to over the counter medications or are associated with blurry vision. The labs must be obtained after fasting, meaning no food or drink other than water for 12 hours before the test. We cannot refill your isotretinoin unless you return for your monthly appointments and have your monthly labs performed. If you have inflammatory bowel disease, taking isotretinoin can worsen your symptoms. Some people have developed inflammatory bowel disease while on isotretinoin or after stopping it. Studies have not concluded that there is a direct causative relationship between isotretinoin and inflammatory bowel disease. Other medications:   Patients should stop all other acne medications while on isotretinoin including both oral and topical medications. Your dermatologist will review your other medications to make sure these are safe to continue while on isotretinoin. Please notify all physicians that treat you that you are on isotretinoin so that they are aware of this when prescribing new medications. Do not take a multivitamin or vitamin A supplement while on isotretinoin.

## 2024-02-07 NOTE — PROGRESS NOTES
[facility] Note    Chief Complaint      Chief Complaint   Patient presents with    Follow-up       History of Present Illness      Nguyễn Gale Jr. is a 38 y.o. male who presents today for   Chief Complaint   Patient presents with    Follow-up         Patient presents to clinic today for follow-up visit regarding injury to left hand with a drill on 01/28/2024.  He was treated in the emergency room with an x-ray and Dermabond.  He presents today for follow-up visit.  Reports middle finger numbness at this time.  Area of wound is closed well approximated with no signs of drainage in his signs of infection noted.  We have discussed follow-up visit in 1 month for hand surgery clinic to re-evaluate numbness and middle finger.  Patient verbalizes understanding and agrees with this plan of care.         Review of Systems   Skin:         + left hand wound   Neurological:         + left hand middle finger numbness.   All 12 systems otherwise negative.       Family History:  family history includes Cirrhosis in his maternal grandfather; Colon cancer in his paternal grandfather; Colon polyps in his paternal grandfather; Diabetes in his father and paternal grandfather; Glaucoma in his maternal grandmother; Heart disease in his father and paternal grandfather; Hyperlipidemia in his father and paternal grandfather; Hypertension in his father and paternal grandfather; Liver disease in his maternal grandfather; No Known Problems in his mother; Peripheral vascular disease in his father.   Family history was reviewed with patient.     Medications:  Outpatient Encounter Medications as of 2/7/2024   Medication Sig Dispense Refill    diphth,pertus,acell,,tetanus (BOOSTRIX TDAP) 2.5-8-5 Lf-mcg-Lf/0.5mL Syrg injection Inject into the muscle. 0.5 mL 0    ibuprofen (ADVIL,MOTRIN) 800 MG tablet Take 1 tablet (800 mg total) by mouth every 6 (six) hours as needed for Pain. 20 tablet 0    multivitamin capsule Take 1  capsule by mouth once daily.      omeprazole (PRILOSEC) 40 MG capsule Take 1 capsule (40 mg total) by mouth once daily. 90 capsule 3    sildenafiL (VIAGRA) 100 MG tablet Take 1 tablet (100 mg total) by mouth daily as needed for Erectile Dysfunction. 30 tablet 11    varenicline (CHANTIX STARTING MONTH BOX) 0.5 mg (11)- 1 mg (42) tablet Take one 0.5mg tab by mouth once daily X3 days,then increase to one 0.5mg tab twice daily X4 days,then increase to one 1mg tab twice daily 1 each 0     No facility-administered encounter medications on file as of 2/7/2024.       Allergies:  Review of patient's allergies indicates:   Allergen Reactions    Plantain (jaime)      Butterfly vine       Health Maintenance:  Health Maintenance   Topic Date Due    Lipid Panel  02/03/2029    TETANUS VACCINE  01/24/2034    Hepatitis C Screening  Completed     Health Maintenance Topics with due status: Not Due       Topic Last Completion Date    TETANUS VACCINE 01/24/2024    Hemoglobin A1c (Diabetic Prevention Screening) 02/03/2024    Lipid Panel 02/03/2024       Physical Exam      Vital Signs  Temp: 98.4 °F (36.9 °C)  Pulse: 92  SpO2: 98 %  BP: 138/80  Pain Score: 0-No pain  Height and Weight  Height: 6' (182.9 cm)  Weight: 94 kg (207 lb 3.2 oz)  BSA (Calculated - sq m): 2.18 sq meters  BMI (Calculated): 28.1  Weight in (lb) to have BMI = 25: 183.9]    Physical Exam  Vitals reviewed.   Constitutional:       Appearance: Normal appearance. He is normal weight.   HENT:      Head: Normocephalic and atraumatic.      Nose: Nose normal.      Mouth/Throat:      Mouth: Mucous membranes are moist.      Pharynx: Oropharynx is clear.   Eyes:      Extraocular Movements: Extraocular movements intact.      Conjunctiva/sclera: Conjunctivae normal.      Pupils: Pupils are equal, round, and reactive to light.   Cardiovascular:      Rate and Rhythm: Normal rate and regular rhythm.      Pulses: Normal pulses.      Heart sounds: Normal heart sounds.   Pulmonary:       Effort: Pulmonary effort is normal.      Breath sounds: Normal breath sounds.   Musculoskeletal:         General: Normal range of motion.      Cervical back: Normal range of motion and neck supple.   Skin:     General: Skin is warm and dry.      Capillary Refill: Capillary refill takes less than 2 seconds.      Comments: + left hand wound is well approximated with no signs of infection or signs of drainage noted.  Good capillary refill is noted.  Patient able to bend fingers and stretch fingers.   Neurological:      General: No focal deficit present.      Mental Status: He is alert and oriented to person, place, and time. Mental status is at baseline.   Psychiatric:         Mood and Affect: Mood normal.         Behavior: Behavior normal.         Thought Content: Thought content normal.         Judgment: Judgment normal.            Assessment/Plan     Nguyễn Gale Jr. is a 38 y.o.male with:    Finger numbness  -     Ambulatory referral/consult to Hand Surgery; Future; Expected date: 02/14/2024    - Can now wash hand gently with soap and water. Pat dry.    As above, continue current medications and maintain follow up with specialists.  Return to clinic as needed.    Greater than 50% of visit was spent face to face with patient.  All questions were answered to patient's satisfaction.          Karen L Spencer, NP-C [facility]                Answers submitted by the patient for this visit:  Review of Systems Questionnaire (Submitted on 2/7/2024)  activity change: No  unexpected weight change: No  neck pain: No  hearing loss: No  rhinorrhea: No  trouble swallowing: No  eye discharge: No  visual disturbance: No  chest tightness: No  wheezing: No  chest pain: No  palpitations: No  blood in stool: No  constipation: No  vomiting: No  diarrhea: No  polydipsia: No  polyuria: No  difficulty urinating: No  urgency: No  hematuria: No  joint swelling: No  arthralgias: No  headaches: No  weakness: No  confusion:  No  dysphoric mood: No

## 2024-03-02 ENCOUNTER — PATIENT MESSAGE (OUTPATIENT)
Dept: ORTHOPEDICS | Facility: CLINIC | Age: 39
End: 2024-03-02
Payer: COMMERCIAL

## 2024-03-12 ENCOUNTER — OFFICE VISIT (OUTPATIENT)
Dept: ORTHOPEDICS | Facility: CLINIC | Age: 39
End: 2024-03-12
Payer: COMMERCIAL

## 2024-03-12 VITALS — HEIGHT: 72 IN | BODY MASS INDEX: 28.07 KG/M2 | WEIGHT: 207.25 LBS

## 2024-03-12 DIAGNOSIS — S61.432A PUNCTURE WOUND OF LEFT HAND WITHOUT FOREIGN BODY, INITIAL ENCOUNTER: ICD-10-CM

## 2024-03-12 DIAGNOSIS — R20.2 PARESTHESIA OF FINGER: Primary | ICD-10-CM

## 2024-03-12 PROCEDURE — 3008F BODY MASS INDEX DOCD: CPT | Mod: CPTII,S$GLB,, | Performed by: ORTHOPAEDIC SURGERY

## 2024-03-12 PROCEDURE — 99999 PR PBB SHADOW E&M-EST. PATIENT-LVL III: CPT | Mod: PBBFAC,,, | Performed by: ORTHOPAEDIC SURGERY

## 2024-03-12 PROCEDURE — 1159F MED LIST DOCD IN RCRD: CPT | Mod: CPTII,S$GLB,, | Performed by: ORTHOPAEDIC SURGERY

## 2024-03-12 PROCEDURE — 3044F HG A1C LEVEL LT 7.0%: CPT | Mod: CPTII,S$GLB,, | Performed by: ORTHOPAEDIC SURGERY

## 2024-03-12 PROCEDURE — 99204 OFFICE O/P NEW MOD 45 MIN: CPT | Mod: S$GLB,,, | Performed by: ORTHOPAEDIC SURGERY

## 2024-03-12 NOTE — PROGRESS NOTES
[facility]  History & Physical  Orthopedics    SUBJECTIVE:     Chief Complaint:  Left middle finger numbness    Referring Provider: Anastasia Andrews NP     History of Present Illness:  Patient is a 38 y.o. right hand dominant male who presents today with complaints of left middle finger numbness following an incident on 01/28/2024 when a hot metal drill bit punctured his left volar between his middle and ring MCP aspect of hand.  Reports going to the emergency room where they applied Dermabond and Steri-Strips to puncture wound.  Reports he has been having tingling at the tip of his left middle finger for 2 months which is gradually improving.  He denies any pain or weakness of the hand.  The patient denies any fevers, chills, N/V, D/C and presents for evaluation.    Review of patient's allergies indicates:   Allergen Reactions    Plantain (jaime)      Butterfly vine       Past Medical History:   Diagnosis Date    Anxiety     GERD (gastroesophageal reflux disease)     Tobacco use      Past Surgical History:   Procedure Laterality Date    CHOLECYSTECTOMY       Family History   Problem Relation Age of Onset    No Known Problems Mother     Peripheral vascular disease Father     Diabetes Father     Heart disease Father     Hypertension Father     Hyperlipidemia Father     Cirrhosis Maternal Grandfather     Liver disease Maternal Grandfather     Heart disease Paternal Grandfather     Diabetes Paternal Grandfather     Hypertension Paternal Grandfather     Hyperlipidemia Paternal Grandfather     Colon cancer Paternal Grandfather     Colon polyps Paternal Grandfather     Glaucoma Maternal Grandmother     Amblyopia Neg Hx     Blindness Neg Hx     Cataracts Neg Hx     Macular degeneration Neg Hx     Retinal detachment Neg Hx     Strabismus Neg Hx     Celiac disease Neg Hx     Crohn's disease Neg Hx     Esophageal cancer Neg Hx     Stomach cancer Neg Hx     Ulcerative colitis Neg Hx     Melanoma Neg Hx       Social History     Tobacco Use    Smoking status: Former     Current packs/day: 1.00     Average packs/day: 1 pack/day for 5.0 years (5.0 ttl pk-yrs)     Types: Cigarettes     Passive exposure: Never    Smokeless tobacco: Never   Substance Use Topics    Alcohol use: Yes     Alcohol/week: 0.0 standard drinks of alcohol     Comment: Socially, not weekly, occasionally only     Drug use: Yes     Frequency: 7.0 times per week     Types: Marijuana        Review of Systems:  Constitutional: Denies fever/chills  Neurological: Denies numbness/tingling (any exceptions noted in orthopaedic exam)   Psychiatric/Behavioral: Denies change in normal mood  Eyes: Denies change in vision  Cardiovascular: Denies chest pain  Respiratory: Denies shortness of breath  Hematologic/Lymphatic: Denies easy bleeding/bruising   Skin: Denies new rash or skin lesions   Gastrointestinal: Denies nausea/vomitting/diarrhea, change in bowel habits, abdominal pain   Allergic/Immunologic: Denies adverse reactions to current medications  Musculoskeletal: see HPI      OBJECTIVE:     Vital Signs (Most Recent)       Physical Exam:  Gen:  No acute distress  CV:  Peripherally well-perfused.  Pulses 2+ bilaterally.  Lungs:  Normal respiratory effort.  Abdomen:  Soft, non-tender, non-distended  Head/Neck:  Normocephalic.  Atraumatic. No TTP, AROM and PROM intact without pain  Neuro:  CN intact without deficit, SILT throughout B/L Upper & Lower Extremities    Bilateral Hand/Wrist Examination:    Observation/Inspection:  Swelling  none    Deformity  none  Discoloration  none     Scars   Left volar aspect between ring and middle MCP with healed puncture wound. + TTP on left volar aspect of hand (scar)   Atrophy  none  > 10 discrimination on 2 point discrimination test at proximal of left middle finger DIP.  Normal discrimination at DIP.  Decreased sensation on ulnar aspect of left middle finger.  Neurovascular Exam:  Digits WWP, brisk CR < 3s throughout  NVI  motor/LTS to M/R/U nerves, radial pulse 2+      ROM hand full, painless    ROM wrist full, painless    ROM elbow full, painless    Abdomen not guarded  Respirations nonlabored  Perfusion intact    Diagnostic Results:   Xray  left hand 01/28/2024  FINDINGS:  There are soft tissue lacerations with foci of soft tissue gas in the dorsal aspects of the hand, overlying the metacarpophalangeal joints.  There are several metallic radiopaque foreign bodies versus external objects overlying the base of the thumb and the distal radius.  There is also overlying dressing material.  There is no acute fracture or dislocation.  Alignment is normal.  Joint spaces are preserved.     Impression:     Several radiopaque metallic foreign bodies versus external objects overlying the base of the thumb and the distal radius.     Soft tissue lacerations of the dorsal aspects of the metacarpophalangeal joints.       Imaging - I independently viewed the patient's imaging as well as the radiology report.  Xrays of the patient's  does not demonstrate no evidence of any acute fractures or dislocations or significant degenerative changes.        ASSESSMENT/PLAN:     Nguyễn was seen today for finger injury and numbness.    Diagnoses and all orders for this visit:    Paresthesia of finger    Puncture wound of left hand without foreign body, initial encounter         38 y.o. yo male with left middle finger numbness  Nguyễn was seen today for finger injury and numbness.    Diagnoses and all orders for this visit:    Paresthesia of finger    Puncture wound of left hand without foreign body, initial encounter       Based off the HPI and exam today and 2 point discrimination test , I feel that surgical intervention is not needed.  Due to improvement of his numbness and tingling of his left middle DIP, I believe that this would in prove with time and we should continue observation.  Follow-up as needed.

## 2024-03-13 NOTE — PROGRESS NOTES
Puncture wound of left hand without foreign body, initial encounter           38 y.o. yo male with left middle finger numbness  Nguyễn was seen today for finger injury and numbness.     Diagnoses and all orders for this visit:     Paresthesia of finger     Puncture wound of left hand without foreign body, initial encounter        Based off the HPI and exam today and 2 point discrimination test , I feel that surgical intervention is not needed.  Due to improvement of his numbness and tingling of his left middle DIP, I believe that this would in prove with time and we should continue observation.  Follow-up as needed.  Patient states the symptoms are resolving he actually had numbness now he has some tingling in sensation is returning slightly we talked about nerve regeneration nerve repair recovery in the length of it patient understands he will follow up here in

## 2024-03-20 ENCOUNTER — OFFICE VISIT (OUTPATIENT)
Dept: ORTHOPEDICS | Facility: CLINIC | Age: 39
End: 2024-03-20
Payer: COMMERCIAL

## 2024-03-20 VITALS — HEIGHT: 72 IN | WEIGHT: 207.25 LBS | BODY MASS INDEX: 28.07 KG/M2

## 2024-03-20 DIAGNOSIS — R52 PAIN: Primary | ICD-10-CM

## 2024-03-20 DIAGNOSIS — R20.0 FINGER NUMBNESS: ICD-10-CM

## 2024-03-20 PROCEDURE — 1159F MED LIST DOCD IN RCRD: CPT | Mod: CPTII,S$GLB,, | Performed by: SPECIALIST/TECHNOLOGIST

## 2024-03-20 PROCEDURE — 99214 OFFICE O/P EST MOD 30 MIN: CPT | Mod: S$GLB,,, | Performed by: SPECIALIST/TECHNOLOGIST

## 2024-03-20 PROCEDURE — 99999 PR PBB SHADOW E&M-EST. PATIENT-LVL III: CPT | Mod: PBBFAC,,, | Performed by: SPECIALIST/TECHNOLOGIST

## 2024-03-20 PROCEDURE — 3008F BODY MASS INDEX DOCD: CPT | Mod: CPTII,S$GLB,, | Performed by: SPECIALIST/TECHNOLOGIST

## 2024-03-20 PROCEDURE — 3044F HG A1C LEVEL LT 7.0%: CPT | Mod: CPTII,S$GLB,, | Performed by: SPECIALIST/TECHNOLOGIST

## 2024-03-20 NOTE — PROGRESS NOTES
[facility]  History & Physical  Orthopedics    SUBJECTIVE:     Chief Complaint:  Left middle finger numbness    Referring Provider: Self, Aaareferral     History of Present Illness:  3/12/24  Patient is a 38 y.o. right hand dominant male who presents today with complaints of left middle finger numbness following an incident on 01/28/2024 when a hot metal drill bit punctured his left volar between his middle and ring MCP aspect of hand.  Reports going to the emergency room where they applied Dermabond and Steri-Strips to puncture wound.  Reports he has been having tingling at the tip of his left middle finger for 2 months which is gradually improving.  He denies any pain or weakness of the hand.  The patient denies any fevers, chills, N/V, D/C and presents for evaluation.    3/20/24  Patient reports for follow up of left middle finger numbness and tingling along her the ulnar aspect.  He states that his numbness is slowly improving.  Does note that he still has consistent numbness and tingling along the entire ulnar aspect of the middle finger.    Review of patient's allergies indicates:   Allergen Reactions    Plantain (jaime)      Butterfly vine       Past Medical History:   Diagnosis Date    Anxiety     GERD (gastroesophageal reflux disease)     Tobacco use      Past Surgical History:   Procedure Laterality Date    CHOLECYSTECTOMY       Family History   Problem Relation Age of Onset    No Known Problems Mother     Peripheral vascular disease Father     Diabetes Father     Heart disease Father     Hypertension Father     Hyperlipidemia Father     Cirrhosis Maternal Grandfather     Liver disease Maternal Grandfather     Heart disease Paternal Grandfather     Diabetes Paternal Grandfather     Hypertension Paternal Grandfather     Hyperlipidemia Paternal Grandfather     Colon cancer Paternal Grandfather     Colon polyps Paternal Grandfather     Glaucoma Maternal Grandmother     Amblyopia Neg Hx      Blindness Neg Hx     Cataracts Neg Hx     Macular degeneration Neg Hx     Retinal detachment Neg Hx     Strabismus Neg Hx     Celiac disease Neg Hx     Crohn's disease Neg Hx     Esophageal cancer Neg Hx     Stomach cancer Neg Hx     Ulcerative colitis Neg Hx     Melanoma Neg Hx      Social History     Tobacco Use    Smoking status: Former     Current packs/day: 1.00     Average packs/day: 1 pack/day for 5.0 years (5.0 ttl pk-yrs)     Types: Cigarettes     Passive exposure: Never    Smokeless tobacco: Never   Substance Use Topics    Alcohol use: Yes     Alcohol/week: 0.0 standard drinks of alcohol     Comment: Socially, not weekly, occasionally only     Drug use: Yes     Frequency: 7.0 times per week     Types: Marijuana        Review of Systems:  Constitutional: Denies fever/chills  Neurological: Denies numbness/tingling (any exceptions noted in orthopaedic exam)   Psychiatric/Behavioral: Denies change in normal mood  Eyes: Denies change in vision  Cardiovascular: Denies chest pain  Respiratory: Denies shortness of breath  Hematologic/Lymphatic: Denies easy bleeding/bruising   Skin: Denies new rash or skin lesions   Gastrointestinal: Denies nausea/vomitting/diarrhea, change in bowel habits, abdominal pain   Allergic/Immunologic: Denies adverse reactions to current medications  Musculoskeletal: see HPI      OBJECTIVE:     Vital Signs (Most Recent)       Physical Exam:  Gen:  No acute distress  CV:  Peripherally well-perfused.  Pulses 2+ bilaterally.  Lungs:  Normal respiratory effort.  Abdomen:  Soft, non-tender, non-distended  Head/Neck:  Normocephalic.  Atraumatic. No TTP, AROM and PROM intact without pain  Neuro:  CN intact without deficit, SILT throughout B/L Upper & Lower Extremities    Bilateral Hand/Wrist Examination:    Observation/Inspection:  Swelling  none    Deformity  none  Discoloration  none     Scars   Left volar aspect between ring and middle MCP with healed puncture wound. + TTP on left volar  aspect of hand (scar)   Atrophy  none  9 mm discrimination on 2 point discrimination test at proximal of left middle finger DIP.  Normal discrimination at DIP.  Decreased sensation on ulnar aspect of left middle finger.    Neurovascular Exam:  Digits WWP, brisk CR < 3s throughout  NVI motor/LTS to M/R/U nerves, radial pulse 2+      ROM hand full, painless    ROM wrist full, painless    ROM elbow full, painless    Abdomen not guarded  Respirations nonlabored  Perfusion intact    Diagnostic Results:   Xray  left hand 01/28/2024  FINDINGS:  There are soft tissue lacerations with foci of soft tissue gas in the dorsal aspects of the hand, overlying the metacarpophalangeal joints.  There are several metallic radiopaque foreign bodies versus external objects overlying the base of the thumb and the distal radius.  There is also overlying dressing material.  There is no acute fracture or dislocation.  Alignment is normal.  Joint spaces are preserved.     Impression:     Several radiopaque metallic foreign bodies versus external objects overlying the base of the thumb and the distal radius.     Soft tissue lacerations of the dorsal aspects of the metacarpophalangeal joints.       Imaging - I independently viewed the patient's imaging as well as the radiology report.  Xrays of the patient's  does not demonstrate no evidence of any acute fractures or dislocations or significant degenerative changes.        ASSESSMENT/PLAN:     Nguyễn was seen today for finger injury and tingling.    Diagnoses and all orders for this visit:    Finger numbness  -     Ambulatory referral/consult to Hand Surgery         38 y.o. yo male with left middle finger numbness  Nguyễn was seen today for finger injury and tingling.    Diagnoses and all orders for this visit:    Finger numbness  -     Ambulatory referral/consult to Hand Surgery       Improvement in 2 point discrimination test compared to one-week ago.  We will continue to monitor.  Patient we  will follow up in 3 months with repeat 2 point discrimination test or sooner if his symptoms are not improving.  Patient has complaints of right elbow contracture.  We will have him make an appointment with Dr. Harris with x-rays.

## 2024-03-26 ENCOUNTER — HOSPITAL ENCOUNTER (OUTPATIENT)
Dept: RADIOLOGY | Facility: OTHER | Age: 39
Discharge: HOME | End: 2024-03-26
Attending: SPECIALIST/TECHNOLOGIST
Payer: COMMERCIAL

## 2024-03-26 ENCOUNTER — OFFICE VISIT (OUTPATIENT)
Dept: ORTHOPEDICS | Facility: CLINIC | Age: 39
End: 2024-03-26
Payer: COMMERCIAL

## 2024-03-26 DIAGNOSIS — M25.521 RIGHT ELBOW PAIN: Primary | ICD-10-CM

## 2024-03-26 DIAGNOSIS — R52 PAIN: ICD-10-CM

## 2024-03-26 PROCEDURE — 3044F HG A1C LEVEL LT 7.0%: CPT | Mod: CPTII,S$GLB,, | Performed by: ORTHOPAEDIC SURGERY

## 2024-03-26 PROCEDURE — 20605 DRAIN/INJ JOINT/BURSA W/O US: CPT | Mod: RT,S$GLB,, | Performed by: ORTHOPAEDIC SURGERY

## 2024-03-26 PROCEDURE — 73080 X-RAY EXAM OF ELBOW: CPT | Mod: 26,RT,, | Performed by: RADIOLOGY

## 2024-03-26 PROCEDURE — 73080 X-RAY EXAM OF ELBOW: CPT | Mod: TC,FY,RT

## 2024-03-26 PROCEDURE — 99214 OFFICE O/P EST MOD 30 MIN: CPT | Mod: 25,S$GLB,, | Performed by: ORTHOPAEDIC SURGERY

## 2024-03-26 PROCEDURE — 1159F MED LIST DOCD IN RCRD: CPT | Mod: CPTII,S$GLB,, | Performed by: ORTHOPAEDIC SURGERY

## 2024-03-26 PROCEDURE — 99999 PR PBB SHADOW E&M-EST. PATIENT-LVL II: CPT | Mod: PBBFAC,,, | Performed by: ORTHOPAEDIC SURGERY

## 2024-03-26 RX ADMIN — TRIAMCINOLONE ACETONIDE 60 MG: 40 INJECTION, SUSPENSION INTRA-ARTICULAR; INTRAMUSCULAR at 02:03
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
## 2024-04-04 ENCOUNTER — PATIENT MESSAGE (OUTPATIENT)
Dept: ORTHOPEDICS | Facility: CLINIC | Age: 39
End: 2024-04-04
Payer: COMMERCIAL

## 2024-04-08 RX ORDER — FLUOXETINE 10 MG/1
10 CAPSULE ORAL DAILY
Qty: 30 CAPSULE | Refills: 2 | Status: SHIPPED | OUTPATIENT
Start: 2024-04-08 | End: 2024-04-09 | Stop reason: SDUPTHER

## 2024-04-09 RX ORDER — FLUOXETINE 10 MG/1
10 CAPSULE ORAL DAILY
Qty: 30 CAPSULE | Refills: 2 | Status: SHIPPED | OUTPATIENT
Start: 2024-04-09 | End: 2025-04-09

## 2024-04-12 ENCOUNTER — CLINICAL SUPPORT (OUTPATIENT)
Dept: REHABILITATION | Facility: HOSPITAL | Age: 39
End: 2024-04-12
Payer: COMMERCIAL

## 2024-04-12 DIAGNOSIS — M25.60 DECREASED RANGE OF MOTION: Primary | ICD-10-CM

## 2024-04-12 DIAGNOSIS — M25.521 RIGHT ELBOW PAIN: ICD-10-CM

## 2024-04-12 PROCEDURE — 97165 OT EVAL LOW COMPLEX 30 MIN: CPT | Mod: PO

## 2024-04-12 PROCEDURE — 97110 THERAPEUTIC EXERCISES: CPT | Mod: PO
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
## 2024-04-12 NOTE — PATIENT INSTRUCTIONS
MOIST HEAT FOR 10-15 MINUTES  PAIN FREE EXERCISES      These instructions are for your right elbow. Switch sides for your left elbow.  Lie on your back on a bed, next to the edge. Let your right forearm and hand hang off the bed relaxed, palm up. Only your upper arm should be on the bed.  Gently straighten your arm fully until you feel a stretch in the elbow. Keep your hand relaxed.   apply moist heat for 10-15 minutes   No pain while doing this exercises, but may feel soreness once out of this position              Exhaling, bend arms up. Inhaling, extend arms down.  Repeat __15_ times. Do __3_ times per day.  Do with _5__ lb weights.     3 directions: palm up, palm down, and thumb up, each direction is 15 reps                Use a weight or a hammer. Slowly rotate hand to one side then the other, as far as possible.  Repeat __15__ times per set.  Do __3__ sessions per day.

## 2024-04-17 RX ORDER — TRIAMCINOLONE ACETONIDE 40 MG/ML
60 INJECTION, SUSPENSION INTRA-ARTICULAR; INTRAMUSCULAR
Status: DISCONTINUED | OUTPATIENT
Start: 2024-03-26 | End: 2024-04-17 | Stop reason: HOSPADM

## 2024-04-17 NOTE — PROCEDURES
Intermediate Joint Aspiration/Injection: R elbow    Date/Time: 3/26/2024 2:00 PM    Performed by: Carmen Hercules MD  Authorized by: Carmen Hercules MD    Consent Done?:  Yes (Verbal)  Indications:  Arthritis    Location:  Elbow  Site:  R elbow  Prep: Patient was prepped and draped in usual sterile fashion    Medications:  60 mg triamcinolone acetonide 40 mg/mL

## 2024-04-17 NOTE — PROGRESS NOTES
I have personally taken the history and examined this patient. I agree with the resident's note as stated above.       38 y.o. yo male with right elbow pain        Plan:   X-ray of right elbow reviewed and discussed with patient  which display degenerative change and mild deformities of the radial head and distal humerus. Today we discussed conservative treatment such as CSI and therapy and progressive static brace versus surgical intervention with interposition arthroplasty. Patient would like to proceed with conservative treatments 1st.  If symptoms continue to worsen we will order a CT scan with 3D plane of right elbow.  Static progressive brace ordered order. Order was faxed to [facility]. Contacted [facility]   Physical therapy was also ordered  to help improve his range of motion.  Patient will reach out to us if symptoms do not improve with 2- 3 months.    All questions answered.

## 2024-04-25 ENCOUNTER — CLINICAL SUPPORT (OUTPATIENT)
Dept: REHABILITATION | Facility: HOSPITAL | Age: 39
End: 2024-04-25
Payer: COMMERCIAL

## 2024-04-25 DIAGNOSIS — M25.60 DECREASED RANGE OF MOTION: Primary | ICD-10-CM

## 2024-04-25 PROCEDURE — 97530 THERAPEUTIC ACTIVITIES: CPT | Mod: PO

## 2024-04-25 PROCEDURE — 97140 MANUAL THERAPY 1/> REGIONS: CPT | Mod: PO

## 2024-04-25 PROCEDURE — 97110 THERAPEUTIC EXERCISES: CPT | Mod: PO

## 2024-04-25 NOTE — PROGRESS NOTES
"                          [facility]                    Occupational Therapy Daily Treatment Note       Date: 4/25/2024  Name: Nguyễn Gale Jr.  Clinic Number: [id number]    Therapy Diagnosis:   Encounter Diagnosis   Name Primary?    Decreased range of motion Yes     Physician: Beatriz Reyes NP    Evaluation Date: 4/12/2024  Insurance Authorization Expiration date : 12-31-24  Plan of Care Certification Period: 5-  Date of Return to MD: 6-     Visit # / Visits authorized: 1 / 1  Time In:355  Time Out: 445  Total Billable Time: 50 minutes     Precautions:  Standard      Subjective     Pt reports: " I have been doing the exercises and the PING splint all the time."    he was compliant with home exercise program given last session.   Response to previous treatment:increase motion       Pain: 0/10  Location: right elbow       Objective    received the following Thera activities  after being cleared for contradictions for 10  minutes:     Patient received LLPS for elbow extension  moist heat x 10 minutes  to increase tissue elasticity in preparation for treatment       Nguyễn   received the following manual therapy techniques to increase joint mobilization and soft tissue mobilization for 15 minutes:     myofascial cupping X 15 minutes for loosening soft tissue,  improve scar mobility, release  tissue restrictions, decrease muscle tension  and pain, improve blood flow and increase function of  tissues in flexors and extensions and bicep region  muscle       Nguyễn  participated in dynamic functional therapeutic exercises to improve functional performance while increasing strength, endurance, ROM,  and flexibility  for 18  minutes, including:    -Patient performed recip pulleys for shoulder flexion, abduction,  for 5 minutes for  stretching and to increase shoulder ROM and mobility.     Patient participated in UBE task at level 2 to increase shoulder/elbow endurance x 5 minutes    - " measurement today       Elbow  ROM: Right  Active    4/25/2024   Extension 30(+3)   Flexion 135(+5)   Supination 79(+4)   Pronation full           Home Exercises and Education Provided     Education provided:  - discussed insurance limitation  - Progress towards goals  - Pt also instructed on importance of attention to postural alignment during rest and activity to decrease compensatory movement patterns.       Written Home Exercises Provided: Patient instructed to cont prior HEP.  Exercises were reviewed and Nguyễn was able to demonstrate them prior to the end of the session.  Nguyễn demonstrated good  understanding of the HEP provided.   .   See EMR under Patient Instructions for exercises provided 4/25/2024.       Assessment     Pt would continue to benefit from skilled OT. Pt has been using PING orthosis 30 minutes in extension and 30 minutes flexion at this time. He is totaling 1.5 hours a day  since April 22, 2024    Nguyễn is progressing well towards his goals and there are no updates to goals at this time. Pt prognosis is Fair.       The patient demonstrated proper understanding  of each exercise.Pt required verbal and tactile cues for  new exercises and adjusting exercises..  Pt continues to be limited in functional and leisurely pursuits. Pain limits patients participation in ADL's. Pt is in pain when carrying out  necessary vocational tasks with supination.    Anticipated barriers to occupational therapy: chronic elbow pain x 15 years    Pt's spiritual, cultural and educational needs considered and pt agreeable to plan of care and goals.    Goals:         Goals to be met in 6-8  weeks:     1) Patient to be IND with HEP and modalities for pain managment.  2) Patient will  Increase Active Range of motion 15-20 degrees in hand/wrist to increase functional hand use for ADLs/work/leisure activities.  3)Pt will increase  strength 10-20 lbs. to improve functional grasp for ADLs/work/leisure activities.   4)  Pt will report use of elbow strap and wrist immobilizer brace to rest tendonitis and increase functional arm use.           Plan            Certification Period/Plan of care expiration: 4/12/2024 to 5/202/2024.     Outpatient Occupational Therapy 1 times weekly for 6 weeks to include the following interventions: Paraffin, Fluidotherapy, Manual therapy/joint mobilizations, Modalities for pain management, US 3 mhz, Therapeutic exercises/activities., and Strengthening.        Arina Marquez, OT  Occupational therapist, Certified Hand Therapist

## 2024-05-03 ENCOUNTER — CLINICAL SUPPORT (OUTPATIENT)
Dept: REHABILITATION | Facility: HOSPITAL | Age: 39
End: 2024-05-03
Payer: COMMERCIAL

## 2024-05-03 DIAGNOSIS — M25.60 DECREASED RANGE OF MOTION: Primary | ICD-10-CM

## 2024-05-03 PROCEDURE — 97530 THERAPEUTIC ACTIVITIES: CPT | Mod: PO

## 2024-05-03 PROCEDURE — 97110 THERAPEUTIC EXERCISES: CPT | Mod: PO

## 2024-05-03 PROCEDURE — 97140 MANUAL THERAPY 1/> REGIONS: CPT | Mod: PO

## 2024-05-03 NOTE — PROGRESS NOTES
"                          [facility]                    Occupational Therapy Daily Treatment Note       Date: 5/3/2024  Name: Nguyễn Gale Jr.  Clinic Number: [id number]    Therapy Diagnosis:   Encounter Diagnosis   Name Primary?    Decreased range of motion Yes     Physician: Beatriz Reyes NP    Evaluation Date: 4/12/2024  Insurance Authorization Expiration date : 12-31-24  Plan of Care Certification Period: 5-  Date of Return to MD: 6-     Visit # / Visits authorized: 2/20  Time In: 2  Time Out: 3  Total Billable Time: 60 minutes     Precautions:  Standard      Subjective     Pt reports: " I am PING with elbow flexion and extension and hot bathes at night in supination."      he was compliant with home exercise program given last session.   Response to previous treatment:increase motion       Pain: 0/10  Location: right elbow       Objective    received the following Thera activities  after being cleared for contradictions for 10  minutes:     Patient received LLPS for elbow extension  moist heat x 10 minutes  to increase tissue elasticity in preparation for treatment       Nguyễn   received the following manual therapy techniques to increase joint mobilization and soft tissue mobilization for 15 minutes:     myofascial cupping X 15 minutes for loosening soft tissue,  improve scar mobility, release  tissue restrictions, decrease muscle tension  and pain, improve blood flow and increase function of  tissues in flexors and extensions and bicep region  muscle       Nguyễn  participated in dynamic functional therapeutic exercises to improve functional performance while increasing strength, endurance, ROM,  and flexibility  for 18  minutes, including:    -Patient performed recip pulleys for shoulder flexion, abduction,  for 5 minutes for  stretching and to increase shoulder ROM and mobility.     Patient participated in UBE task at level 2 to increase shoulder/elbow endurance x 5 " minutes    - measurement today       Elbow  ROM: Right  Active    5/3/2024   Extension 33(+1)   Flexion 137(+2)   Supination 83(+4)   Pronation full           Home Exercises and Education Provided     Education provided:  - discussed insurance limitation  - Progress towards goals  - Pt also instructed on importance of attention to postural alignment during rest and activity to decrease compensatory movement patterns.       Written Home Exercises Provided: Patient instructed to cont prior HEP.  Exercises were reviewed and Nguyễn was able to demonstrate them prior to the end of the session.  Nguyễn demonstrated good  understanding of the HEP provided.   .   See EMR under Patient Instructions for exercises provided 4/25/2024.       Assessment     Pt would continue to benefit from skilled OT. Pt has been using PING orthosis 30 minutes in extension and 30 minutes flexion at this time. He is totaling 1.5 hours a day  since April 22, 2024    Nguyễn is progressing well towards his goals and there are no updates to goals at this time. Pt prognosis is Fair.       The patient demonstrated proper understanding  of each exercise.Pt required verbal and tactile cues for  new exercises and adjusting exercises..  Pt continues to be limited in functional and leisurely pursuits. Pain limits patients participation in ADL's. Pt is in pain when carrying out  necessary vocational tasks with supination.    Anticipated barriers to occupational therapy: chronic elbow pain x 15 years    Pt's spiritual, cultural and educational needs considered and pt agreeable to plan of care and goals.    Goals:         Goals to be met in 6-8  weeks:     1) Patient to be IND with HEP and modalities for pain managment.  2) Patient will  Increase Active Range of motion 15-20 degrees in hand/wrist to increase functional hand use for ADLs/work/leisure activities.  3)Pt will increase  strength 10-20 lbs. to improve functional grasp for ADLs/work/leisure  activities.   4) Pt will report use of elbow strap and wrist immobilizer brace to rest tendonitis and increase functional arm use.           Plan            Certification Period/Plan of care expiration: 4/12/2024 to 5/202/2024.     Outpatient Occupational Therapy 1 times weekly for 6 weeks to include the following interventions: Paraffin, Fluidotherapy, Manual therapy/joint mobilizations, Modalities for pain management, US 3 mhz, Therapeutic exercises/activities., and Strengthening.        Arina Marquez, OT  Occupational therapist, Certified Hand Therapist

## 2024-05-09 ENCOUNTER — PATIENT MESSAGE (OUTPATIENT)
Dept: REHABILITATION | Facility: HOSPITAL | Age: 39
End: 2024-05-09
Payer: COMMERCIAL

## 2024-05-17 ENCOUNTER — CLINICAL SUPPORT (OUTPATIENT)
Dept: REHABILITATION | Facility: HOSPITAL | Age: 39
End: 2024-05-17
Payer: COMMERCIAL

## 2024-05-17 DIAGNOSIS — M25.60 DECREASED RANGE OF MOTION: Primary | ICD-10-CM

## 2024-05-17 PROCEDURE — 97530 THERAPEUTIC ACTIVITIES: CPT | Mod: PO

## 2024-05-17 NOTE — PROGRESS NOTES
"                          [facility]                    Occupational Therapy Daily Treatment Note       Date: 5/17/2024  Name: Nguyễn Gale Jr.  Clinic Number: [id number]    Therapy Diagnosis:   Encounter Diagnosis   Name Primary?    Decreased range of motion Yes     Physician: Beatriz Reyes NP    Evaluation Date: 4/12/2024  Insurance Authorization Expiration date : 12-31-24  Plan of Care Certification Period: 5-  Date of Return to MD: 6-     Visit # / Visits authorized: 2/20  Time In: 2  Time Out: 3  Total Billable Time: 60 minutes     Precautions:  Standard      Subjective     Pt reports: " The brace hurts my hand now."     he was compliant with home exercise program given last session.   Response to previous treatment:increase motion       Pain: 0/10  Location: right elbow       Objective    received the following Thera activities  after being cleared for contradictions for 10  minutes:     Patient received LLPS for elbow extension  moist heat x 10 minutes  to increase tissue elasticity in preparation for treatment       Nguyễn   received the following manual therapy techniques to increase joint mobilization and soft tissue mobilization for 15 minutes:     myofascial cupping X 15 minutes for loosening soft tissue,  improve scar mobility, release  tissue restrictions, decrease muscle tension  and pain, improve blood flow and increase function of  tissues in flexors and extensions and bicep region  muscle       Nguyễn  participated in dynamic functional therapeutic exercises to improve functional performance while increasing strength, endurance, ROM,  and flexibility  for 18  minutes, including:    -Patient performed recip pulleys for shoulder flexion, abduction,  for 5 minutes for  stretching and to increase shoulder ROM and mobility.     Patient participated in UBE task at level 2 to increase shoulder/elbow endurance x 5 minutes    LLPS for elbow extension with 3#wt on " wrist and moist heat x 10 minutes         Supplied elbow pad for wrist       Elbow  ROM: Right  Active    5/3/2024   Extension 28   Flexion 137   Supination 84   Pronation full           Home Exercises and Education Provided     Education provided:  - discussed insurance limitation  - Progress towards goals  - Pt also instructed on importance of attention to postural alignment during rest and activity to decrease compensatory movement patterns.       Written Home Exercises Provided: Patient instructed to cont prior HEP.  Exercises were reviewed and Nguyễn was able to demonstrate them prior to the end of the session.  Nguyễn demonstrated good  understanding of the HEP provided.   .   See EMR under Patient Instructions for exercises provided 4/25/2024.       Assessment     Pt would continue to benefit from skilled OT. Pt to reach out to [facility] to adjust brace. Supplied padding. Slight gain noted and measured       Nguyễn is progressing well towards his goals and there are no updates to goals at this time. Pt prognosis is Fair.       The patient demonstrated proper understanding  of each exercise.Pt required verbal and tactile cues for  new exercises and adjusting exercises..  Pt continues to be limited in functional and leisurely pursuits. Pain limits patients participation in ADL's. Pt is in pain when carrying out  necessary vocational tasks with supination.    Anticipated barriers to occupational therapy: chronic elbow pain x 15 years    Pt's spiritual, cultural and educational needs considered and pt agreeable to plan of care and goals.    Goals:         Goals to be met in 6-8  weeks:     1) Patient to be IND with HEP and modalities for pain managment.  2) Patient will  Increase Active Range of motion 15-20 degrees in hand/wrist to increase functional hand use for ADLs/work/leisure activities.  3)Pt will increase  strength 10-20 lbs. to improve functional grasp for ADLs/work/leisure activities.   4) Pt will  report use of elbow strap and wrist immobilizer brace to rest tendonitis and increase functional arm use.           Plan            Certification Period/Plan of care expiration: 4/12/2024 to 5/202/2024.     Outpatient Occupational Therapy 1 times weekly for 6 weeks to include the following interventions: Paraffin, Fluidotherapy, Manual therapy/joint mobilizations, Modalities for pain management, US 3 mhz, Therapeutic exercises/activities., and Strengthening.        Arina Marquez, OT  Occupational therapist, Certified Hand Therapist

## 2024-05-30 NOTE — PROGRESS NOTES
"                          [facility]                    Occupational Therapy Daily Treatment Note       Date: 5/31/2024  Name: Nguyễn Gale Jr.  Clinic Number: [id number]    Therapy Diagnosis:   Encounter Diagnosis   Name Primary?    Decreased range of motion Yes       Physician: Beatriz Reyes NP    Evaluation Date: 4/12/2024  Insurance Authorization Expiration date : 12-31-24  Plan of Care Certification Period: 5-  Date of Return to MD: 6-     Visit # / Visits authorized: 3/200  Time In: 3  Time Out: 345  Total Billable Time: 45 minutes     Precautions:  Standard      Subjective     Pt reports: " I have been doing my exercises."     he was compliant with home exercise program given last session.   Response to previous treatment:increase motion       Pain: 0/10  Location: right elbow       Objective    received the following Thera activities  after being cleared for contradictions for 15  minutes:     Patient received LLPS for elbow extension  moist heat x 15 minutes  to increase tissue elasticity in preparation for treatment        Nguyễn  participated in dynamic functional therapeutic exercises to improve functional performance while increasing strength, endurance, ROM,  and flexibility  for 25  minutes, including:    -Patient performed recip pulleys for shoulder flexion, abduction,  for 5 minutes for  stretching and to increase shoulder ROM and mobility.     - at side with 6# wrist wt x 10 minutes    LLPS for elbow extension with 3#wt on wrist and moist heat x 10 minutes      Elbow  ROM: Right  Active    5/3/2024   Extension 28   Flexion 137   Supination 84   Pronation full           Home Exercises and Education Provided     Education provided:  - discussed insurance limitation  - Progress towards goals  - Pt also instructed on importance of attention to postural alignment during rest and activity to decrease compensatory movement patterns.       Written Home Exercises " Provided: Patient instructed to cont prior HEP.  Exercises were reviewed and Nguyễn was able to demonstrate them prior to the end of the session.  Nguyễn demonstrated good  understanding of the HEP provided.   .   See EMR under Patient Instructions for exercises provided 4/25/2024.       Assessment     Pt would continue to benefit from skilled OT. Pt has out measured the brace; it cannot flex or extend more. The padding helped. Will return PING . Will be using weighted wrist for elbow extension now.    Nguyễn is progressing well towards his goals and there are no updates to goals at this time. Pt prognosis is Fair.       Anticipated barriers to occupational therapy: chronic elbow pain x 15 years    Pt's spiritual, cultural and educational needs considered and pt agreeable to plan of care and goals.    Goals:         Goals to be met in 6-8  weeks:     1) Patient to be IND with HEP and modalities for pain managment.  2) Patient will  Increase Active Range of motion 15-20 degrees in hand/wrist to increase functional hand use for ADLs/work/leisure activities.  3)Pt will increase  strength 10-20 lbs. to improve functional grasp for ADLs/work/leisure activities.   4) Pt will report use of elbow strap and wrist immobilizer brace to rest tendonitis and increase functional arm use.        Plan            Certification Period/Plan of care expiration: 4/12/2024 to 6/20/2024.     Outpatient Occupational Therapy 1 times weekly for 6 weeks to include the following interventions: Paraffin, Fluidotherapy, Manual therapy/joint mobilizations, Modalities for pain management, US 3 mhz, Therapeutic exercises/activities., and Strengthening.        Arina Marquez, OT  Occupational therapist, Certified Hand Therapist

## 2024-05-31 ENCOUNTER — CLINICAL SUPPORT (OUTPATIENT)
Dept: REHABILITATION | Facility: HOSPITAL | Age: 39
End: 2024-05-31
Payer: COMMERCIAL

## 2024-05-31 DIAGNOSIS — M25.60 DECREASED RANGE OF MOTION: Primary | ICD-10-CM

## 2024-05-31 PROCEDURE — 97530 THERAPEUTIC ACTIVITIES: CPT | Mod: PO

## 2024-06-06 NOTE — PROGRESS NOTES
"                          [facility]                    Occupational Therapy Daily Treatment Note       Date: 6/7/2024  Name: Nguyễn Gale Jr.  Clinic Number: [id number]    Therapy Diagnosis:   Encounter Diagnosis   Name Primary?    Decreased range of motion Yes         Physician: Beatriz Reyes NP    Evaluation Date: 4/12/2024  Insurance Authorization Expiration date : 12-31-24  Plan of Care Certification Period: 5-  Date of Return to MD: 6-     Visit # / Visits authorized: 5/20  Time In: 2  Time Out: 3  Total Billable Time: 60 minutes     Precautions:  Standard      Subjective     Pt reports: " I have been my wrist weights you told me about. I do them twice daily."    he was compliant with home exercise program given last session.   Response to previous treatment:increase motion       Pain: 0/10  Location: right elbow       Objective    received the following Thera activities  after being cleared for contradictions for 15  minutes:     Patient received LLPS for elbow extension  moist heat x 15 minutes  to increase tissue elasticity in preparation for treatment        Nguyễn  participated in dynamic functional therapeutic exercises to improve functional performance while increasing strength, endurance, ROM,  and flexibility  for 25  minutes, including:    -Patient performed recip pulleys for shoulder flexion, abduction,  for 5 minutes for  stretching and to increase shoulder ROM and mobility.     - at side with 6# wrist wt x 10 minutes    LLPS for elbow extension with 3#wt on wrist and moist heat x 10 minutes      Elbow  ROM: Right  Active    5/31/2024   Extension 28   Flexion 137   Supination 84   Pronation full           Home Exercises and Education Provided     Education provided:  - discussed insurance limitation  - Progress towards goals  - Pt also instructed on importance of attention to postural alignment during rest and activity to decrease compensatory movement " patterns.       Written Home Exercises Provided: Patient instructed to cont prior HEP.  Exercises were reviewed and Nguyễn was able to demonstrate them prior to the end of the session.  Nguyễn demonstrated good  understanding of the HEP provided.   .   See EMR under Patient Instructions for exercises provided 4/25/2024.       Assessment     Pt would continue to benefit from skilled OT. Pt has been using wrist weights with arm at side per recommendation . He feels this has really been helping him not shift shoulder anterior elevation . Pt reports less pain in elbow with work since coming to therapy.     Nguyễn is progressing well towards his goals and there are no updates to goals at this time. Pt prognosis is Fair.       Anticipated barriers to occupational therapy: chronic elbow pain x 15 years    Pt's spiritual, cultural and educational needs considered and pt agreeable to plan of care and goals.    Goals:         Goals to be met in 6-8  weeks:     1) Patient to be IND with HEP and modalities for pain managment.  2) Patient will  Increase Active Range of motion 15-20 degrees in hand/wrist to increase functional hand use for ADLs/work/leisure activities.  3)Pt will increase  strength 10-20 lbs. to improve functional grasp for ADLs/work/leisure activities.   4) Pt will report use of elbow strap and wrist immobilizer brace to rest tendonitis and increase functional arm use.        Plan            Certification Period/Plan of care expiration: 4/12/2024 to 6/20/2024.     Outpatient Occupational Therapy 1 times weekly for 6 weeks to include the following interventions: Paraffin, Fluidotherapy, Manual therapy/joint mobilizations, Modalities for pain management, US 3 mhz, Therapeutic exercises/activities., and Strengthening.        Arina Marquez OT  Occupational therapist, Certified Hand Therapist

## 2024-06-07 ENCOUNTER — CLINICAL SUPPORT (OUTPATIENT)
Dept: REHABILITATION | Facility: HOSPITAL | Age: 39
End: 2024-06-07
Payer: COMMERCIAL

## 2024-06-07 DIAGNOSIS — M25.60 DECREASED RANGE OF MOTION: Primary | ICD-10-CM

## 2024-06-07 PROCEDURE — 97530 THERAPEUTIC ACTIVITIES: CPT | Mod: PO

## 2024-06-14 ENCOUNTER — CLINICAL SUPPORT (OUTPATIENT)
Dept: REHABILITATION | Facility: HOSPITAL | Age: 39
End: 2024-06-14
Payer: COMMERCIAL

## 2024-06-14 DIAGNOSIS — M25.60 DECREASED RANGE OF MOTION: Primary | ICD-10-CM

## 2024-06-14 PROCEDURE — 97530 THERAPEUTIC ACTIVITIES: CPT | Mod: PO

## 2024-06-14 NOTE — PROGRESS NOTES
"                          [facility]                    Occupational Therapy Daily Treatment Note       Date: 6/14/2024  Name: Nguyễn Gale Jr.  Clinic Number: [id number]    Therapy Diagnosis:   Encounter Diagnosis   Name Primary?    Decreased range of motion Yes         Physician: Beatriz Reyes NP    Evaluation Date: 4/12/2024  Insurance Authorization Expiration date : 12-31-24  Plan of Care Certification Period: 5-  Date of Return to MD: 6-     Visit # / Visits authorized: 6/20  Time In: 245  Time Out: 330  Total Billable Time: 45 minutes     Precautions:  Standard      Subjective     Pt reports: " I have been my wrist weights you told me about. I do them twice daily."    he was compliant with home exercise program given last session.   Response to previous treatment:increase motion       Pain: 0/10  Location: right elbow       Objective    received the following Thera activities  after being cleared for contradictions for 15  minutes:     Patient received LLPS for elbow extension  moist heat x 15 minutes  to increase tissue elasticity in preparation for treatment        Nguyễn  participated in dynamic functional therapeutic exercises to improve functional performance while increasing strength, endurance, ROM,  and flexibility  for 25  minutes, including:    -Patient performed recip pulleys for shoulder flexion, abduction,  for 5 minutes for  stretching and to increase shoulder ROM and mobility.     - at side with 6# wrist wt x 10 minutes    LLPS for elbow extension with 3#wt on wrist and moist heat x 10 minutes      Elbow  ROM: Right  Active    6/14/2024   Extension 26(+7)   Flexion 137(+7)   Supination 80(+5)   Pronation full       comments: hurt his right arm  yesterday and is sore now , supination was 9 degrees              Strength: (QUINTEN Dynamometer in lbs.) Average 3 trials, Position II:       6/14/2024 4/12/2024   Rung 2 Right  Left   QUINTEN # 2 140 #(+10) 135# "         Home Exercises and Education Provided     Education provided:  - discussed insurance limitation  - Progress towards goals  - Pt also instructed on importance of attention to postural alignment during rest and activity to decrease compensatory movement patterns.       Written Home Exercises Provided: Patient instructed to cont prior HEP.  Exercises were reviewed and Nguyễn was able to demonstrate them prior to the end of the session.  Nguyễn demonstrated good  understanding of the HEP provided.   .   See EMR under Patient Instructions for exercises provided 4/25/2024.       Assessment     Pt would continue to benefit from skilled OT. Pt gained about 19 degrees in elbow motion. He is appearing to plateau at this time.     Nguyễn is progressing well towards his goals and there are no updates to goals at this time. Pt prognosis is Fair.       Anticipated barriers to occupational therapy: chronic elbow pain x 15 years    Pt's spiritual, cultural and educational needs considered and pt agreeable to plan of care and goals.    Goals:         Goals to be met in 6-8  weeks:     1) Patient to be IND with HEP and modalities for pain managment.(Met)  2) Patient will  Increase Active Range of motion 15-20 degrees in hand/wrist to increase functional hand use for ADLs/work/leisure activities.(Not met)  3)Pt will increase  strength 10-20 lbs. to improve functional grasp for ADLs/work/leisure activities. (Met)  4) Pt will report use of elbow strap and wrist immobilizer brace to rest tendonitis and increase functional arm use. Not met        Plan       Patient gained about 19 degrees total. He is still having pain with supination and rotation when turning wrenches. He has gained 10 pounds  strength. Has met half of goals at this time. Will see Cristo Bowers for re-evaluation of his elbow next week.        Arina Marquez, OT  Occupational therapist, Certified Hand Therapist

## 2024-06-19 ENCOUNTER — TELEPHONE (OUTPATIENT)
Dept: ORTHOPEDICS | Facility: CLINIC | Age: 39
End: 2024-06-19
Payer: COMMERCIAL

## 2024-06-20 ENCOUNTER — TELEPHONE (OUTPATIENT)
Dept: ORTHOPEDICS | Facility: CLINIC | Age: 39
End: 2024-06-20

## 2024-06-20 NOTE — TELEPHONE ENCOUNTER
Spoke with pt he stated he didn't need to come in for his finger on today its healing well he want to know what's the next step with his arm so I got him schedule with  on 6/27/24

## 2024-06-27 ENCOUNTER — OFFICE VISIT (OUTPATIENT)
Dept: ORTHOPEDICS | Facility: CLINIC | Age: 39
End: 2024-06-27
Payer: COMMERCIAL

## 2024-06-27 DIAGNOSIS — M19.021 ARTHRITIS OF RIGHT ELBOW: ICD-10-CM

## 2024-06-27 DIAGNOSIS — M25.621 DECREASED RANGE OF MOTION OF RIGHT ELBOW: ICD-10-CM

## 2024-06-27 DIAGNOSIS — M25.521 RIGHT ELBOW PAIN: Primary | ICD-10-CM

## 2024-06-27 PROCEDURE — 99214 OFFICE O/P EST MOD 30 MIN: CPT | Mod: S$GLB,,, | Performed by: ORTHOPAEDIC SURGERY

## 2024-06-27 PROCEDURE — 1159F MED LIST DOCD IN RCRD: CPT | Mod: CPTII,S$GLB,, | Performed by: ORTHOPAEDIC SURGERY

## 2024-06-27 PROCEDURE — 1160F RVW MEDS BY RX/DR IN RCRD: CPT | Mod: CPTII,S$GLB,, | Performed by: ORTHOPAEDIC SURGERY

## 2024-06-27 PROCEDURE — 3044F HG A1C LEVEL LT 7.0%: CPT | Mod: CPTII,S$GLB,, | Performed by: ORTHOPAEDIC SURGERY

## 2024-06-27 PROCEDURE — 99999 PR PBB SHADOW E&M-EST. PATIENT-LVL II: CPT | Mod: PBBFAC,,, | Performed by: ORTHOPAEDIC SURGERY
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
## 2024-06-27 NOTE — H&P (VIEW-ONLY)
Severe elbow arthritis that involves radial head capitellum trochlea and olecranon he has tried injections which did very well but he continues to have pain he has gone through therapy feels like his range of motion is slightly improved but it is still painful    On x-rays he has severe elbow arthritis     Plan again he is really too young for an elbow arthroplasty based on examination he is more tender to palpation over the radial head I do think at this time we will get a CT and pursue a radial head arthroplasty to see what % of pain is removed if he continues to have pain after that and interposition arthroplasty may be beneficial

## 2024-07-01 ENCOUNTER — HOSPITAL ENCOUNTER (OUTPATIENT)
Dept: RADIOLOGY | Facility: HOSPITAL | Age: 39
Discharge: HOME | End: 2024-07-01
Payer: COMMERCIAL

## 2024-07-01 DIAGNOSIS — M25.521 RIGHT ELBOW PAIN: ICD-10-CM

## 2024-07-01 DIAGNOSIS — R52 PAIN: ICD-10-CM

## 2024-07-01 PROCEDURE — 73200 CT UPPER EXTREMITY W/O DYE: CPT | Mod: TC,RT

## 2024-07-01 PROCEDURE — 76376 3D RENDER W/INTRP POSTPROCES: CPT | Mod: TC

## 2024-07-01 PROCEDURE — 73200 CT UPPER EXTREMITY W/O DYE: CPT | Mod: 26,RT,, | Performed by: RADIOLOGY

## 2024-07-02 DIAGNOSIS — M25.521 RIGHT ELBOW PAIN: ICD-10-CM

## 2024-07-02 DIAGNOSIS — M19.021 ARTHRITIS OF RIGHT ELBOW: Primary | ICD-10-CM

## 2024-07-08 ENCOUNTER — ANESTHESIA EVENT (OUTPATIENT)
Dept: SURGERY | Facility: HOSPITAL | Age: 39
End: 2024-07-08
Payer: COMMERCIAL

## 2024-07-15 RX ORDER — ACETAMINOPHEN 500 MG
1000 TABLET ORAL 2 TIMES DAILY PRN
Qty: 50 TABLET | Refills: 0 | Status: SHIPPED | OUTPATIENT
Start: 2024-07-15

## 2024-07-15 RX ORDER — OXYCODONE AND ACETAMINOPHEN 5; 325 MG/1; MG/1
1 TABLET ORAL
Qty: 10 TABLET | Refills: 0 | Status: SHIPPED | OUTPATIENT
Start: 2024-07-15

## 2024-07-15 RX ORDER — IBUPROFEN 600 MG/1
600 TABLET ORAL 3 TIMES DAILY PRN
Qty: 45 TABLET | Refills: 0 | Status: SHIPPED | OUTPATIENT
Start: 2024-07-15

## 2024-07-18 ENCOUNTER — TELEPHONE (OUTPATIENT)
Dept: ORTHOPEDICS | Facility: CLINIC | Age: 39
End: 2024-07-18
Payer: COMMERCIAL

## 2024-07-18 PROBLEM — M19.021 ARTHRITIS OF RIGHT ELBOW: Status: ACTIVE | Noted: 2024-07-18

## 2024-07-18 NOTE — TELEPHONE ENCOUNTER
Spoke c pt. Informed pt of 7:30 a.m. arrival time for 07/19/24 surgery at the [facility]. Reminded pt of NPO status & PO appt. Pt expressed understanding & was thankful.

## 2024-07-18 NOTE — TELEPHONE ENCOUNTER
Spoke c pt. Informed pt of 6:30 a.m. arrival time for 07/19/24 surgery at the [facility]. Reminded pt of NPO status & PO appt. Pt expressed understanding & was thankful.

## 2024-07-19 ENCOUNTER — HOSPITAL ENCOUNTER (OUTPATIENT)
Facility: HOSPITAL | Age: 39
Discharge: HOME | End: 2024-07-19
Attending: ORTHOPAEDIC SURGERY | Admitting: ORTHOPAEDIC SURGERY
Payer: COMMERCIAL

## 2024-07-19 ENCOUNTER — ANESTHESIA (OUTPATIENT)
Dept: SURGERY | Facility: HOSPITAL | Age: 39
End: 2024-07-19
Payer: COMMERCIAL

## 2024-07-19 VITALS
OXYGEN SATURATION: 100 % | RESPIRATION RATE: 16 BRPM | WEIGHT: 207 LBS | DIASTOLIC BLOOD PRESSURE: 77 MMHG | HEIGHT: 72 IN | SYSTOLIC BLOOD PRESSURE: 132 MMHG | BODY MASS INDEX: 28.04 KG/M2 | HEART RATE: 60 BPM | TEMPERATURE: 98 F

## 2024-07-19 DIAGNOSIS — M19.021 ARTHRITIS OF RIGHT ELBOW: Primary | ICD-10-CM

## 2024-07-19 PROCEDURE — 25000003 PHARM REV CODE 250: Performed by: STUDENT IN AN ORGANIZED HEALTH CARE EDUCATION/TRAINING PROGRAM

## 2024-07-19 PROCEDURE — 36000710: Performed by: ORTHOPAEDIC SURGERY

## 2024-07-19 PROCEDURE — 27201423 OPTIME MED/SURG SUP & DEVICES STERILE SUPPLY: Performed by: ORTHOPAEDIC SURGERY

## 2024-07-19 PROCEDURE — 37000008 HC ANESTHESIA 1ST 15 MINUTES: Performed by: ORTHOPAEDIC SURGERY

## 2024-07-19 PROCEDURE — 37000009 HC ANESTHESIA EA ADD 15 MINS: Performed by: ORTHOPAEDIC SURGERY

## 2024-07-19 PROCEDURE — 36000711: Performed by: ORTHOPAEDIC SURGERY

## 2024-07-19 PROCEDURE — 64415 NJX AA&/STRD BRCH PLXS IMG: CPT | Performed by: STUDENT IN AN ORGANIZED HEALTH CARE EDUCATION/TRAINING PROGRAM

## 2024-07-19 PROCEDURE — 25000003 PHARM REV CODE 250: Performed by: NURSE ANESTHETIST, CERTIFIED REGISTERED

## 2024-07-19 PROCEDURE — 63600175 PHARM REV CODE 636 W HCPCS: Mod: JZ,JG | Performed by: STUDENT IN AN ORGANIZED HEALTH CARE EDUCATION/TRAINING PROGRAM

## 2024-07-19 PROCEDURE — 25000003 PHARM REV CODE 250: Performed by: PHYSICIAN ASSISTANT

## 2024-07-19 PROCEDURE — 71000015 HC POSTOP RECOV 1ST HR: Performed by: ORTHOPAEDIC SURGERY

## 2024-07-19 PROCEDURE — 63600175 PHARM REV CODE 636 W HCPCS: Performed by: NURSE ANESTHETIST, CERTIFIED REGISTERED

## 2024-07-19 PROCEDURE — C1713 ANCHOR/SCREW BN/BN,TIS/BN: HCPCS | Performed by: ORTHOPAEDIC SURGERY

## 2024-07-19 PROCEDURE — 25000003 PHARM REV CODE 250: Performed by: ORTHOPAEDIC SURGERY

## 2024-07-19 PROCEDURE — 63600175 PHARM REV CODE 636 W HCPCS: Performed by: STUDENT IN AN ORGANIZED HEALTH CARE EDUCATION/TRAINING PROGRAM

## 2024-07-19 PROCEDURE — C1776 JOINT DEVICE (IMPLANTABLE): HCPCS | Performed by: ORTHOPAEDIC SURGERY

## 2024-07-19 PROCEDURE — 71000033 HC RECOVERY, INTIAL HOUR: Performed by: ORTHOPAEDIC SURGERY

## 2024-07-19 PROCEDURE — 94761 N-INVAS EAR/PLS OXIMETRY MLT: CPT

## 2024-07-19 DEVICE — FULL DOSE BONE CEMENT, 10 PACK CATALOG NUMBER IS 6191-1-010
Type: IMPLANTABLE DEVICE | Site: RADIUS | Status: FUNCTIONAL
Brand: SIMPLEX

## 2024-07-19 DEVICE — IMPLANTABLE DEVICE: Type: IMPLANTABLE DEVICE | Site: RADIUS | Status: FUNCTIONAL

## 2024-07-19 RX ORDER — CHOLECALCIFEROL (VITAMIN D3) 25 MCG
1000 TABLET ORAL DAILY
COMMUNITY

## 2024-07-19 RX ORDER — OXYCODONE HYDROCHLORIDE 5 MG/1
5 TABLET ORAL
Status: DISCONTINUED | OUTPATIENT
Start: 2024-07-19 | End: 2024-07-19 | Stop reason: HOSPADM

## 2024-07-19 RX ORDER — FENTANYL CITRATE 50 UG/ML
INJECTION, SOLUTION INTRAMUSCULAR; INTRAVENOUS
Status: DISCONTINUED | OUTPATIENT
Start: 2024-07-19 | End: 2024-07-19

## 2024-07-19 RX ORDER — FENTANYL CITRATE 50 UG/ML
100 INJECTION, SOLUTION INTRAMUSCULAR; INTRAVENOUS
Status: DISCONTINUED | OUTPATIENT
Start: 2024-07-19 | End: 2024-07-19 | Stop reason: HOSPADM

## 2024-07-19 RX ORDER — PROPOFOL 10 MG/ML
VIAL (ML) INTRAVENOUS
Status: DISCONTINUED | OUTPATIENT
Start: 2024-07-19 | End: 2024-07-19

## 2024-07-19 RX ORDER — MIDAZOLAM HYDROCHLORIDE 1 MG/ML
INJECTION INTRAMUSCULAR; INTRAVENOUS
Status: DISCONTINUED | OUTPATIENT
Start: 2024-07-19 | End: 2024-07-19

## 2024-07-19 RX ORDER — MUPIROCIN 20 MG/G
OINTMENT TOPICAL
Status: DISCONTINUED | OUTPATIENT
Start: 2024-07-19 | End: 2024-07-19 | Stop reason: HOSPADM

## 2024-07-19 RX ORDER — LIDOCAINE HYDROCHLORIDE 10 MG/ML
INJECTION, SOLUTION INTRAVENOUS
Status: DISCONTINUED | OUTPATIENT
Start: 2024-07-19 | End: 2024-07-19

## 2024-07-19 RX ORDER — CEFAZOLIN SODIUM 1 G/3ML
INJECTION, POWDER, FOR SOLUTION INTRAMUSCULAR; INTRAVENOUS
Status: DISCONTINUED | OUTPATIENT
Start: 2024-07-19 | End: 2024-07-19

## 2024-07-19 RX ORDER — DEXAMETHASONE SODIUM PHOSPHATE 4 MG/ML
INJECTION, SOLUTION INTRA-ARTICULAR; INTRALESIONAL; INTRAMUSCULAR; INTRAVENOUS; SOFT TISSUE
Status: DISCONTINUED | OUTPATIENT
Start: 2024-07-19 | End: 2024-07-19

## 2024-07-19 RX ORDER — ONDANSETRON HYDROCHLORIDE 2 MG/ML
4 INJECTION, SOLUTION INTRAVENOUS DAILY PRN
Status: DISCONTINUED | OUTPATIENT
Start: 2024-07-19 | End: 2024-07-19 | Stop reason: HOSPADM

## 2024-07-19 RX ORDER — EPHEDRINE SULFATE 50 MG/ML
INJECTION, SOLUTION INTRAVENOUS
Status: DISCONTINUED | OUTPATIENT
Start: 2024-07-19 | End: 2024-07-19

## 2024-07-19 RX ORDER — BUPIVACAINE HYDROCHLORIDE 2.5 MG/ML
INJECTION, SOLUTION EPIDURAL; INFILTRATION; INTRACAUDAL
Status: DISCONTINUED | OUTPATIENT
Start: 2024-07-19 | End: 2024-07-19

## 2024-07-19 RX ORDER — DOCUSATE SODIUM 100 MG/1
100 CAPSULE, LIQUID FILLED ORAL 2 TIMES DAILY
COMMUNITY

## 2024-07-19 RX ORDER — HALOPERIDOL 5 MG/ML
0.5 INJECTION INTRAMUSCULAR EVERY 10 MIN PRN
Status: DISCONTINUED | OUTPATIENT
Start: 2024-07-19 | End: 2024-07-19 | Stop reason: HOSPADM

## 2024-07-19 RX ORDER — ONDANSETRON HYDROCHLORIDE 2 MG/ML
INJECTION, SOLUTION INTRAVENOUS
Status: DISCONTINUED | OUTPATIENT
Start: 2024-07-19 | End: 2024-07-19

## 2024-07-19 RX ORDER — ACETAMINOPHEN 500 MG
1000 TABLET ORAL
Status: COMPLETED | OUTPATIENT
Start: 2024-07-19 | End: 2024-07-19

## 2024-07-19 RX ORDER — DEXMEDETOMIDINE HYDROCHLORIDE 100 UG/ML
INJECTION, SOLUTION INTRAVENOUS
Status: DISCONTINUED | OUTPATIENT
Start: 2024-07-19 | End: 2024-07-19

## 2024-07-19 RX ORDER — KETAMINE HCL IN 0.9 % NACL 50 MG/5 ML
SYRINGE (ML) INTRAVENOUS
Status: DISCONTINUED | OUTPATIENT
Start: 2024-07-19 | End: 2024-07-19

## 2024-07-19 RX ORDER — ASCORBIC ACID 250 MG
250 TABLET ORAL DAILY
COMMUNITY

## 2024-07-19 RX ORDER — FENTANYL CITRATE 50 UG/ML
25 INJECTION, SOLUTION INTRAMUSCULAR; INTRAVENOUS EVERY 5 MIN PRN
Status: COMPLETED | OUTPATIENT
Start: 2024-07-19 | End: 2024-07-19

## 2024-07-19 RX ORDER — BACITRACIN ZINC 500 UNIT/G
OINTMENT (GRAM) TOPICAL
Status: DISCONTINUED | OUTPATIENT
Start: 2024-07-19 | End: 2024-07-19 | Stop reason: HOSPADM

## 2024-07-19 RX ORDER — MIDAZOLAM HYDROCHLORIDE 1 MG/ML
1 INJECTION, SOLUTION INTRAMUSCULAR; INTRAVENOUS
Status: DISCONTINUED | OUTPATIENT
Start: 2024-07-19 | End: 2024-07-19 | Stop reason: HOSPADM

## 2024-07-19 RX ADMIN — GLYCOPYRROLATE 0.2 MG: 0.2 INJECTION, SOLUTION INTRAMUSCULAR; INTRAVENOUS at 10:07

## 2024-07-19 RX ADMIN — EPHEDRINE SULFATE 10 MG: 50 INJECTION INTRAVENOUS at 10:07

## 2024-07-19 RX ADMIN — CEFAZOLIN 2 G: 330 INJECTION, POWDER, FOR SOLUTION INTRAMUSCULAR; INTRAVENOUS at 10:07

## 2024-07-19 RX ADMIN — PROPOFOL 100 MG: 10 INJECTION, EMULSION INTRAVENOUS at 10:07

## 2024-07-19 RX ADMIN — MUPIROCIN: 20 OINTMENT TOPICAL at 08:07

## 2024-07-19 RX ADMIN — FENTANYL CITRATE 50 MCG: 50 INJECTION, SOLUTION INTRAMUSCULAR; INTRAVENOUS at 10:07

## 2024-07-19 RX ADMIN — OXYCODONE HYDROCHLORIDE 5 MG: 5 TABLET ORAL at 12:07

## 2024-07-19 RX ADMIN — FENTANYL CITRATE 25 MCG: 50 INJECTION INTRAMUSCULAR; INTRAVENOUS at 12:07

## 2024-07-19 RX ADMIN — DEXAMETHASONE SODIUM PHOSPHATE 4 MG: 4 INJECTION, SOLUTION INTRAMUSCULAR; INTRAVENOUS at 10:07

## 2024-07-19 RX ADMIN — SODIUM CHLORIDE, SODIUM GLUCONATE, SODIUM ACETATE, POTASSIUM CHLORIDE, MAGNESIUM CHLORIDE, SODIUM PHOSPHATE, DIBASIC, AND POTASSIUM PHOSPHATE: .53; .5; .37; .037; .03; .012; .00082 INJECTION, SOLUTION INTRAVENOUS at 11:07

## 2024-07-19 RX ADMIN — ACETAMINOPHEN 1000 MG: 500 TABLET ORAL at 08:07

## 2024-07-19 RX ADMIN — SODIUM CHLORIDE: 9 INJECTION, SOLUTION INTRAVENOUS at 09:07

## 2024-07-19 RX ADMIN — Medication 30 MG: at 11:07

## 2024-07-19 RX ADMIN — MIDAZOLAM HYDROCHLORIDE 2 MG: 1 INJECTION, SOLUTION INTRAMUSCULAR; INTRAVENOUS at 09:07

## 2024-07-19 RX ADMIN — BUPIVACAINE HYDROCHLORIDE 20 ML: 2.5 INJECTION, SOLUTION EPIDURAL; INFILTRATION; INTRACAUDAL; PERINEURAL at 12:07

## 2024-07-19 RX ADMIN — PROPOFOL 200 MG: 10 INJECTION, EMULSION INTRAVENOUS at 09:07

## 2024-07-19 RX ADMIN — ONDANSETRON 4 MG: 2 INJECTION INTRAMUSCULAR; INTRAVENOUS at 10:07

## 2024-07-19 RX ADMIN — DEXMEDETOMIDINE 20 MCG: 100 INJECTION, SOLUTION, CONCENTRATE INTRAVENOUS at 10:07

## 2024-07-19 RX ADMIN — PROPOFOL 300 MG: 10 INJECTION, EMULSION INTRAVENOUS at 10:07

## 2024-07-19 RX ADMIN — LIDOCAINE HYDROCHLORIDE 100 MG: 10 INJECTION, SOLUTION INTRAVENOUS at 09:07

## 2024-07-19 RX ADMIN — PROPOFOL 100 MG: 10 INJECTION, EMULSION INTRAVENOUS at 11:07

## 2024-07-19 NOTE — INTERVAL H&P NOTE
The patient has been examined and the H&P has been reviewed:    I concur with the findings and no changes have occurred since H&P was written.    Surgery risks, benefits and alternative options discussed and understood by patient/family.          Active Hospital Problems    Diagnosis  POA    *Arthritis of right elbow [M19.021]  Yes      Resolved Hospital Problems   No resolved problems to display.     
coffee

## 2024-07-19 NOTE — ANESTHESIA PROCEDURE NOTES
Supraclavicular single shot    Patient location during procedure: pre-op   Block not for primary anesthetic.  Reason for block: at surgeon's request and post-op pain management   Post-op Pain Location: right arm   Start time: 7/19/2024 12:41 PM  Timeout: 7/19/2024 12:40 PM   End time: 7/19/2024 12:44 PM    Staffing  Authorizing Provider: Shea Salcido MD  Performing Provider: Shea Salcido MD    Staffing  Performed by: Shea Salcido MD  Authorized by: Shea Salcido MD    Preanesthetic Checklist  Completed: patient identified, IV checked, site marked, risks and benefits discussed, surgical consent, monitors and equipment checked, pre-op evaluation and timeout performed  Peripheral Block  Patient position: supine  Prep: ChloraPrep  Patient monitoring: heart rate, cardiac monitor, continuous pulse ox, continuous capnometry and frequent blood pressure checks  Block type: supraclavicular  Laterality: right  Injection technique: single shot  Needle  Needle type: Stimuplex   Needle gauge: 22 G  Needle length: 2 in  Needle localization: anatomical landmarks and ultrasound guidance   -ultrasound image captured on disc.  Assessment  Injection assessment: negative aspiration, negative parasthesia and local visualized surrounding nerve  Paresthesia pain: none  Heart rate change: no  Slow fractionated injection: yes  Pain Tolerance: comfortable throughout block and no complaints  Medications:    Medications: bupivacaine (pf) (MARCAINE) injection 0.25% - Perineural   20 mL - 7/19/2024 12:43:00 PM    Additional Notes  VSS.  [facility] RN monitoring vitals throughout procedure.  Patient tolerated procedure well.

## 2024-07-19 NOTE — PLAN OF CARE
VSS.  Patient tolerating oral liquids without difficulty.   No apparent s&s of distress noted at this time, no complaints voiced at this time.   Discharge instructions reviewed with patient/family/friend with good verbal feedback received.   Post op medications bedside delivery, KAYLAH vidal.  Patient ready for discharge.

## 2024-07-19 NOTE — PLAN OF CARE
Poc reviewed with pt. Verbalized understanding, questions answered, reassurance, provided.     Belongings with wife

## 2024-07-19 NOTE — ANESTHESIA PROCEDURE NOTES
Intubation    Date/Time: 7/19/2024 10:00 AM    Performed by: Zulema Esteves CRNA  Authorized by: Shea Salcido MD    Intubation:     Induction:  Intravenous    Intubated:  Postinduction    Mask Ventilation:  Easy mask    Attempts:  1    Attempted By:  CRNA    Method of Intubation:  Fast track LMA    Difficult Airway Encountered?: No      Complications:  None    Airway Device:  Supraglottic airway/LMA    Airway Device Size:  4.0    Style/Cuff Inflation:  Cuffed    Inflation Amount (mL):  5    Secured at:  The lips    Placement Verified By:  Capnometry    Complicating Factors:  None    Findings Post-Intubation:  BS equal bilateral

## 2024-07-19 NOTE — TRANSFER OF CARE
Anesthesia Transfer of Care Note    Patient: Nguyễn Gale Jr.    Procedure(s) Performed: Procedure(s) (LRB):  REPLACEMENT, RADIUS, HEAD (Right)    Patient location: [facility]    Anesthesia Type: general and regional    Transport from OR: Transported from OR on room air with adequate spontaneous ventilation. Transported from OR on 6-10 L/min O2 by face mask with adequate spontaneous ventilation    Post pain: adequate analgesia    Post assessment: no apparent anesthetic complications and tolerated procedure well    Post vital signs: stable    Level of consciousness: awake    Nausea/Vomiting: no nausea/vomiting    Complications: none    Transfer of care protocol was followed      Last vitals: Visit Vitals  /69 (BP Location: Left arm, Patient Position: Lying)   Pulse 68   Temp 36.6 °C (97.9 °F) (Temporal)   Resp 16   Ht 6' (1.829 m)   Wt 93.9 kg (207 lb)   SpO2 99%   BMI 28.07 kg/m²

## 2024-07-19 NOTE — BRIEF OP NOTE
[facility] ([facility])  Brief Operative Note    Surgery Date: 7/19/2024     Surgeons and Role:     * Carmen Hercules MD - Primary    Assisting Surgeon: None    Pre-op Diagnosis:  Arthritis of right elbow [M19.021]  Right elbow pain [M25.521]    Post-op Diagnosis:  Post-Op Diagnosis Codes:     * Arthritis of right elbow [M19.021]     * Right elbow pain [M25.521]    Procedure(s) (LRB):  REPLACEMENT, RADIUS, HEAD (Right)    Anesthesia: * No anesthesia type entered *    Operative Findings: Right radial head arthroplasty     Estimated Blood Loss: <10cc    Specimens:   Specimen (24h ago, onward)      None              Discharge Note    OUTCOME: Patient tolerated treatment/procedure well without complication and is now ready for discharge.    DISPOSITION: Home or Self Care    FINAL DIAGNOSIS:  Arthritis of right elbow    FOLLOWUP: In clinic    DISCHARGE INSTRUCTIONS:    Discharge Procedure Orders   Ambulatory referral/consult to Physical/Occupational Therapy   Standing Status: Future   Referral Priority: Routine Referral Type: Physical Medicine   Referral Reason: Specialty Services Required   Requested Specialty: Occupational Therapy   Number of Visits Requested: 1     Sponge bath only until clinic visit     Keep surgical extremity elevated     Leave dressing on - Keep it clean, dry, and intact until clinic visit     Notify your health care provider if you experience any of the following:  temperature >100.4     Notify your health care provider if you experience any of the following:  persistent nausea and vomiting or diarrhea     Notify your health care provider if you experience any of the following:  severe uncontrolled pain     Activity as tolerated     Weight bearing restrictions (specify):   Order Comments: LEO TRIPP

## 2024-07-19 NOTE — ANESTHESIA PREPROCEDURE EVALUATION
07/19/2024  Pre-operative evaluation for Procedure(s) (LRB):  REPLACEMENT, RADIUS, HEAD (Right)    Nguyễn Gale Jr. is a 38 y.o. male     Patient Active Problem List   Diagnosis    Family history of diabetes mellitus (DM)    Bilateral thoracic back pain    Chronic pain of left heel    Otitis media    History of COVID-19    History of cholecystectomy    Gastroesophageal reflux disease with esophagitis    Mixed hyperlipidemia    Transient elevated blood pressure    Moderate episode of recurrent major depressive disorder    Smoker    History of depression    Puncture wound of left hand without foreign body    Decreased range of motion    Arthritis of right elbow       Review of patient's allergies indicates:   Allergen Reactions    Plantain (jaime)      Butterfly vine       No current facility-administered medications on file prior to encounter.     Current Outpatient Medications on File Prior to Encounter   Medication Sig Dispense Refill    ascorbic acid, vitamin C, (VITAMIN C) 250 MG tablet Take 250 mg by mouth once daily.      docusate sodium (COLACE) 100 MG capsule Take 100 mg by mouth 2 (two) times daily.      FLUoxetine 10 MG capsule Take 1 capsule (10 mg total) by mouth once daily. 30 capsule 2    multivitamin capsule Take 1 capsule by mouth once daily.      omeprazole (PRILOSEC) 40 MG capsule Take 1 capsule (40 mg total) by mouth once daily. 90 capsule 3    psyllium (METAMUCIL) powder Take 1 packet by mouth 3 (three) times daily.      vitamin D (VITAMIN D3) 1000 units Tab Take 1,000 Units by mouth once daily.      varenicline (CHANTIX STARTING MONTH BOX) 0.5 mg (11)- 1 mg (42) tablet Take one 0.5mg tab by mouth once daily X3 days,then increase to one 0.5mg tab twice daily X4 days,then increase to one 1mg tab twice daily 1 each 0       Past Surgical History:   Procedure Laterality Date     "CHOLECYSTECTOMY         Social History     Socioeconomic History    Marital status:    Occupational History    Occupation:     Tobacco Use    Smoking status: Former     Current packs/day: 1.00     Average packs/day: 1 pack/day for 5.0 years (5.0 ttl pk-yrs)     Types: Cigarettes     Passive exposure: Never    Smokeless tobacco: Never    Tobacco comments:     Zyn packs   Substance and Sexual Activity    Alcohol use: Not Currently    Drug use: Yes     Frequency: 7.0 times per week     Types: Marijuana     Comment: smoked yesterday 7/18/24    Sexual activity: Yes     Partners: Female     Social Determinants of Health     Financial Resource Strain: Medium Risk (1/10/2024)    Overall Financial Resource Strain (CARDIA)     Difficulty of Paying Living Expenses: Somewhat hard   Food Insecurity: Food Insecurity Present (1/10/2024)    Hunger Vital Sign     Worried About Running Out of Food in the Last Year: Sometimes true     Ran Out of Food in the Last Year: Sometimes true   Transportation Needs: No Transportation Needs (1/10/2024)    PRAPARE - Transportation     Lack of Transportation (Medical): No     Lack of Transportation (Non-Medical): No   Physical Activity: Sufficiently Active (1/10/2024)    Exercise Vital Sign     Days of Exercise per Week: 4 days     Minutes of Exercise per Session: 60 min   Stress: No Stress Concern Present (1/10/2024)    [employer] - Occupational Stress Questionnaire     Feeling of Stress : Only a little   Housing Stability: High Risk (1/10/2024)    Housing Stability Vital Sign     Unable to Pay for Housing in the Last Year: Yes     Number of Places Lived in the Last Year: 1     Unstable Housing in the Last Year: No         CBC: No results for input(s): "WBC", "RBC", "HGB", "HCT", "PLT", "MCV", "MCH", "MCHC" in the last 72 hours.    CMP: No results for input(s): "NA", "K", "CL", "CO2", "BUN", "CREATININE", "GLU", "MG", "PHOS", "CALCIUM", "ALBUMIN", " ""PROT", "ALKPHOS", "ALT", "AST", "BILITOT" in the last 72 hours.    INR  No results for input(s): "PT", "INR", "PROTIME", "APTT" in the last 72 hours.        Diagnostic Studies:      EKD Echo:  No results found for this or any previous visit.     Pre-op Assessment    I have reviewed the Patient Summary Reports.     I have reviewed the Nursing Notes. I have reviewed the NPO Status.   I have reviewed the Medications.     Review of Systems  Hepatic/GI:     GERD                 Physical Exam  General: Well nourished and Cooperative    Airway:  Mallampati: II   Mouth Opening: Normal  TM Distance: Normal  Tongue: Normal  Neck ROM: Normal ROM    Chest/Lungs:  Clear to auscultation, Normal Respiratory Rate    Heart:  Rate: Normal  Rhythm: Regular Rhythm  Sounds: Normal        Anesthesia Plan  Type of Anesthesia, risks & benefits discussed:    Anesthesia Type: Gen Natural Airway  Intra-op Monitoring Plan: Standard ASA Monitors  Post Op Pain Control Plan: multimodal analgesia and IV/PO Opioids PRN  Induction:  IV  Airway Plan: Direct and Video, Post-Induction  Informed Consent: Informed consent signed with the Patient and all parties understand the risks and agree with anesthesia plan.  All questions answered.   ASA Score: 1    Ready For Surgery From Anesthesia Perspective.     .      "

## 2024-07-22 NOTE — ANESTHESIA POSTPROCEDURE EVALUATION
Anesthesia Post Evaluation    Patient: Nguyễn Gale Jr.    Procedure(s) Performed: Procedure(s) (LRB):  REPLACEMENT, RADIUS, HEAD (Right)    Final Anesthesia Type: general      Patient location during evaluation: PACU  Patient participation: Yes- Able to Participate  Level of consciousness: awake and alert  Post-procedure vital signs: reviewed and stable  Pain management: adequate  Airway patency: patent  ANEESH mitigation strategies: Multimodal analgesia  PONV status at discharge: No PONV  Anesthetic complications: no      Cardiovascular status: blood pressure returned to baseline and hemodynamically stable  Respiratory status: unassisted  Hydration status: euvolemic  Follow-up not needed.              Vitals Value Taken Time   /75 07/19/24 1311   Temp 37 07/22/24 1350   Pulse 71 07/19/24 1316   Resp 16 07/19/24 1316   SpO2 100 % 07/19/24 1316   Vitals shown include unfiled device data.      Event Time   Out of Recovery 12:37:00         Pain/Andrew Score: No data recorded

## 2024-07-24 NOTE — OP NOTE
[facility] ([facility])  Surgery Department  Operative Note    SUMMARY     Date of Procedure: 7/19/2024     Procedure: Procedure(s) (LRB):  REPLACEMENT, RADIUS, HEAD (Right)   Ligament repair right elbow  Capsular release right elbow   Surgeons and Role:     * Carmen Hercules MD - Primary    Assisting Surgeon:  Archie    Pre-Operative Diagnosis: Arthritis of right elbow [M19.021]  Right elbow pain [M25.521]    Post-Operative Diagnosis: Post-Op Diagnosis Codes:     * Arthritis of right elbow [M19.021]     * Right elbow pain [M25.521]    Anesthesia: General    Technical Procedures Used: surgery    Description of the Findings of the Procedure:  Indication for procedure Mr Gale is a 38-year-old male who has significant arthritis of his right elbow was range of motion is fairly poor he can extend to 40° forward flexion to 110 supination is about 30° pronation is full he has failed conservative treatment specifically bracing and injections after much discussion with the patient we elected for surgical intervention he does have lateral-sided elbow pain though he has ulnar humeral arthritis he is not a candidate at his young age for a arthroplasty we will place the radial head to see how much pain relief he gets after much discussion with the patient elected for surgical intervention risks and benefits were explained in detail consent signed in clinic     Procedure in detail the correct site was marked with the patient's participation in the holding area patient was brought to the operating placed in supine position underwent general seizure a Ms. Right upper extremities prepped draped normal sterile fashion a well-padded sterile tourniquet was placed on the right upper extremity a time-out was conducted for the correct procedure to be indicated IV antibiotics given patient preoperatively before the tourniquet was insufflated we did check range of motion and to multiple pictures showing that he had very limited  extension forward flexion and supination incision was then marked out using the Kocher approach the arm was exsanguinated an Esmarch tourniquet was insufflated 250 mmHg incision was made careful dissection down to the radial head was maintain the radial head on visualization had significant amount of deformity in it Hohmann retractors were placed around the neck and using a saw the radial head was removed it was so deformed was a fairly large size larger than any of the prostate prosthetic heads and therefore we downsized based on what we would think with the standard there was capitellar wear no loose bodies were noted at that time we then used the Accu Med system and reamed into the radius was between an 8 and a 9 the 9 was too large for this neck and the 8 was slightly small we did trial to put the radial head the level of the DRUJ based on the sizing we did feel that the size 8 just because the stem would be slightly larger when we tapped the prosthesis and it did appear loose therefore the decision was made to cement the prosthesis cement was mixed gets on the back table the shaft was irrigated and dried appropriately the the cement was placed around the stem and then tapped into position it was held until the cement cured the laser lines were in line with Aria's once that was completed x-rays were showing that it was in alignment well fixed the range of motion was assessed and he still had not full extension but this most likely is from the ulnar humeral deformity but it was improved pictures were taken to document this his supination was full which was much improved from prior to surgery the areas irrigated copious amounts normal saline the ligament and extensor tendon was repaired with FiberWire suture Vicryl Monocryl Dermabond closed the skin sterile dressing was applied patient was placed in a well-padded splint tolerated suture was brought to cover room in stable condition     Postop plans patient keep  the dressing clean dry intact will see the patient back at 2 weeks time hinged elbow brace to be placed he will be starting therapy no weight-bearing for at least 6 weeks    Of note patient was assessed postoperatively before a block he his PIN was intact as well as ulnar and median.  Anesthesia then proceeded with the regional block  Significant Surgical Tasks Conducted by the Assistant(s), if Applicable: retraction    Complications: No    Estimated Blood Loss (EBL): * No values recorded between 7/19/2024 10:22 AM and 7/19/2024 12:05 PM *           Implants:   Implant Name Type Inv. Item Serial No.  Lot No. LRB No. Used Action   8.0MM X 0.0MM STEM    [employer] [id number] Right 1 Implanted   [employer] 2HEAD 26MM RIGHT    [employer] [id number] Right 1 Implanted   CEMENT BONE SURG SMPLX P RADPQ - [id number]  CEMENT BONE SURG SMPLX P RADPQ  Pixtr MILLIE. [id number] Right 1 Implanted       Specimens:   Specimen (24h ago, onward)      None                    Condition: Good    Disposition: PACU - hemodynamically stable.    Attestation: I performed the procedure.    Discharge Note    SUMMARY     Admit Date: 7/19/2024    Discharge Date and Time: 7/19/2024  1:25 PM    Hospital Course (synopsis of major diagnoses, care, treatment, and services provided during the course of the hospital stay): surgery     Final Diagnosis: Post-Op Diagnosis Codes:     * Arthritis of right elbow [M19.021]     * Right elbow pain [M25.521]    Disposition: Home or Self Care    Follow Up/Patient Instructions:     Medications:  Reconciled Home Medications:      Medication List        START taking these medications      acetaminophen 500 MG tablet  Commonly known as: TYLENOL  Take 2 tablets (1,000 mg total) by mouth 2 (two) times daily as needed for Pain. Begin post op day 3.     ibuprofen 600 MG tablet  Commonly known as: ADVIL,MOTRIN  Take 1 tablet (600 mg total) by mouth 3 (three) times daily as needed for Pain. Bedside Delivery      oxyCODONE-acetaminophen 5-325 mg per tablet  Commonly known as: PERCOCET  Take 1 tablet by mouth every 4 to 6 hours as needed for Pain ((moderate-severe)). PO day 1-2            CONTINUE taking these medications      FLUoxetine 10 MG capsule  Take 1 capsule (10 mg total) by mouth once daily.     multivitamin capsule  Take 1 capsule by mouth once daily.     omeprazole 40 MG capsule  Commonly known as: PRILOSEC  Take 1 capsule (40 mg total) by mouth once daily.     varenicline 0.5 mg (11)- 1 mg (42) tablet  Commonly known as: CHANTIX STARTING MONTH BOX  Take one 0.5mg tab by mouth once daily X3 days,then increase to one 0.5mg tab twice daily X4 days,then increase to one 1mg tab twice daily            ASK your doctor about these medications      docusate sodium 100 MG capsule  Commonly known as: COLACE  Take 100 mg by mouth 2 (two) times daily.     psyllium powder  Commonly known as: METAMUCIL  Take 1 packet by mouth 3 (three) times daily.     VITAMIN C 250 MG tablet  Generic drug: ascorbic acid (vitamin C)  Take 250 mg by mouth once daily.     vitamin D 1000 units Tab  Commonly known as: VITAMIN D3  Take 1,000 Units by mouth once daily.            Discharge Procedure Orders   Ambulatory referral/consult to Physical/Occupational Therapy   Standing Status: Future   Referral Priority: Routine Referral Type: Physical Medicine   Referral Reason: Specialty Services Required   Requested Specialty: Occupational Therapy   Number of Visits Requested: 1     Sponge bath only until clinic visit     Keep surgical extremity elevated     Leave dressing on - Keep it clean, dry, and intact until clinic visit     Notify your health care provider if you experience any of the following:  temperature >100.4     Notify your health care provider if you experience any of the following:  persistent nausea and vomiting or diarrhea     Notify your health care provider if you experience any of the following:  severe uncontrolled pain      Activity as tolerated     Weight bearing restrictions (specify):   Order Comments: LEO TRIPP

## 2024-07-29 ENCOUNTER — TELEPHONE (OUTPATIENT)
Dept: ORTHOPEDICS | Facility: CLINIC | Age: 39
End: 2024-07-29
Payer: COMMERCIAL

## 2024-08-02 ENCOUNTER — OFFICE VISIT (OUTPATIENT)
Dept: ORTHOPEDICS | Facility: CLINIC | Age: 39
End: 2024-08-02
Payer: COMMERCIAL

## 2024-08-02 ENCOUNTER — HOSPITAL ENCOUNTER (OUTPATIENT)
Dept: RADIOLOGY | Facility: OTHER | Age: 39
Discharge: HOME | End: 2024-08-02
Payer: COMMERCIAL

## 2024-08-02 VITALS — WEIGHT: 207 LBS | BODY MASS INDEX: 28.04 KG/M2 | HEIGHT: 72 IN

## 2024-08-02 DIAGNOSIS — M25.521 RIGHT ELBOW PAIN: Primary | ICD-10-CM

## 2024-08-02 DIAGNOSIS — R52 PAIN: Primary | ICD-10-CM

## 2024-08-02 DIAGNOSIS — Z98.890 POST-OPERATIVE STATE: ICD-10-CM

## 2024-08-02 DIAGNOSIS — M19.021 ARTHRITIS OF RIGHT ELBOW: ICD-10-CM

## 2024-08-02 DIAGNOSIS — M25.621 DECREASED RANGE OF MOTION OF RIGHT ELBOW: ICD-10-CM

## 2024-08-02 DIAGNOSIS — R52 PAIN: ICD-10-CM

## 2024-08-02 PROCEDURE — 99999 PR PBB SHADOW E&M-EST. PATIENT-LVL III: CPT | Mod: PBBFAC,,,

## 2024-08-02 PROCEDURE — 73080 X-RAY EXAM OF ELBOW: CPT | Mod: 26,RT,, | Performed by: RADIOLOGY

## 2024-08-02 PROCEDURE — 73080 X-RAY EXAM OF ELBOW: CPT | Mod: TC,FY,RT

## 2024-08-02 NOTE — PROGRESS NOTES
08/02/2024  Mr. Gale is here today for a post-operative visit.  He is 14  days status post  by Dr. Hercules on . He reports that he is well.  Pain is 2/10.  He is  taking pain medication as needed.  He denies fever, chills, and sweats since the time of the surgery.  He denies any numbness or tingling.  He reports he has been immobilized in a sugar-tong splint and has not lifted anything heavy.    Physical exam:    Vitals:    08/02/24 0843   Weight: 93.9 kg (207 lb 0.2 oz)   Height: 6' (1.829 m)   PainSc:   2     Vital signs are stable, patient is afebrile.  Patient is well dressed and well groomed, no acute distress.  Alert and oriented to person, place, and time.  Post op dressing taken down.  Incision is clean, dry and intact.  There is no erythema or exudate.  There is no sign of any infection. He is NVI. Sutures removed without difficulty.  Mild swelling at right elbow.  Patient able to make a full composite fist good range of motion of finger and wrist.    Assessment:   s/p REPLACEMENT, RADIUS, HEAD (Right)   Ligament repair right elbow  Capsular release right elbow   Nguyễn was seen today for post-op evaluation.    Diagnoses and all orders for this visit:    Right elbow pain  -     WRIST BRACE FOR HOME USE  Arthritis of right elbow  -     WRIST BRACE FOR HOME USE  Post-operative state  -     WRIST BRACE FOR HOME USE  Decreased range of motion of right elbow          Plan:      - PO instruction reviewed and provided to patient  Educated patient on scar massage.   Hinged brace to be worn full time .  Hinged brace set at 130 flexion and 30 extension locked position. Therapy to gradually increase  10 degree each week. Therapy : Initial evaluation 8/5, discussed with patient to continue range-of-motion of fingers and wrist.   Instructed patient to use disposable sling when he showers and to reapply hinged brace thereafter.  FU at 4 weeks with repeated XRAY   Nonweightbearing for 6 weeks.  Patient verbalized  understanding..         Postop note  Postop plans patient keep the dressing clean dry intact will see the patient back at 2 weeks time hinged elbow brace to be placed he will be starting therapy no weight-bearing for at least 6 weeks   Of note patient was assessed postoperatively before a block he his PIN was intact as well as ulnar and median.

## 2024-08-05 ENCOUNTER — CLINICAL SUPPORT (OUTPATIENT)
Dept: REHABILITATION | Facility: HOSPITAL | Age: 39
End: 2024-08-05
Attending: ORTHOPAEDIC SURGERY
Payer: COMMERCIAL

## 2024-08-05 DIAGNOSIS — M25.521 PAIN IN RIGHT ELBOW: ICD-10-CM

## 2024-08-05 DIAGNOSIS — M19.021 ARTHRITIS OF RIGHT ELBOW: ICD-10-CM

## 2024-08-05 DIAGNOSIS — M25.621 STIFFNESS OF RIGHT ELBOW JOINT: Primary | ICD-10-CM

## 2024-08-05 PROCEDURE — 97530 THERAPEUTIC ACTIVITIES: CPT | Mod: PO | Performed by: OCCUPATIONAL THERAPIST

## 2024-08-05 PROCEDURE — 97165 OT EVAL LOW COMPLEX 30 MIN: CPT | Mod: PO | Performed by: OCCUPATIONAL THERAPIST

## 2024-08-12 NOTE — PROGRESS NOTES
Occupational Therapy Daily Treatment Note     Date: 8/13/2024  Name: Nguyễn Gale Jr.  Clinic Number: [id number]    Therapy Diagnosis:   Encounter Diagnoses   Name Primary?    Stiffness of right elbow joint Yes    Pain in right elbow      Physician: Carmen Hercules, *    Physician Orders: Eval and Treat  Medical Diagnosis:   M19.021 (ICD-10-CM) - Arthritis of right elbow      Surgical Procedure and Date: R radial head replacement, 7/19/24   Evaluation Date: 8/5/2024  Insurance Authorization Period Expiration: 12/31/24  Plan of Care Certification Period: 11/1/24  Date of Return to MD: 8/30/24  Visit # / Visits authorized: 1 / 12  FOTO: 1/3     Precautions:  Standard     Time In: 1:45 PM  Time Out: 2:30 PM  Total Appointment Time (timed & untimed codes): 45 minutes      Subjective     Pt reports: Minimal pain  Response to previous treatment:Tiki well    Pain: 1/10  Location: Right elbow    Objective     Nguyễn received the following  modalities after being cleared for contraindications for 10 minutes in order to promote increased blood flow and tissue elasticity:   - right elbow      Nguyễn participated in dynamic functional therapeutic activities to improve functional performance for 30 minutes, including:  -Pathophysiology, joint protection education  -AROM EE/EF pronated, pro/sup EF at 90 degrees 20  -PROM EF 10 count x 10  -Soft tissue mobs brachioradialis       Elbow AROM  EE EF   43 126     Forearm AROM  SUP  PRO   80 71       Home Exercises and Education Provided     Education provided:   - Upgraded HEP  - Progress towards goals     Written Home Exercises Provided: yes.  Exercises were reviewed and Nguyễn was able to demonstrate them prior to the end of the session.  Nguyễn demonstrated good  understanding of the HEP provided.   .   See EMR under Patient Instructions for exercises provided 8/13/2024.        Assessment     Pt demonstrated improving RUE funcition.    Nguyễn is progressing towards his  goals and there are no updates to goals at this time. Pt prognosis is Fair.     Pt will continue to benefit from skilled outpatient occupational therapy to address the deficits listed in the problem list on initial evaluation provide pt/family education and to maximize pt's level of function.     Anticipated barriers to therapy: Premorbid anatomical anomolies      Pt's spiritual, cultural and educational needs considered and pt agreeable to plan of care and goals.    Goals:  The following goals were discussed with the patient and patient is in agreement with them as to be addressed in the treatment plan.   Short term Goals:  1) Initiate HEP  2) Pt will increase AROM of R elbow by 5-10 degrees in order to assist with ADLs by 4 weeks.  3) Pt will reduce edema by .5-1 cm in affected elbow by 4 weeks.  4) Pt will reduce pain to less than 4/10 by 4 weeks.  5) Measure  strength when appropriate  6) Patient will be able to achieve less than or equal to 50% on the FOTO, demonstrating overall improved functional ability with upper extremity. (Self-care category)     Long Term Goals:  Goals to be met by discharge:  1) Independent with HEP  2) Pt will increase R elbow SOLORZANO by 30 degrees in order to increase functional use for grasp with home management or work related tasks by d/c.   3) Pt will decrease edema to trace or none to increase functional ROM by d/c.   4) Pt will decrease pain to trace or none while completing light home management tasks or work related tasks by d/c.   5) Patient will be able to achieve less than or equal to 37% on the FOTO, demonstrating overall improved functional ability with upper extremity.  (Self-care category)       Plan   Cont OT to address above goals.        TY Bacon OTR/L, CHT

## 2024-08-13 ENCOUNTER — CLINICAL SUPPORT (OUTPATIENT)
Dept: REHABILITATION | Facility: HOSPITAL | Age: 39
End: 2024-08-13
Payer: COMMERCIAL

## 2024-08-13 DIAGNOSIS — M25.521 PAIN IN RIGHT ELBOW: ICD-10-CM

## 2024-08-13 DIAGNOSIS — M25.621 STIFFNESS OF RIGHT ELBOW JOINT: Primary | ICD-10-CM

## 2024-08-13 PROCEDURE — 97010 HOT OR COLD PACKS THERAPY: CPT | Mod: PO

## 2024-08-13 PROCEDURE — 97530 THERAPEUTIC ACTIVITIES: CPT | Mod: PO

## 2024-08-13 NOTE — PATIENT INSTRUCTIONS
Flexion (Passive)        Use other hand to bend elbow, with thumb toward same shoulder. Do NOT force this motion. Hold 10 seconds.  Repeat 10 times. Do 3 sessions per day.      Elbow Flexion / Extension        Hold arm at side palm neutral to down. Bend elbow, bringing hand toward shoulders. Then straighten to start position.  Repeat sequence 20 times. Do 5 sessions per day.    AROM: Forearm Pronation / Supination        With elbow flexed to 90 degrees, slowly rotate palm down until stretch is felt. Relax. Then rotate palm up until stretch is felt.  Repeat 20 times. Do 5 sessions per day.

## 2024-08-16 ENCOUNTER — CLINICAL SUPPORT (OUTPATIENT)
Dept: REHABILITATION | Facility: HOSPITAL | Age: 39
End: 2024-08-16
Payer: COMMERCIAL

## 2024-08-16 DIAGNOSIS — M25.521 PAIN IN RIGHT ELBOW: ICD-10-CM

## 2024-08-16 DIAGNOSIS — M25.621 STIFFNESS OF RIGHT ELBOW JOINT: Primary | ICD-10-CM

## 2024-08-16 PROCEDURE — 97530 THERAPEUTIC ACTIVITIES: CPT | Mod: PO

## 2024-08-16 PROCEDURE — 97035 APP MDLTY 1+ULTRASOUND EA 15: CPT | Mod: PO

## 2024-08-16 NOTE — PROGRESS NOTES
Occupational Therapy Daily Treatment Note     Date: 8/16/2024  Name: Nguyễn Gale Jr.  Clinic Number: [id number]    Therapy Diagnosis:   Encounter Diagnoses   Name Primary?    Stiffness of right elbow joint Yes    Pain in right elbow        Physician: Carmen Hercules, *    Physician Orders: Eval and Treat  Medical Diagnosis:   M19.021 (ICD-10-CM) - Arthritis of right elbow      Surgical Procedure and Date: R radial head replacement, 7/19/24   Evaluation Date: 8/5/2024  Insurance Authorization Period Expiration: 12/31/24  Plan of Care Certification Period: 11/1/24  Date of Return to MD: 8/30/24  Visit # / Visits authorized: 1 / 12  FOTO: 1/3     Precautions:  Standard     Time In: 2:45 PM  Time Out: 3:30 PM  Total Appointment Time (timed & untimed codes): 45 minutes      Subjective     Pt reports: Minimal pain  Response to previous treatment:Tiki well    Pain: 1/10  Location: Right elbow    Objective       US:   Patient receives ultrasound  for pain control and remold scar tissue to increase tissue elasticity @ 100 duty cycle, 3.3 Mhz, applied to right lateral elbow , intensity = 0.6 w/cm2 for 8 minutes.      Nguyễn participated in dynamic functional therapeutic activities to improve functional performance for 40 minutes, including:    -AROM EE/EF pronated, pro/sup EF at 90 degrees  x 20  -PROM EF 10 count x 10  -Soft tissue mobs brachioradialis using tool      Elbow AROM  EE EF   28 131     Forearm AROM  SUP  PRO   80 74      Strength (Dynamometer) and Pinch Strength (Pinch Gauge)  Measured in pounds.    8/16/2024 8/16/2024         Right  left       Rung II 31 135       Coyle Pinch 15   23       3pt Pinch  21 26            Home Exercises and Education Provided     Education provided:   - Upgraded HEP  - Progress towards goals     Written Home Exercises Provided: yes.  Exercises were reviewed and Nguyễn was able to demonstrate them prior to the end of the session.  Nguyễn demonstrated good  understanding  of the HEP provided.   .   See EMR under Patient Instructions for exercises provided 8/13/2024.        Assessment     Pt demonstrated improving RUE funcition.    Nguyễn is progressing towards his goals and there are no updates to goals at this time. Pt prognosis is Fair.     Pt will continue to benefit from skilled outpatient occupational therapy to address the deficits listed in the problem list on initial evaluation provide pt/family education and to maximize pt's level of function.     Anticipated barriers to therapy: Premorbid anatomical anomolies      Pt's spiritual, cultural and educational needs considered and pt agreeable to plan of care and goals.    Goals:  The following goals were discussed with the patient and patient is in agreement with them as to be addressed in the treatment plan.   Short term Goals:  1) Initiate HEP  2) Pt will increase AROM of R elbow by 5-10 degrees in order to assist with ADLs by 4 weeks.  3) Pt will reduce edema by .5-1 cm in affected elbow by 4 weeks.  4) Pt will reduce pain to less than 4/10 by 4 weeks.  5) Pt will increase  strength by 10-15 pounds for work related task   6) Patient will be able to achieve less than or equal to 50% on the FOTO, demonstrating overall improved functional ability with upper extremity. (Self-care category)   7) Pt will increase pinch strength by 1-3 to twist electrical wires.    Long Term Goals:  Goals to be met by discharge:  1) Independent with HEP  2) Pt will increase R elbow SOLORZANO by 30 degrees in order to increase functional use for grasp with home management or work related tasks by d/c.   3) Pt will decrease edema to trace or none to increase functional ROM by d/c.   4) Pt will decrease pain to trace or none while completing light home management tasks or work related tasks by d/c.   5) Patient will be able to achieve less than or equal to 37% on the FOTO, demonstrating overall improved functional ability with upper extremity.   (Self-care category)       Plan   Cont OT to address above goals.

## 2024-08-20 ENCOUNTER — CLINICAL SUPPORT (OUTPATIENT)
Dept: REHABILITATION | Facility: HOSPITAL | Age: 39
End: 2024-08-20
Payer: COMMERCIAL

## 2024-08-20 DIAGNOSIS — M25.521 PAIN IN RIGHT ELBOW: ICD-10-CM

## 2024-08-20 DIAGNOSIS — M25.621 STIFFNESS OF RIGHT ELBOW JOINT: Primary | ICD-10-CM

## 2024-08-20 PROCEDURE — 97530 THERAPEUTIC ACTIVITIES: CPT | Mod: PO

## 2024-08-27 ENCOUNTER — CLINICAL SUPPORT (OUTPATIENT)
Dept: REHABILITATION | Facility: HOSPITAL | Age: 39
End: 2024-08-27
Payer: COMMERCIAL

## 2024-08-27 DIAGNOSIS — M25.621 STIFFNESS OF RIGHT ELBOW JOINT: Primary | ICD-10-CM

## 2024-08-27 DIAGNOSIS — M25.521 PAIN IN RIGHT ELBOW: ICD-10-CM

## 2024-08-27 PROCEDURE — 97530 THERAPEUTIC ACTIVITIES: CPT | Mod: PO

## 2024-08-27 NOTE — PROGRESS NOTES
Occupational Therapy Daily Treatment Note     Date: 8/27/2024  Name: Nguyễn Gale Jr.  Clinic Number: [id number]    Therapy Diagnosis:   Encounter Diagnoses   Name Primary?    Stiffness of right elbow joint Yes    Pain in right elbow          Physician: Carmen Hercules, *    Physician Orders: Eval and Treat  Medical Diagnosis:   M19.021 (ICD-10-CM) - Arthritis of right elbow      Surgical Procedure and Date: R radial head replacement, 7/19/24   Evaluation Date: 8/5/2024  Insurance Authorization Period Expiration: 12/31/24  Plan of Care Certification Period: 11/1/24  Date of Return to MD: 8/30/24  Visit # / Visits authorized: 3 / 12    FOTO: 1/3     Precautions:  Standard     Time In: 2:30  PM  Time Out: 3:15 PM  Total Appointment Time (timed & untimed codes): 45 minutes      Subjective     Pt reports: Minimal pain  Response to previous treatment:Tiki well    Pain: 1/10  Location: Right elbow    Objective         Nguyễn participated in dynamic functional therapeutic activities to improve functional performance for 40 minutes, including:    -AROM EE/EF pronated, pro/sup EF at 90 degrees  x 20  - myofascial cupping X 10 minutes for loosening soft tissue,  improve scar mobility, release  tissue restrictions, decrease muscle tension  and pain, improve blood flow and increase function of  tissues in tricep muscle (omitted today only)   - manual massage to flexors and extensors., distal biceps  - elbow bent to 90 degrees sup and pro ( pain-free range) x 1 minute total  - measured today   -Soft tissue mobs brachioradialis using tool  - LLPS elbow extension and pronated  with moist heat and pain-free    Elbow AROM  EE EF   30 133     Forearm AROM  SUP  PRO   80 82      Strength (Dynamometer) and Pinch Strength (Pinch Gauge)  Measured in pounds.    8/27/2024 8/16/2024         Right  left       Rung  135       Coyle Pinch 15   23       3pt Pinch  21 26            Home Exercises and Education Provided      Education provided:   - Upgraded HEP  - Progress towards goals     Written Home Exercises Provided: yes.  Exercises were reviewed and Nguyễn was able to demonstrate them prior to the end of the session.  Nguyễn demonstrated good  understanding of the HEP provided.   .   See EMR under Patient Instructions for exercises provided 8/13/2024.        Assessment     Pt demonstrated improving RUE funcition. Pt increase in supination,pronation, elbow flexion and extension . Increase strength at this time. He has not been using right elbow for any pulling or pushing. He has help at work for any heavy duty tasks.      Nguyễn is progressing towards his goals and there are no updates to goals at this time. Pt prognosis is Fair.     Pt will continue to benefit from skilled outpatient occupational therapy to address the deficits listed in the problem list on initial evaluation provide pt/family education and to maximize pt's level of function.     Anticipated barriers to therapy: Premorbid anatomical anomolies      Pt's spiritual, cultural and educational needs considered and pt agreeable to plan of care and goals.    Goals:  The following goals were discussed with the patient and patient is in agreement with them as to be addressed in the treatment plan.   Short term Goals:  1) Initiate HEP  2) Pt will increase AROM of R elbow by 5-10 degrees in order to assist with ADLs by 4 weeks.  3) Pt will reduce edema by .5-1 cm in affected elbow by 4 weeks.  4) Pt will reduce pain to less than 4/10 by 4 weeks.  5) Pt will increase  strength by 10-15 pounds for work related task   6) Patient will be able to achieve less than or equal to 50% on the FOTO, demonstrating overall improved functional ability with upper extremity. (Self-care category)   7) Pt will increase pinch strength by 1-3 to twist electrical wires.    Long Term Goals:  Goals to be met by discharge:  1) Independent with HEP  2) Pt will increase R elbow SOLORZANO by 30  degrees in order to increase functional use for grasp with home management or work related tasks by d/c.   3) Pt will decrease edema to trace or none to increase functional ROM by d/c.   4) Pt will decrease pain to trace or none while completing light home management tasks or work related tasks by d/c.   5) Patient will be able to achieve less than or equal to 37% on the FOTO, demonstrating overall improved functional ability with upper extremity.  (Self-care category)       Plan   Cont OT to address above goals.

## 2024-08-29 NOTE — PROGRESS NOTES
Occupational Therapy Daily Treatment Note     Date: 8/30/2024  Name: Nguyễn Gale Jr.  Clinic Number: [id number]    Therapy Diagnosis:   Encounter Diagnoses   Name Primary?    Stiffness of right elbow joint Yes    Pain in right elbow        Physician: Carmen Hercules, *    Physician Orders: Eval and Treat  Medical Diagnosis:   M19.021 (ICD-10-CM) - Arthritis of right elbow      Surgical Procedure and Date: R radial head replacement, 7/19/24   Evaluation Date: 8/5/2024  Insurance Authorization Period Expiration: 12/31/24  Plan of Care Certification Period: 11/1/24  Date of Return to MD: 8/30/24  Visit # / Visits authorized: 5 / 12  FOTO: 1/3     Precautions:  Standard     Time In: 2:00 PM  Time Out: 2:45 PM  Total Appointment Time (timed & untimed codes): 45 minutes      Subjective     Pt reports: Feels better  Response to previous treatment:Tiki well    Pain: 1/10  Location: Right elbow    Objective     Nguyễn received the following  modalities after being cleared for contraindications for 10 minutes in order to promote increased blood flow and tissue elasticity:   -MH right elbow 1# wrist cuff      Nguyễn participated in dynamic functional therapeutic activities to improve functional performance for 30 minutes, including:  -PROM EE 10 count x 10  -UBE 3'F/3'B 3.0  -10# carry x 3'  -Blue-band EE 3x10  -Wall pushups 3x10  -Red flexbar frowns/smiles 3x10  Green flexbar WE/WF 3x10      Home Exercises and Education Provided     Education provided:   - Upgraded HEP  - Progress towards goals     Written Home Exercises Provided: yes.  Exercises were reviewed and Nguyễn was able to demonstrate them prior to the end of the session.  Nguyễn demonstrated good  understanding of the HEP provided.   .   See EMR under Patient Instructions for exercises provided 8/13/2024.        Assessment     Pt demonstrated increased activity tolerance.    Nguyễn is progressing towards his goals and there are no updates to goals at this  time. Pt prognosis is Fair.     Pt will continue to benefit from skilled outpatient occupational therapy to address the deficits listed in the problem list on initial evaluation provide pt/family education and to maximize pt's level of function.     Anticipated barriers to therapy: Premorbid anatomical anomolies      Pt's spiritual, cultural and educational needs considered and pt agreeable to plan of care and goals.    Goals:  The following goals were discussed with the patient and patient is in agreement with them as to be addressed in the treatment plan.   Short term Goals:  1) Initiate HEP. Met  2) Pt will increase AROM of R elbow by 5-10 degrees in order to assist with ADLs by 4 weeks. Progressing  3) Pt will reduce edema by .5-1 cm in affected elbow by 4 weeks. Progressing  4) Pt will reduce pain to less than 4/10 by 4 weeks. Progressing  5) Measure  strength when appropriate. Progressing  6) Patient will be able to achieve less than or equal to 50% on the FOTO, demonstrating overall improved functional ability with upper extremity. (Self-care category) Progressing     Long Term Goals:  Goals to be met by discharge:  1) Independent with HEP. Progressing  2) Pt will increase R elbow SOLORZANO by 30 degrees in order to increase functional use for grasp with home management or work related tasks by d/c. Progressing  3) Pt will decrease edema to trace or none to increase functional ROM by d/c. Progressing  4) Pt will decrease pain to trace or none while completing light home management tasks or work related tasks by d/c. Progressing  5) Patient will be able to achieve less than or equal to 37% on the FOTO, demonstrating overall improved functional ability with upper extremity.  (Self-care category). Progressing       Plan   Cont OT to address above goals.        TY Bacon OTR/L, CHT

## 2024-08-30 ENCOUNTER — HOSPITAL ENCOUNTER (OUTPATIENT)
Dept: RADIOLOGY | Facility: OTHER | Age: 39
Discharge: HOME | End: 2024-08-30
Payer: COMMERCIAL

## 2024-08-30 ENCOUNTER — OFFICE VISIT (OUTPATIENT)
Dept: ORTHOPEDICS | Facility: CLINIC | Age: 39
End: 2024-08-30
Payer: COMMERCIAL

## 2024-08-30 ENCOUNTER — CLINICAL SUPPORT (OUTPATIENT)
Dept: REHABILITATION | Facility: HOSPITAL | Age: 39
End: 2024-08-30
Payer: COMMERCIAL

## 2024-08-30 VITALS — WEIGHT: 207 LBS | BODY MASS INDEX: 28.04 KG/M2 | HEIGHT: 72 IN

## 2024-08-30 DIAGNOSIS — Z98.890 POST-OPERATIVE STATE: Primary | ICD-10-CM

## 2024-08-30 DIAGNOSIS — M25.521 PAIN IN RIGHT ELBOW: ICD-10-CM

## 2024-08-30 DIAGNOSIS — M19.021 ARTHRITIS OF RIGHT ELBOW: ICD-10-CM

## 2024-08-30 DIAGNOSIS — R52 PAIN: ICD-10-CM

## 2024-08-30 DIAGNOSIS — R52 PAIN: Primary | ICD-10-CM

## 2024-08-30 DIAGNOSIS — M25.621 STIFFNESS OF RIGHT ELBOW JOINT: Primary | ICD-10-CM

## 2024-08-30 PROCEDURE — 97010 HOT OR COLD PACKS THERAPY: CPT | Mod: PO

## 2024-08-30 PROCEDURE — 97530 THERAPEUTIC ACTIVITIES: CPT | Mod: PO

## 2024-08-30 PROCEDURE — 73080 X-RAY EXAM OF ELBOW: CPT | Mod: TC,FY,RT

## 2024-08-30 PROCEDURE — 99999 PR PBB SHADOW E&M-EST. PATIENT-LVL III: CPT | Mod: PBBFAC,,, | Performed by: SPECIALIST/TECHNOLOGIST

## 2024-08-30 PROCEDURE — 73080 X-RAY EXAM OF ELBOW: CPT | Mod: 26,RT,, | Performed by: RADIOLOGY

## 2024-08-30 NOTE — PROGRESS NOTES
8/30/24  Patient is 6 weeks status post right radial head arthroplasty.  He states that his pain is minimal.  He does state that when he received his elbow brace last time he was placed in full extension in the locked position and was unable to move.  He states this has very uncomfortable.  He notes that he has continues to progress within therapy as well.  He states that his  strength has improved as well.  He denies any numbness or tingling.    08/02/2024  Mr. Gale is here today for a post-operative visit.  He is 14  days status post  by Dr. Hercules on . He reports that he is well.  Pain is 2/10.  He is  taking pain medication as needed.  He denies fever, chills, and sweats since the time of the surgery.  He denies any numbness or tingling.  He reports he has been immobilized in a sugar-tong splint and has not lifted anything heavy.    Physical exam:    Vitals:    08/30/24 0857   Weight: 93.9 kg (207 lb 0.2 oz)   Height: 6' (1.829 m)   PainSc: 0-No pain     Vital signs are stable, patient is afebrile.  Patient is well dressed and well groomed, no acute distress.  Alert and oriented to person, place, and time.  Post op dressing taken down.  Incision is clean, dry and intact.  There is no erythema or exudate.  There is no sign of any infection. He is NVI.  Patient lacks about 30° of elbow extension.  Elbow flexion about 130°.  Supination about 60 degree, pronation hand flat on the table patient able to make composite fist    Assessment:   s/p REPLACEMENT, RADIUS, HEAD (Right)   Ligament repair right elbow  Capsular release right elbow     1. Post-operative state        2. Arthritis of right elbow            Plan:  Continue to progress range of motion.  Begin a aggressive strength progression as well.    Patient is to remain nonweightbearing through the right elbow for 6 more weeks  Patient is cleared to return back to light duty work no heavy lifting.    Therapy we will progress his weight limits.     Patient we will follow up in 6 weeks with repeat right elbow x-rays.

## 2024-09-03 ENCOUNTER — CLINICAL SUPPORT (OUTPATIENT)
Dept: REHABILITATION | Facility: HOSPITAL | Age: 39
End: 2024-09-03
Payer: COMMERCIAL

## 2024-09-03 DIAGNOSIS — M25.621 STIFFNESS OF RIGHT ELBOW JOINT: Primary | ICD-10-CM

## 2024-09-03 DIAGNOSIS — M25.521 PAIN IN RIGHT ELBOW: ICD-10-CM

## 2024-09-03 PROCEDURE — 97530 THERAPEUTIC ACTIVITIES: CPT | Mod: PO

## 2024-09-03 NOTE — PROGRESS NOTES
Occupational Therapy Daily Treatment Note     Date: 9/3/2024  Name: Nguyễn Gale Jr.  Clinic Number: [id number]    Therapy Diagnosis:   Encounter Diagnoses   Name Primary?    Stiffness of right elbow joint Yes    Pain in right elbow          Physician: Carmen Hercules, *    Physician Orders: Eval and Treat  Medical Diagnosis:   M19.021 (ICD-10-CM) - Arthritis of right elbow      Surgical Procedure and Date: R radial head replacement, 7/19/24   Evaluation Date: 8/5/2024  Insurance Authorization Period Expiration: 12/31/24  Plan of Care Certification Period: 11/1/24  Date of Return to MD:   Visit # / Visits authorized: 3 / 12    FOTO: 1/3     Precautions:  Standard     Time In: 2:30  PM  Time Out: 3:15 PM  Total Appointment Time (timed & untimed codes): 45 minutes      Subjective     Pt reports: less pain post session   Response to previous treatment:Tiki well    Pain: 1/10  Location: Right elbow    Objective         Nguyễn participated in dynamic functional therapeutic activities to improve functional performance for 40 minutes, including:    -AROM EE/EF pronated, pro/sup EF at 90 degrees  x 20  - myofascial cupping X 10 minutes for loosening soft tissue,  improve scar mobility, release  tissue restrictions, decrease muscle tension  and pain, improve blood flow and increase function of  tissues in tricep muscle (omitted today only)   - manual massage to flexors and extensors., distal biceps  - elbow bent to 90 degrees sup and pro ( pain-free range) x 1 minute total  - measured today   -Soft tissue mobs brachioradialis using tool  - LLPS elbow extension and pronated  with moist heat and pain-free    Elbow AROM  EE EF   31 135     Forearm AROM  SUP  PRO   83 87      Strength (Dynamometer) and Pinch Strength (Pinch Gauge)  Measured in pounds.    9/3/2024 8/16/2024         Right  left       Rung  135       Coyle Pinch 22  23       3pt Pinch  22 26            Home Exercises and Education Provided      Education provided:   - Upgraded HEP  - Progress towards goals     Written Home Exercises Provided: yes.  Exercises were reviewed and Nguyễn was able to demonstrate them prior to the end of the session.  Nguyễn demonstrated good  understanding of the HEP provided.   .   See EMR under Patient Instructions for exercises provided 8/13/2024.        Assessment     Pt demonstrated improving RUE funcition. Pt increase in supination,pronation, elbow flexion and extension . Increase strength at this time. He has not been using right elbow for any pulling or pushing. He has help at work for any heavy duty tasks.      Nguyễn is progressing towards his goals and there are no updates to goals at this time. Pt prognosis is Fair.     Pt will continue to benefit from skilled outpatient occupational therapy to address the deficits listed in the problem list on initial evaluation provide pt/family education and to maximize pt's level of function.     Anticipated barriers to therapy: Premorbid anatomical anomolies      Pt's spiritual, cultural and educational needs considered and pt agreeable to plan of care and goals.    Goals:  The following goals were discussed with the patient and patient is in agreement with them as to be addressed in the treatment plan.   Short term Goals:  1) Initiate HEP  2) Pt will increase AROM of R elbow by 5-10 degrees in order to assist with ADLs by 4 weeks.  3) Pt will reduce edema by .5-1 cm in affected elbow by 4 weeks.  4) Pt will reduce pain to less than 4/10 by 4 weeks.  5) Pt will increase  strength by 10-15 pounds for work related task   6) Patient will be able to achieve less than or equal to 50% on the FOTO, demonstrating overall improved functional ability with upper extremity. (Self-care category)   7) Pt will increase pinch strength by 1-3 to twist electrical wires.    Long Term Goals:  Goals to be met by discharge:  1) Independent with HEP  2) Pt will increase R elbow SOLORZANO by 30  degrees in order to increase functional use for grasp with home management or work related tasks by d/c.   3) Pt will decrease edema to trace or none to increase functional ROM by d/c.   4) Pt will decrease pain to trace or none while completing light home management tasks or work related tasks by d/c.   5) Patient will be able to achieve less than or equal to 37% on the FOTO, demonstrating overall improved functional ability with upper extremity.  (Self-care category)       Plan   Cont OT to address above goals.

## 2024-09-17 ENCOUNTER — CLINICAL SUPPORT (OUTPATIENT)
Dept: REHABILITATION | Facility: HOSPITAL | Age: 39
End: 2024-09-17
Payer: COMMERCIAL

## 2024-09-17 DIAGNOSIS — M25.521 PAIN IN RIGHT ELBOW: ICD-10-CM

## 2024-09-17 DIAGNOSIS — M25.621 STIFFNESS OF RIGHT ELBOW JOINT: Primary | ICD-10-CM

## 2024-09-17 PROCEDURE — 97530 THERAPEUTIC ACTIVITIES: CPT | Mod: PO

## 2024-09-17 NOTE — PROGRESS NOTES
Occupational Therapy Daily Treatment Note     Date: 9/17/2024  Name: Nguyễn Gale Jr.  Clinic Number: [id number]    Therapy Diagnosis:   Encounter Diagnoses   Name Primary?    Stiffness of right elbow joint Yes    Pain in right elbow            Physician: Carmen Hercules, *    Physician Orders: Eval and Treat  Medical Diagnosis:   M19.021 (ICD-10-CM) - Arthritis of right elbow      Surgical Procedure and Date: R radial head replacement, 7/19/24   Evaluation Date: 8/5/2024  Insurance Authorization Period Expiration: 12/31/24  Plan of Care Certification Period: 11/1/24  Date of Return to MD:   Visit # / Visits authorized: 7 / 12    FOTO: 1/3     Precautions:  Standard     Time In: 2:30  PM  Time Out: 3:15 PM  Total Appointment Time (timed & untimed codes): 45 minutes      Subjective     Pt reports: Pt able to return to gym  Response to previous treatment:Tiki well    Pain: 1/10  Location: Right elbow    Objective         Nguyễn participated in dynamic functional therapeutic activities to improve functional performance for 40 minutes, including:    -sup/pro using hammer x 1 minutes  - myofascial cupping X 10 minutes for loosening soft tissue,  improve scar mobility, release  tissue restrictions, decrease muscle tension  and pain, improve blood flow and increase function of  tissues in tricep muscle (omitted today only)   - flexion and extension x 30 reps using 5#wt in supination w flexion  - measured today   -Soft tissue mobs brachioradialis using tool  - LLPS elbow extension and pronated  with moist heat and pain-free  myofascial cupping X 5 minutes for loosening soft tissue,  improve scar mobility, release  tissue restrictions, decrease muscle tension  and pain, improve blood flow and increase function of  tissues in extensor muscle   Patient receives ultrasound  for pain control and remold scar tissue to increase tissue elasticity @ 100 duty cycle, 3.3 Mhz, applied to lateral elbow, intensity = 0.6  w/cm2 for 4 minutes.    Elbow AROM  EE EF   29 135     Forearm AROM  SUP  PRO   85 87      Strength (Dynamometer) and Pinch Strength (Pinch Gauge)  Measured in pounds.    9/3/2024 8/16/2024         Right  left       Rung  135       Coyle Pinch 22  23       3pt Pinch  22 26            Home Exercises and Education Provided     Education provided:   - Upgraded HEP  - Progress towards goals     Written Home Exercises Provided: yes.  Exercises were reviewed and Nguyễn was able to demonstrate them prior to the end of the session.  Nguyễn demonstrated good  understanding of the HEP provided.   .   See EMR under Patient Instructions for exercises provided 8/13/2024.        Assessment     Pt demonstrated improving RUE funcition. Pt has been going to the gym and no concerns. He is cautious with arm motions     Nguyễn is progressing towards his goals and there are no updates to goals at this time. Pt prognosis is Good.       Anticipated barriers to therapy: Premorbid anatomical anomolies      Pt's spiritual, cultural and educational needs considered and pt agreeable to plan of care and goals.    Goals:  The following goals were discussed with the patient and patient is in agreement with them as to be addressed in the treatment plan. \\    Short term Goals:  1) Initiate HEP  2) Pt will increase AROM of R elbow by 5-10 degrees in order to assist with ADLs by 4 weeks. (Met)  3) Pt will reduce edema by .5-1 cm in affected elbow by 4 weeks.  4) Pt will reduce pain to less than 4/10 by 4 weeks.(Met)  5) Pt will increase  strength by 10-15 pounds for work related task   6) Patient will be able to achieve less than or equal to 50% on the FOTO, demonstrating overall improved functional ability with upper extremity. (met)   7) Pt will increase pinch strength by 1-3 to twist electrical wires.(Met)    Long Term Goals:  Goals to be met by discharge:  1) Independent with HEP  2) Pt will increase R elbow SOLORZANO by 30 degrees in  order to increase functional use for grasp with home management or work related tasks by d/c.   3) Pt will decrease edema to trace or none to increase functional ROM by d/c.   4) Pt will decrease pain to trace or none while completing light home management tasks or work related tasks by d/c.   5) Patient will be able to achieve less than or equal to 37% on the FOTO, demonstrating overall improved functional ability with upper extremity.  (Self-care category)       Plan   Cont OT to address above goals.    Plan of Care Certification Period: 11/1/24

## 2024-09-20 ENCOUNTER — CLINICAL SUPPORT (OUTPATIENT)
Dept: REHABILITATION | Facility: HOSPITAL | Age: 39
End: 2024-09-20
Payer: COMMERCIAL

## 2024-09-20 DIAGNOSIS — M25.521 PAIN IN RIGHT ELBOW: ICD-10-CM

## 2024-09-20 DIAGNOSIS — M25.621 STIFFNESS OF RIGHT ELBOW JOINT: Primary | ICD-10-CM

## 2024-09-20 PROCEDURE — 97530 THERAPEUTIC ACTIVITIES: CPT | Mod: PO

## 2024-09-20 NOTE — PROGRESS NOTES
Occupational Therapy Daily Treatment Note     Date: 9/20/2024  Name: Nguyễn Gale Jr.  Clinic Number: [id number]    Therapy Diagnosis:   Encounter Diagnoses   Name Primary?    Stiffness of right elbow joint Yes    Pain in right elbow              Physician: Carmen Hercules, *    Physician Orders: Eval and Treat  Medical Diagnosis:   M19.021 (ICD-10-CM) - Arthritis of right elbow      Surgical Procedure and Date: R radial head replacement, 7/19/24   Evaluation Date: 8/5/2024  Insurance Authorization Period Expiration: 12/31/24  Plan of Care Certification Period: 11/1/24  Date of Return to MD:   Visit # / Visits authorized: 8 / 12    FOTO: 2/3     Precautions:  Standard     Time In: 2:30  PM  Time Out: 3:15 PM  Total Appointment Time (timed & untimed codes): 45 minutes      Subjective     Pt reports: Pt has been pain-free going to gymn using 5-10 pounds  Response to previous treatment:Tiki well    Pain: 1/10  Location: Right elbow    Objective         Nguyễn participated in dynamic functional therapeutic activities to improve functional performance for 40 minutes, including:    -sup/pro using hammer x 1 minutes  - myofascial cupping X 10 minutes for loosening soft tissue,  improve scar mobility, release  tissue restrictions, decrease muscle tension  and pain, improve blood flow and increase function of  tissues in tricep muscle   - flexion and extension x 30 reps using 5#wt in supination w flexion  - measured today   -Soft tissue mobs brachioradialis using tool    - LLPS elbow extension and supination  with moist heat and pain-free    -myofascial cupping X 5 minutes for loosening soft tissue,  improve scar mobility, release  tissue restrictions, decrease muscle tension  and pain, improve blood flow and increase function of  tissues in extensor muscle     Elbow AROM  EE EF   33 136     Forearm AROM  SUP  PRO   85 87      Strength (Dynamometer) and Pinch Strength (Pinch Gauge)  Measured in pounds.     9/3/2024 8/16/2024         Right  left       Rung  135       Coyle Pinch 22  23       3pt Pinch  22 26            Home Exercises and Education Provided     Education provided:   - Upgraded HEP  - Progress towards goals     Written Home Exercises Provided: yes.  Exercises were reviewed and Nguyễn was able to demonstrate them prior to the end of the session.  Nguyễn demonstrated good  understanding of the HEP provided.   .   See EMR under Patient Instructions for exercises provided 8/13/2024.        Assessment     Pt demonstrated improving RUE funcition. Pt has been going to the gym and no concerns using 5-10 pounds.    Nguyễn is progressing towards his goals and there are no updates to goals at this time. Pt prognosis is Good.       Anticipated barriers to therapy: Premorbid anatomical anomolies      Pt's spiritual, cultural and educational needs considered and pt agreeable to plan of care and goals.    Goals:  The following goals were discussed with the patient and patient is in agreement with them as to be addressed in the treatment plan. \\    Short term Goals:  1) Initiate HEP  2) Pt will increase AROM of R elbow by 5-10 degrees in order to assist with ADLs by 4 weeks. (Met)  3) Pt will reduce edema by .5-1 cm in affected elbow by 4 weeks.  4) Pt will reduce pain to less than 4/10 by 4 weeks.(Met)  5) Pt will increase  strength by 10-15 pounds for work related task   6) Patient will be able to achieve less than or equal to 50% on the FOTO, demonstrating overall improved functional ability with upper extremity. (met)   7) Pt will increase pinch strength by 1-3 to twist electrical wires.(Met)    Long Term Goals:  Goals to be met by discharge:  1) Independent with HEP  2) Pt will increase R elbow SOLORZANO by 30 degrees in order to increase functional use for grasp with home management or work related tasks by d/c.   3) Pt will decrease edema to trace or none to increase functional ROM by d/c.   4) Pt will  decrease pain to trace or none while completing light home management tasks or work related tasks by d/c.   5) Patient will be able to achieve less than or equal to 37% on the FOTO, demonstrating overall improved functional ability with upper extremity.  (Self-care category)       Plan   Cont OT to address above goals.    Plan of Care Certification Period: 11/1/24

## 2024-09-24 ENCOUNTER — CLINICAL SUPPORT (OUTPATIENT)
Dept: REHABILITATION | Facility: HOSPITAL | Age: 39
End: 2024-09-24
Payer: COMMERCIAL

## 2024-09-24 DIAGNOSIS — M25.521 PAIN IN RIGHT ELBOW: ICD-10-CM

## 2024-09-24 DIAGNOSIS — M25.621 STIFFNESS OF RIGHT ELBOW JOINT: Primary | ICD-10-CM

## 2024-09-24 PROCEDURE — 97530 THERAPEUTIC ACTIVITIES: CPT | Mod: PO

## 2024-09-24 NOTE — PROGRESS NOTES
Occupational Therapy Daily Treatment Note     Date: 9/24/2024  Name: Nguyễn Gale Jr.  Clinic Number: [id number]    Therapy Diagnosis:   Encounter Diagnoses   Name Primary?    Stiffness of right elbow joint Yes    Pain in right elbow          Physician: Carmen Hercules, *    Physician Orders: Eval and Treat  Medical Diagnosis:   M19.021 (ICD-10-CM) - Arthritis of right elbow      Surgical Procedure and Date: R radial head replacement, 7/19/24   Evaluation Date: 8/5/2024  Insurance Authorization Period Expiration: 12/31/24  Plan of Care Certification Period: 11/1/24  Date of Return to MD:   Visit # / Visits authorized: 9 / 12    FOTO: 2/3     Precautions:  Standard     Time In: 2:30  PM  Time Out: 3:20 PM  Total Appointment Time (timed & untimed codes): 50 minutes      Subjective     Pt reports: Pt has been pain-free going to gymn using 5-10 pounds  Response to previous treatment:Tiki well    Pain: 1/10  Location: Right elbow    Objective         Nguyễn participated in dynamic functional therapeutic activities to improve functional performance for 40 minutes, including:    -sup/pro using hammer x 1 minutes    improve scar mobility, release  tissue restrictions, decrease muscle tension  and pain, improve blood flow and increase function of  tissues in tricep muscle   - flexion and extension x 30 reps using 5#wt in supination w flexion  - measured today   - LLPS elbow extension and supination  with moist heat and pain-free    CHT performed  Instrument Assisted Soft Tissue Mobilization  stimulating tissue turnover, scar tissue resorption, and the regeneration of tendons, cross friction massage of 15  muscles and throughout musculature  x 8  minutes          9/24/2024    Elbow AROM  EE EF   33 136     Forearm AROM  SUP  PRO   85 87      Strength (Dynamometer) and Pinch Strength (Pinch Gauge)  Measured in pounds.    9/24/2024 8/16/2024         Right  left       Rung  135       Coyle Pinch 24  23        3pt Pinch  22 26            Home Exercises and Education Provided     Education provided:   - Upgraded HEP  - Progress towards goals     Written Home Exercises Provided: yes.  Exercises were reviewed and Nguyễn was able to demonstrate them prior to the end of the session.  Nguyễn demonstrated good  understanding of the HEP provided.   .   See EMR under Patient Instructions for exercises provided 8/13/2024.        Assessment     Pt demonstrated improving RUE funcition. Pt does not have locking of elbow anymore nor has to sleep with pillow. Measured prior to heat on right elbow.     Nguyễn is progressing towards his goals and there are no updates to goals at this time. Pt prognosis is Good.       Anticipated barriers to therapy: Premorbid anatomical anomolies      Pt's spiritual, cultural and educational needs considered and pt agreeable to plan of care and goals.    Goals:  The following goals were discussed with the patient and patient is in agreement with them as to be addressed in the treatment plan. \\    Short term Goals:  1) Initiate HEP  2) Pt will increase AROM of R elbow by 5-10 degrees in order to assist with ADLs by 4 weeks. (Met)  3) Pt will reduce edema by .5-1 cm in affected elbow by 4 weeks.  4) Pt will reduce pain to less than 4/10 by 4 weeks.(Met)  5) Pt will increase  strength by 10-15 pounds for work related task   6) Patient will be able to achieve less than or equal to 50% on the FOTO, demonstrating overall improved functional ability with upper extremity. (met)   7) Pt will increase pinch strength by 1-3 to twist electrical wires.(Met)    Long Term Goals:  Goals to be met by discharge:  1) Independent with HEP  2) Pt will increase R elbow SOLORZANO by 30 degrees in order to increase functional use for grasp with home management or work related tasks by d/c.   3) Pt will decrease edema to trace or none to increase functional ROM by d/c.   4) Pt will decrease pain to trace or none while completing  light home management tasks or work related tasks by d/c.   5) Patient will be able to achieve less than or equal to 37% on the FOTO, demonstrating overall improved functional ability with upper extremity.  (Self-care category)       Plan   Cont OT to address above goals.    Plan of Care Certification Period: 11/1/24

## 2024-09-24 NOTE — TELEPHONE ENCOUNTER
No care due was identified.  [facility] Embedded Care Due Messages. Reference number: [id number].   9/24/2024 1:54:28 PM CDT

## 2024-09-25 RX ORDER — FLUOXETINE 10 MG/1
10 CAPSULE ORAL DAILY
Qty: 90 CAPSULE | Refills: 0 | Status: SHIPPED | OUTPATIENT
Start: 2024-09-25 | End: 2025-09-25

## 2024-10-10 ENCOUNTER — CLINICAL SUPPORT (OUTPATIENT)
Dept: REHABILITATION | Facility: HOSPITAL | Age: 39
End: 2024-10-10
Payer: COMMERCIAL

## 2024-10-10 DIAGNOSIS — M25.621 STIFFNESS OF RIGHT ELBOW JOINT: Primary | ICD-10-CM

## 2024-10-10 DIAGNOSIS — M25.521 PAIN IN RIGHT ELBOW: ICD-10-CM

## 2024-10-10 PROCEDURE — 97530 THERAPEUTIC ACTIVITIES: CPT

## 2024-10-10 NOTE — PROGRESS NOTES
Occupational Therapy Daily Treatment/discharge  Note     Date: 10/10/2024  Name: Nguyễn Gale Jr.  Clinic Number: [id number]    Therapy Diagnosis:   Encounter Diagnoses   Name Primary?    Stiffness of right elbow joint Yes    Pain in right elbow          Physician: Beatriz Reyes NP    Physician Orders: Eval and Treat  Medical Diagnosis:   M19.021 (ICD-10-CM) - Arthritis of right elbow      Surgical Procedure and Date: R radial head replacement, 7/19/24   Evaluation Date: 8/5/2024  Insurance Authorization Period Expiration: 12/31/24  Plan of Care Certification Period: 11/1/24  Date of Return to MD:   Visit # / Visits authorized: 9 / 12    FOTO: 2/3     Precautions:  Standard     Time In: 2:30  PM  Time Out: 3:20 PM  Total Appointment Time (timed & untimed codes): 50 minutes      Subjective     Pt reports: Pt has been pain-free going to gymn using 5-10 pounds  Response to previous treatment:Tiki well    Pain: 1/10  Location: Right elbow    Objective         Nguyễn participated in dynamic functional therapeutic activities to improve functional performance for 40 minutes, including:    -sup/pro using hammer x 1 minutes    improve scar mobility, release  tissue restrictions, decrease muscle tension  and pain, improve blood flow and increase function of  tissues in tricep muscle   - flexion and extension x 30 reps using 5#wt in supination w flexion  - measured today   - LLPS elbow extension and supination  with moist heat and pain-free    CHT performed  Instrument Assisted Soft Tissue Mobilization  stimulating tissue turnover, scar tissue resorption, and the regeneration of tendons, cross friction massage of 15  muscles and throughout musculature  x 8  minutes          10/10/2024    Elbow AROM  EE EF   31 136     Forearm AROM  SUP  PRO   87 90      Strength (Dynamometer) and Pinch Strength (Pinch Gauge)  Measured in pounds.    9/24/2024 8/16/2024         Right  left       Rung  135       Coyle Pinch  25  23       3pt Pinch  24 26            Home Exercises and Education Provided     Education provided:   - Upgraded HEP  - Progress towards goals     Written Home Exercises Provided: yes.  Exercises were reviewed and Nguyễn was able to demonstrate them prior to the end of the session.  Nguyễn demonstrated good  understanding of the HEP provided.   .   See EMR under Patient Instructions for exercises provided 8/13/2024.        Assessment     All goals met. Full motion.  All goals met. Good motion, less pain.      Anticipated barriers to therapy: Premorbid anatomical anomolies      Pt's spiritual, cultural and educational needs considered and pt agreeable to plan of care and goals.      Short term Goals:  1) Initiate HEP  2) Pt will increase AROM of R elbow by 5-10 degrees in order to assist with ADLs by 4 weeks. (Met)  3) Pt will reduce edema by .5-1 cm in affected elbow by 4 weeks.  4) Pt will reduce pain to less than 4/10 by 4 weeks.(Met)  5) Pt will increase  strength by 10-15 pounds for work related task   6) Patient will be able to achieve less than or equal to 50% on the FOTO, demonstrating overall improved functional ability with upper extremity. (met)   7) Pt will increase pinch strength by 1-3 to twist electrical wires.(Met)    Long Term Goals:  Goals to be met by discharge:  1) Independent with HEP-met  2) Pt will increase R elbow SOLORZANO by 30 degrees in order to increase -metfunctional use for grasp with home management or work related tasks by d/c. met  3) Pt will decrease edema to trace or none to increase functional ROM by d/c. met  4) Pt will decrease pain to trace or none while completing light home management tasks or work related tasks by d/c. met  5) Patient will be able to achieve less than or equal to 37% on the FOTO, demonstrating overall improved functional ability with upper extremity.  (Self-care category)- met       Plan   Pt has been discharged from skilled OT. Has met all goals, great  strength, will continue to work on this at home as he purchased a strengthening kit. Good elbow motion.

## 2024-10-11 ENCOUNTER — OFFICE VISIT (OUTPATIENT)
Dept: ORTHOPEDICS | Facility: CLINIC | Age: 39
End: 2024-10-11
Payer: COMMERCIAL

## 2024-10-11 ENCOUNTER — HOSPITAL ENCOUNTER (OUTPATIENT)
Dept: RADIOLOGY | Facility: OTHER | Age: 39
Discharge: HOME | End: 2024-10-11
Attending: SPECIALIST/TECHNOLOGIST
Payer: COMMERCIAL

## 2024-10-11 DIAGNOSIS — R52 PAIN: ICD-10-CM

## 2024-10-11 DIAGNOSIS — M19.021 ARTHRITIS OF RIGHT ELBOW: ICD-10-CM

## 2024-10-11 DIAGNOSIS — Z98.890 POST-OPERATIVE STATE: Primary | ICD-10-CM

## 2024-10-11 PROCEDURE — 99999 PR PBB SHADOW E&M-EST. PATIENT-LVL III: CPT | Mod: PBBFAC,,, | Performed by: SPECIALIST/TECHNOLOGIST

## 2024-10-11 PROCEDURE — 73080 X-RAY EXAM OF ELBOW: CPT | Mod: 26,RT,, | Performed by: RADIOLOGY

## 2024-10-11 PROCEDURE — 73080 X-RAY EXAM OF ELBOW: CPT | Mod: TC,FY,RT

## 2024-10-11 NOTE — PROGRESS NOTES
10/11/24  Patient is 12 weeks status post right radial head arthroplasty.  He states that he is in no pain.  He is very happy with his outcome.  States that he has ready to get back on the golf [place].  He denies any numbness or tingling.  He states that he has been discharged from therapy.    8/30/24  Patient is 6 weeks status post right radial head arthroplasty.  He states that his pain is minimal.  He does state that when he received his elbow brace last time he was placed in full extension in the locked position and was unable to move.  He states this has very uncomfortable.  He notes that he has continues to progress within therapy as well.  He states that his  strength has improved as well.  He denies any numbness or tingling.    08/02/2024  Mr. Gale is here today for a post-operative visit.  He is 14  days status post  by Dr. Hercules on . He reports that he is well.  Pain is 2/10.  He is  taking pain medication as needed.  He denies fever, chills, and sweats since the time of the surgery.  He denies any numbness or tingling.  He reports he has been immobilized in a sugar-tong splint and has not lifted anything heavy.    Physical exam:    Vitals:    10/11/24 1022   PainSc: 0-No pain     Vital signs are stable, patient is afebrile.  Patient is well dressed and well groomed, no acute distress.  Alert and oriented to person, place, and time.  Post op dressing taken down.  Incision is clean, dry and intact.  There is no erythema or exudate.  There is no sign of any infection. He is NVI.  Patient lacks about 30° of elbow extension.  Elbow flexion about 130°.  Full supination and pronation.  Able to make a full composite fist.    Assessment:   s/p REPLACEMENT, RADIUS, HEAD (Right)   Ligament repair right elbow  Capsular release right elbow     1. Post-operative state        2. Arthritis of right elbow              Plan:  Continue with progression of strengthening   Patient is cleared to return back to  [facility]   Recommend no heavy lifting over 50 lb   Patient we will follow up in clinic p.r.n.

## 2024-11-14 NOTE — TELEPHONE ENCOUNTER
No care due was identified.  [facility] Embedded Care Due Messages. Reference number: [id number].   11/14/2024 1:01:26 PM CST

## 2024-11-15 RX ORDER — FLUOXETINE 10 MG/1
10 CAPSULE ORAL DAILY
Qty: 90 CAPSULE | Refills: 0 | Status: SHIPPED | OUTPATIENT
Start: 2024-11-15 | End: 2025-11-15

## 2025-01-21 ENCOUNTER — OFFICE VISIT (OUTPATIENT)
Dept: PRIMARY CARE CLINIC | Facility: CLINIC | Age: 40
End: 2025-01-21
Payer: COMMERCIAL

## 2025-01-21 DIAGNOSIS — L73.9 FOLLICULITIS: Primary | ICD-10-CM

## 2025-01-21 DIAGNOSIS — F33.1 MODERATE EPISODE OF RECURRENT MAJOR DEPRESSIVE DISORDER: ICD-10-CM

## 2025-01-21 PROCEDURE — 98006 SYNCH AUDIO-VIDEO EST MOD 30: CPT | Mod: 95,,, | Performed by: FAMILY MEDICINE

## 2025-01-21 PROCEDURE — 1160F RVW MEDS BY RX/DR IN RCRD: CPT | Mod: CPTII,95,, | Performed by: FAMILY MEDICINE

## 2025-01-21 PROCEDURE — 1159F MED LIST DOCD IN RCRD: CPT | Mod: CPTII,95,, | Performed by: FAMILY MEDICINE

## 2025-01-21 RX ORDER — SULFAMETHOXAZOLE AND TRIMETHOPRIM 800; 160 MG/1; MG/1
1 TABLET ORAL 2 TIMES DAILY
Qty: 20 TABLET | Refills: 0 | Status: SHIPPED | OUTPATIENT
Start: 2025-01-21

## 2025-01-21 RX ORDER — VALACYCLOVIR HYDROCHLORIDE 1 G/1
TABLET, FILM COATED ORAL
Qty: 12 TABLET | Refills: 11 | Status: SHIPPED | OUTPATIENT
Start: 2025-01-21

## 2025-01-21 RX ORDER — MUPIROCIN 20 MG/G
OINTMENT TOPICAL
Qty: 1 G | Refills: 2 | Status: SHIPPED | OUTPATIENT
Start: 2025-01-21

## 2025-01-21 NOTE — PROGRESS NOTES
There were no vitals taken for this visit.      ===========              Nguyễn Giovanny Gale Jr. is a 39 y.o. male     here for    The patient location is: [place]  The chief complaint leading to consultation is: recurrent cold sores and/or folliculitis in/near nares and other skin lesions c/w folliculitis    Visit type: audiovisual    Face to Face time with patient: 13min  17 min minutes of total time spent on the encounter, which includes face to face time and non-face to face time preparing to see the patient (eg, review of tests), Obtaining and/or reviewing separately obtained history, Documenting clinical information in the electronic or other health record, Independently interpreting results (not separately reported) and communicating results to the patient/family/caregiver, or Care coordination (not separately reported).         Each patient to whom he or she provides medical services by telemedicine is:  (1) informed of the relationship between the physician and patient and the respective role of any other health care provider with respect to management of the patient; and (2) notified that he or she may decline to receive medical services by telemedicine and may withdraw from such care at any time.    Notes:     As in problem above. No physical exam poss bc snow day. Will send mupirocin to bilat nares bid x 5 days, valacyclovir. BactrimDS bid x 10 days    F/u for annual    Patient queried and denies any further complaints      Patient Active Problem List   Diagnosis    Family history of diabetes mellitus (DM)    Bilateral thoracic back pain    Chronic pain of left heel    Otitis media    History of COVID-19    History of cholecystectomy    Gastroesophageal reflux disease with esophagitis    Mixed hyperlipidemia    Transient elevated blood pressure    Moderate episode of recurrent major depressive disorder    Smoker    History of depression    Puncture wound of left hand without foreign body    Decreased  range of motion    Arthritis of right elbow    Stiffness of right elbow joint    Pain in right elbow       SURGICAL AND MEDICAL HISTORY: updated and reviewed.  Past Surgical History:   Procedure Laterality Date    CHOLECYSTECTOMY      REPLACEMENT OF HEAD OF RADIUS Right 7/19/2024    Procedure: REPLACEMENT, RADIUS, HEAD;  Surgeon: Carmen Hercules MD;  Location: [facility];  Service: Orthopedics;  Laterality: Right;     ALLERGIES updated and reviewed.  Review of patient's allergies indicates:   Allergen Reactions    Plantain (jaime)      Butterfly vine       CURRENT OUTPATIENT MEDICATIONS updated and reviewed    Current Outpatient Medications:     ascorbic acid, vitamin C, (VITAMIN C) 250 MG tablet, Take 250 mg by mouth once daily., Disp: , Rfl:     docusate sodium (COLACE) 100 MG capsule, Take 100 mg by mouth 2 (two) times daily., Disp: , Rfl:     FLUoxetine 10 MG capsule, Take 1 capsule (10 mg total) by mouth once daily., Disp: 90 capsule, Rfl: 0    multivitamin capsule, Take 1 capsule by mouth once daily., Disp: , Rfl:     mupirocin (BACTROBAN) 2 % ointment, Apply to bilateral nares bid x 5 days, Disp: 1 g, Rfl: 2    omeprazole (PRILOSEC) 40 MG capsule, Take 1 capsule (40 mg total) by mouth once daily., Disp: 90 capsule, Rfl: 3    psyllium (METAMUCIL) powder, Take 1 packet by mouth 3 (three) times daily., Disp: , Rfl:     sulfamethoxazole-trimethoprim 800-160mg (BACTRIM DS) 800-160 mg Tab, Take 1 tablet by mouth 2 (two) times daily. W food and water, Disp: 20 tablet, Rfl: 0    valACYclovir (VALTREX) 1000 MG tablet, 2 tabs po bid x 1 day, Disp: 12 tablet, Rfl: 11    vitamin D (VITAMIN D3) 1000 units Tab, Take 1,000 Units by mouth once daily., Disp: , Rfl:     Review of Systems   Constitutional:  Negative for activity change and unexpected weight change.   HENT:  Negative for hearing loss, rhinorrhea and trouble swallowing.    Eyes:  Negative for discharge and visual disturbance.   Respiratory:  Negative for chest  tightness and wheezing.    Cardiovascular:  Negative for chest pain and palpitations.   Gastrointestinal:  Negative for blood in stool, constipation, diarrhea and vomiting.   Endocrine: Negative for polydipsia and polyuria.   Genitourinary:  Negative for difficulty urinating, hematuria and urgency.   Musculoskeletal:  Negative for arthralgias, joint swelling and neck pain.   Skin:  Positive for wound.   Neurological:  Negative for weakness and headaches.   Psychiatric/Behavioral:  Negative for confusion and dysphoric mood.        There were no vitals taken for this visit.  Physical Exam  Constitutional:       General: He is not in acute distress.     Appearance: Normal appearance. He is not ill-appearing.   Neurological:      Mental Status: He is alert.   Psychiatric:         Mood and Affect: Mood normal.         Behavior: Behavior normal.     Cannot do physical exam bc it is virtual and a snow storm caused clinic closure today. Urge f/u for inperson if condition doesn't improve    ASSESSMENT/PLAN    Diagnoses and all orders for this visit:    Folliculitis    Moderate episode of recurrent major depressive disorder    Other orders  -     valACYclovir (VALTREX) 1000 MG tablet; 2 tabs po bid x 1 day  -     sulfamethoxazole-trimethoprim 800-160mg (BACTRIM DS) 800-160 mg Tab; Take 1 tablet by mouth 2 (two) times daily. W food and water  -     mupirocin (BACTROBAN) 2 % ointment; Apply to bilateral nares bid x 5 days            Most recent some lab results reviewed with patient.  Any new prescription medications gone over in detail including reason for taking the medication, most common possible side effects and possible costs, etcetera.    Chronic conditions updated. Other than changes or additions as above, cont current medications and maintain follow-up with specialists if indicated.     - Cautioned the patient against excessive use of internet searches for interpreting results before professional review.     Federico BRADY  Alexander WARNER  A dictation device was used to produce this document. Use of such devices sometimes results in grammatical errors or replacement of words that sound similarly.

## 2025-02-19 RX ORDER — OMEPRAZOLE 40 MG/1
40 CAPSULE, DELAYED RELEASE ORAL DAILY
Qty: 90 CAPSULE | Refills: 3 | Status: SHIPPED | OUTPATIENT
Start: 2025-02-19 | End: 2026-02-14

## 2025-02-19 NOTE — TELEPHONE ENCOUNTER
Care Due:                  Date            Visit Type   Department     Provider  --------------------------------------------------------------------------------                                ESTABLISHED                              PATIENT -    [facility]  Last Visit: 01-      [facility]      PETER Munoz  Next Visit: None Scheduled  None         None Found                                                            Last  Test          Frequency    Reason                     Performed    Due Date  --------------------------------------------------------------------------------    CBC.........  12 months..  valACYclovir.............  02- 01-    Cr..........  12 months..  valACYclovir.............  02- 01-    [facility] Embedded Care Due Messages. Reference number: [id number].   2/19/2025 8:33:52 AM CST

## 2025-02-19 NOTE — TELEPHONE ENCOUNTER
Provider Staff:  Action required for this patient    Requires labs      Please see care gap opportunities below in Care Due Message.    Thanks!  [facility]     Appointments      Date Provider   Last Visit   1/21/2025 Federico Munoz MD   Next Visit   Visit date not found Federico Munoz MD     Refill Decision Note   Nguyễn Gale  is requesting a refill authorization.  Brief Assessment and Rationale for Refill:  Approve     Medication Therapy Plan:         Comments:     Note composed:11:00 AM 02/19/2025

## 2025-07-31 ENCOUNTER — OFFICE VISIT (OUTPATIENT)
Dept: PRIMARY CARE CLINIC | Facility: CLINIC | Age: 40
End: 2025-07-31
Payer: COMMERCIAL

## 2025-07-31 DIAGNOSIS — J01.00 ACUTE NON-RECURRENT MAXILLARY SINUSITIS: Primary | ICD-10-CM

## 2025-07-31 RX ORDER — PREDNISONE 20 MG/1
TABLET ORAL
Qty: 10 TABLET | Refills: 0 | Status: SHIPPED | OUTPATIENT
Start: 2025-07-31 | End: 2025-08-07

## 2025-07-31 RX ORDER — AZITHROMYCIN 250 MG/1
TABLET, FILM COATED ORAL
Qty: 6 TABLET | Refills: 0 | Status: SHIPPED | OUTPATIENT
Start: 2025-07-31 | End: 2025-08-05

## 2025-07-31 NOTE — PROGRESS NOTES
Chief Complaint  39-year-old male with chief complaint of nasal congestion, sore throat, fatigue, body aches, low-grade fever.    The patient location is: [place]  The chief complaint leading to consultation is: 39-year-old male with chief complaint of nasal congestion, sore throat, fatigue, body aches, low-grade fever.    Visit type: audiovisual    Face to Face time with patient: 7  10 minutes of total time spent on the encounter, which includes face to face time and non-face to face time preparing to see the patient (eg, review of tests), Obtaining and/or reviewing separately obtained history, Documenting clinical information in the electronic or other health record, Independently interpreting results (not separately reported) and communicating results to the patient/family/caregiver, or Care coordination (not separately reported).     Each patient to whom he or she provides medical services by telemedicine is:  (1) informed of the relationship between the physician and patient and the respective role of any other health care provider with respect to management of the patient; and (2) notified that he or she may decline to receive medical services by telemedicine and may withdraw from such care at any time.    Notes:    JESSICA Gale Jr. is a 39 y.o. male that presents for complains of nasal congestion, fever, body aches, sore throat.  Symptoms started approximately 3 days ago.  Patient reports his  nine  month old baby was recently diagnosed with COVID-19 and he feels he has similar symptoms.  Encouraged patient to rest and drink plenty of fluids.  Will send in Z-Ronal and prednisone taper.  Instructed patient he can take ibuprofen 400 mg every 6 hours as needed for body aches, pain and fever. Instructed patient to follow up if symptoms do not improve or worsen.         PAST MEDICAL HISTORY:  Past Medical History:   Diagnosis Date    Anxiety     GERD (gastroesophageal reflux disease)     Tobacco use         PAST SURGICAL HISTORY:  Past Surgical History:   Procedure Laterality Date    CHOLECYSTECTOMY      REPLACEMENT OF HEAD OF RADIUS Right 7/19/2024    Procedure: REPLACEMENT, RADIUS, HEAD;  Surgeon: Carmen Hercules MD;  Location: [facility];  Service: Orthopedics;  Laterality: Right;       SOCIAL HISTORY:  Social History[1]    FAMILY HISTORY:  Family History   Problem Relation Name Age of Onset    No Known Problems Mother      Peripheral vascular disease Father      Diabetes Father      Heart disease Father      Hypertension Father      Hyperlipidemia Father      Cirrhosis Maternal Grandfather      Liver disease Maternal Grandfather      Heart disease Paternal Grandfather      Diabetes Paternal Grandfather      Hypertension Paternal Grandfather      Hyperlipidemia Paternal Grandfather      Colon cancer Paternal Grandfather      Colon polyps Paternal Grandfather      Glaucoma Maternal Grandmother      Amblyopia Neg Hx      Blindness Neg Hx      Cataracts Neg Hx      Macular degeneration Neg Hx      Retinal detachment Neg Hx      Strabismus Neg Hx      Celiac disease Neg Hx      Crohn's disease Neg Hx      Esophageal cancer Neg Hx      Stomach cancer Neg Hx      Ulcerative colitis Neg Hx      Melanoma Neg Hx         ALLERGIES AND MEDICATIONS: updated and reviewed.  Review of patient's allergies indicates:   Allergen Reactions    Plantain (jaime)      Butterfly vine     Current Medications[2]      ROS  Review of Systems   Constitutional:  Positive for fatigue. Negative for appetite change, fever and unexpected weight change.   HENT:  Positive for congestion, sinus pressure, sinus pain and sore throat. Negative for hearing loss.    Eyes:  Negative for pain and visual disturbance.   Respiratory:  Positive for cough (nonproductive). Negative for shortness of breath and wheezing.    Cardiovascular:  Negative for chest pain, palpitations and leg swelling.   Gastrointestinal:  Negative for abdominal pain, blood in  stool, constipation, diarrhea, nausea and vomiting.   Genitourinary:  Negative for dysuria.   Musculoskeletal:  Negative for arthralgias.   Neurological:  Negative for dizziness and headaches.   Psychiatric/Behavioral:  Negative for suicidal ideas.      PHYSICAL EXAM    Physical Exam  Vitals and nursing note reviewed.   Constitutional:       General: He is not in acute distress.     Appearance: Normal appearance.   HENT:      Head: Normocephalic and atraumatic.   Cardiovascular:      Rate and Rhythm: Normal rate.   Pulmonary:      Effort: Pulmonary effort is normal.      Breath sounds: Normal breath sounds.   Musculoskeletal:         General: Normal range of motion.   Neurological:      General: No focal deficit present.      Mental Status: He is alert and oriented to person, place, and time.      Gait: Gait normal.   Psychiatric:         Mood and Affect: Mood normal.         Thought Content: Thought content normal.       Health Maintenance         Date Due Completion Date    COVID-19 Vaccine (4 - 2024-25 season) 09/01/2024 11/19/2021    Influenza Vaccine (1) 09/01/2025 1/24/2024    Hemoglobin A1c (Diabetic Prevention Screening) 02/03/2027 2/3/2024    Lipid Panel 02/03/2029 2/3/2024    TETANUS VACCINE 01/24/2034 1/24/2024    RSV Vaccine (Age 60+ and Pregnant patients) (1 - 1-dose 75+ series) 11/16/2059 ---          Assessment & Plan    Problem List Items Addressed This Visit    None  Visit Diagnoses         Acute non-recurrent maxillary sinusitis    -  Primary    Relevant Medications    azithromycin (Z-DENIA) 250 MG tablet    predniSONE (DELTASONE) 20 MG tablet            Follow-up: as needed    Cathy Bowman    Medication List with Changes/Refills   New Medications    AZITHROMYCIN (Z-DENIA) 250 MG TABLET    Take 2 tablets by mouth on day 1; Take 1 tablet by mouth on days 2-5    PREDNISONE (DELTASONE) 20 MG TABLET    Take 2 tablets (40 mg total) by mouth once daily for 3 days, THEN 1 tablet (20 mg total) once daily  for 4 days.   Current Medications    ASCORBIC ACID, VITAMIN C, (VITAMIN C) 250 MG TABLET    Take 250 mg by mouth once daily.    DOCUSATE SODIUM (COLACE) 100 MG CAPSULE    Take 100 mg by mouth 2 (two) times daily.    FLUOXETINE 10 MG CAPSULE    Take 1 capsule (10 mg total) by mouth once daily.    MULTIVITAMIN CAPSULE    Take 1 capsule by mouth once daily.    MUPIROCIN (BACTROBAN) 2 % OINTMENT    Apply to bilateral nares bid x 5 days    OMEPRAZOLE (PRILOSEC) 40 MG CAPSULE    TAKE 1 CAPSULE (40 MG TOTAL) BY MOUTH ONCE DAILY.    PSYLLIUM (METAMUCIL) POWDER    Take 1 packet by mouth 3 (three) times daily.    SULFAMETHOXAZOLE-TRIMETHOPRIM 800-160MG (BACTRIM DS) 800-160 MG TAB    Take 1 tablet by mouth 2 (two) times daily. W food and water    VALACYCLOVIR (VALTREX) 1000 MG TABLET    2 tabs po bid x 1 day    VITAMIN D (VITAMIN D3) 1000 UNITS TAB    Take 1,000 Units by mouth once daily.              [1]   Social History  Socioeconomic History    Marital status:    Occupational History    Occupation:     Tobacco Use    Smoking status: Former     Current packs/day: 1.00     Average packs/day: 1 pack/day for 5.0 years (5.0 ttl pk-yrs)     Types: Cigarettes     Passive exposure: Never    Smokeless tobacco: Never    Tobacco comments:     Zyn packs   Substance and Sexual Activity    Alcohol use: Not Currently    Drug use: Yes     Frequency: 7.0 times per week     Types: Marijuana     Comment: smoked yesterday 7/18/24    Sexual activity: Yes     Partners: Female     Social Drivers of Health     Financial Resource Strain: Medium Risk (1/21/2025)    Overall Financial Resource Strain (CARDIA)     Difficulty of Paying Living Expenses: Somewhat hard   Food Insecurity: Food Insecurity Present (1/21/2025)    Hunger Vital Sign     Worried About Running Out of Food in the Last Year: Sometimes true     Ran Out of Food in the Last Year: Sometimes true   Transportation Needs: No Transportation Needs (1/10/2024)    PRAPARE -  Transportation     Lack of Transportation (Medical): No     Lack of Transportation (Non-Medical): No   Physical Activity: Sufficiently Active (1/21/2025)    Exercise Vital Sign     Days of Exercise per Week: 5 days     Minutes of Exercise per Session: 120 min   Stress: No Stress Concern Present (1/21/2025)    [employer] - Occupational Stress Questionnaire     Feeling of Stress : Not at all   Housing Stability: High Risk (1/10/2024)    Housing Stability Vital Sign     Unable to Pay for Housing in the Last Year: Yes     Number of Places Lived in the Last Year: 1     Unstable Housing in the Last Year: No   [2]   Current Outpatient Medications   Medication Sig Dispense Refill    ascorbic acid, vitamin C, (VITAMIN C) 250 MG tablet Take 250 mg by mouth once daily.      azithromycin (Z-DENIA) 250 MG tablet Take 2 tablets by mouth on day 1; Take 1 tablet by mouth on days 2-5 6 tablet 0    docusate sodium (COLACE) 100 MG capsule Take 100 mg by mouth 2 (two) times daily.      FLUoxetine 10 MG capsule Take 1 capsule (10 mg total) by mouth once daily. 90 capsule 0    multivitamin capsule Take 1 capsule by mouth once daily.      mupirocin (BACTROBAN) 2 % ointment Apply to bilateral nares bid x 5 days 1 g 2    omeprazole (PRILOSEC) 40 MG capsule TAKE 1 CAPSULE (40 MG TOTAL) BY MOUTH ONCE DAILY. 90 capsule 3    predniSONE (DELTASONE) 20 MG tablet Take 2 tablets (40 mg total) by mouth once daily for 3 days, THEN 1 tablet (20 mg total) once daily for 4 days. 10 tablet 0    psyllium (METAMUCIL) powder Take 1 packet by mouth 3 (three) times daily.      sulfamethoxazole-trimethoprim 800-160mg (BACTRIM DS) 800-160 mg Tab Take 1 tablet by mouth 2 (two) times daily. W food and water 20 tablet 0    valACYclovir (VALTREX) 1000 MG tablet 2 tabs po bid x 1 day 12 tablet 11    vitamin D (VITAMIN D3) 1000 units Tab Take 1,000 Units by mouth once daily.       No current facility-administered medications for this  visit.

## (undated) DEVICE — BLADE SURG #15 CARBON STEEL

## (undated) DEVICE — HOSE DUAL W/CPC CONNECTORS

## (undated) DEVICE — SUT MONOCRYL 3-0 PS-2 UND

## (undated) DEVICE — SPLINT PLASTER FAST SET 5X30IN

## (undated) DEVICE — SCRUB 10% POVIDONE IODINE 4OZ

## (undated) DEVICE — YANKAUER OPEN TIP W/O VENT

## (undated) DEVICE — SUT FIBERWIRE 0 38 W/NDL

## (undated) DEVICE — DRESSING N ADH OIL EMUL 3X3

## (undated) DEVICE — Device

## (undated) DEVICE — COVER CAMERA OPERATING ROOM

## (undated) DEVICE — SYR 50ML CATH TIP

## (undated) DEVICE — GOWN ECLIPSE REINF LVL4 TWL XL

## (undated) DEVICE — BNDG COFLEX FOAM LF2 ST 4X5YD

## (undated) DEVICE — SUT MONOCRYL 4-0 UD P-3 18

## (undated) DEVICE — TOWEL OR DISP STRL BLUE 4/PK

## (undated) DEVICE — SPONGE COTTON TRAY 4X4IN

## (undated) DEVICE — DRAPE STERI-DRAPE 1000 17X11IN

## (undated) DEVICE — SOL IRR SOD CHL .9% POUR

## (undated) DEVICE — DRAPE C-ARM MINI DISP

## (undated) DEVICE — MIXER BONE CEMENT

## (undated) DEVICE — SLING ARM X-LARGE FOAM STRAP

## (undated) DEVICE — DRAPE STERI INSTRUMENT 1018

## (undated) DEVICE — GLOVE BIOGEL ECLIPSE SZ 7

## (undated) DEVICE — PAD CAST SPECIALIST STRL 4

## (undated) DEVICE — BLADE SAG MIC FINE 9.5X25.5MM

## (undated) DEVICE — BLADE TONGUE DEPRESSOR STRL

## (undated) DEVICE — FORCEP STRAIGHT DISP

## (undated) DEVICE — GLOVE BIOGEL PI MICRO INDIC 7

## (undated) DEVICE — CORD FOR BIPOLAR FORCEPS 12

## (undated) DEVICE — ADHESIVE DERMABOND ADVANCED

## (undated) DEVICE — ELECTRODE REM PLYHSV RETURN 9

## (undated) DEVICE — BANDAGE MATRIX HK LOOP 4IN 5YD

## (undated) DEVICE — SUT 3/0 18IN COATED VICRYLP

## (undated) DEVICE — TOURNIQUET SB QC DP 18X4IN